# Patient Record
Sex: MALE | Race: WHITE | NOT HISPANIC OR LATINO | Employment: UNEMPLOYED | URBAN - METROPOLITAN AREA
[De-identification: names, ages, dates, MRNs, and addresses within clinical notes are randomized per-mention and may not be internally consistent; named-entity substitution may affect disease eponyms.]

---

## 2017-03-24 ENCOUNTER — HISTORICAL (OUTPATIENT)
Dept: CARDIOLOGY | Facility: CLINIC | Age: 57
End: 2017-03-24

## 2017-06-06 ENCOUNTER — HISTORICAL (OUTPATIENT)
Dept: ADMINISTRATIVE | Facility: HOSPITAL | Age: 57
End: 2017-06-06

## 2017-06-06 LAB
INR PPP: 1.81 (ref 0.9–1.2)
PROTHROMBIN TIME: 20.7 SECOND(S) (ref 11.9–14.4)

## 2017-07-25 ENCOUNTER — HISTORICAL (OUTPATIENT)
Dept: ADMINISTRATIVE | Facility: HOSPITAL | Age: 57
End: 2017-07-25

## 2017-07-25 LAB
INR PPP: 2.42 (ref 0.9–1.2)
PROTHROMBIN TIME: 25.9 SECOND(S) (ref 11.9–14.4)

## 2017-09-05 ENCOUNTER — HISTORICAL (OUTPATIENT)
Dept: INTERNAL MEDICINE | Facility: CLINIC | Age: 57
End: 2017-09-05

## 2017-09-05 LAB
INR PPP: 2.73 (ref 0.9–1.2)
PROTHROMBIN TIME: 28.5 SECOND(S) (ref 11.9–14.4)

## 2017-12-20 ENCOUNTER — HISTORICAL (OUTPATIENT)
Dept: INTERNAL MEDICINE | Facility: CLINIC | Age: 57
End: 2017-12-20

## 2017-12-20 LAB
INR PPP: 2.56 (ref 0.9–1.2)
PROTHROMBIN TIME: 27.1 SECOND(S) (ref 11.9–14.4)

## 2018-03-14 ENCOUNTER — HISTORICAL (OUTPATIENT)
Dept: INTERNAL MEDICINE | Facility: CLINIC | Age: 58
End: 2018-03-14

## 2018-03-14 LAB
INR PPP: 2.56 (ref 0.9–1.2)
PROTHROMBIN TIME: 26.2 SECOND(S) (ref 11.9–14.4)

## 2018-04-25 ENCOUNTER — HISTORICAL (OUTPATIENT)
Dept: INTERNAL MEDICINE | Facility: CLINIC | Age: 58
End: 2018-04-25

## 2018-04-25 LAB
INR PPP: 2.29 (ref 0.9–1.2)
PROTHROMBIN TIME: 24 SECOND(S) (ref 11.9–14.4)

## 2018-06-13 ENCOUNTER — HISTORICAL (OUTPATIENT)
Dept: RADIOLOGY | Facility: HOSPITAL | Age: 58
End: 2018-06-13

## 2018-08-20 ENCOUNTER — HISTORICAL (OUTPATIENT)
Dept: RADIOLOGY | Facility: HOSPITAL | Age: 58
End: 2018-08-20

## 2018-08-21 ENCOUNTER — HISTORICAL (OUTPATIENT)
Dept: ADMINISTRATIVE | Facility: HOSPITAL | Age: 58
End: 2018-08-21

## 2018-08-21 LAB
ABS NEUT (OLG): 5.67 X10(3)/MCL (ref 2.1–9.2)
APTT PPP: 41.8 SECOND(S) (ref 23.3–37)
BASOPHILS # BLD AUTO: 0.07 X10(3)/MCL
BASOPHILS NFR BLD AUTO: 1 %
BUN SERPL-MCNC: 12 MG/DL (ref 7–18)
CALCIUM SERPL-MCNC: 8.3 MG/DL (ref 8.5–10.1)
CHLORIDE SERPL-SCNC: 106 MMOL/L (ref 98–107)
CO2 SERPL-SCNC: 26 MMOL/L (ref 21–32)
CREAT SERPL-MCNC: 0.9 MG/DL (ref 0.6–1.3)
CREAT/UREA NIT SERPL: 13
EOSINOPHIL # BLD AUTO: 0.42 X10(3)/MCL
EOSINOPHIL NFR BLD AUTO: 5 %
ERYTHROCYTE [DISTWIDTH] IN BLOOD BY AUTOMATED COUNT: 12.8 % (ref 11.5–14.5)
GLUCOSE SERPL-MCNC: 118 MG/DL (ref 74–106)
HCT VFR BLD AUTO: 43 % (ref 40–51)
HGB BLD-MCNC: 14.3 GM/DL (ref 13.5–17.5)
IMM GRANULOCYTES # BLD AUTO: 0.03 10*3/UL
IMM GRANULOCYTES NFR BLD AUTO: 0 %
INR PPP: 2.76 (ref 0.9–1.2)
LYMPHOCYTES # BLD AUTO: 2.2 X10(3)/MCL
LYMPHOCYTES NFR BLD AUTO: 24 % (ref 13–40)
MCH RBC QN AUTO: 31.3 PG (ref 26–34)
MCHC RBC AUTO-ENTMCNC: 33.3 GM/DL (ref 31–37)
MCV RBC AUTO: 94.1 FL (ref 80–100)
MONOCYTES # BLD AUTO: 0.77 X10(3)/MCL
MONOCYTES NFR BLD AUTO: 8 % (ref 4–12)
NEUTROPHILS # BLD AUTO: 5.67 X10(3)/MCL
NEUTROPHILS NFR BLD AUTO: 62 X10(3)/MCL
PLATELET # BLD AUTO: 255 X10(3)/MCL (ref 130–400)
PMV BLD AUTO: 10.7 FL (ref 7.4–10.4)
POTASSIUM SERPL-SCNC: 3.7 MMOL/L (ref 3.5–5.1)
PROTHROMBIN TIME: 27.8 SECOND(S) (ref 11.9–14.4)
RBC # BLD AUTO: 4.57 X10(6)/MCL (ref 4.5–5.9)
SODIUM SERPL-SCNC: 141 MMOL/L (ref 136–145)
WBC # SPEC AUTO: 9.2 X10(3)/MCL (ref 4.5–11)

## 2018-08-29 ENCOUNTER — HISTORICAL (OUTPATIENT)
Dept: CARDIOLOGY | Facility: HOSPITAL | Age: 58
End: 2018-08-29

## 2018-08-29 ENCOUNTER — HISTORICAL (OUTPATIENT)
Dept: RADIOLOGY | Facility: HOSPITAL | Age: 58
End: 2018-08-29

## 2018-08-29 LAB
APTT PPP: 29 SECOND(S) (ref 23.3–37)
INR PPP: 1.09 (ref 0.9–1.2)
PROTHROMBIN TIME: 13.4 SECOND(S) (ref 11.9–14.4)

## 2018-09-12 ENCOUNTER — HISTORICAL (OUTPATIENT)
Dept: CARDIOLOGY | Facility: HOSPITAL | Age: 58
End: 2018-09-12

## 2018-09-12 LAB
APTT PPP: 30.7 SECOND(S) (ref 23.3–37)
INR PPP: 1.18 (ref 0.9–1.2)
PROTHROMBIN TIME: 14.2 SECOND(S) (ref 11.9–14.4)

## 2019-01-22 ENCOUNTER — HISTORICAL (OUTPATIENT)
Dept: ADMINISTRATIVE | Facility: HOSPITAL | Age: 59
End: 2019-01-22

## 2019-01-22 LAB
BUN SERPL-MCNC: 10 MG/DL (ref 7–18)
CALCIUM SERPL-MCNC: 8.7 MG/DL (ref 8.5–10.1)
CHLORIDE SERPL-SCNC: 107 MMOL/L (ref 98–107)
CO2 SERPL-SCNC: 29 MMOL/L (ref 21–32)
CREAT SERPL-MCNC: 0.9 MG/DL (ref 0.6–1.3)
CREAT/UREA NIT SERPL: 11
GLUCOSE SERPL-MCNC: 87 MG/DL (ref 74–106)
MAGNESIUM SERPL-MCNC: 2.1 MG/DL (ref 1.8–2.4)
POTASSIUM SERPL-SCNC: 4 MMOL/L (ref 3.5–5.1)
SODIUM SERPL-SCNC: 140 MMOL/L (ref 136–145)

## 2020-09-10 ENCOUNTER — HISTORICAL (OUTPATIENT)
Dept: CARDIOLOGY | Facility: CLINIC | Age: 60
End: 2020-09-10

## 2020-09-10 LAB
ALBUMIN SERPL-MCNC: 3.7 GM/DL (ref 3.4–5)
ALBUMIN/GLOB SERPL: 1.2 RATIO (ref 1.1–2)
ALP SERPL-CCNC: 72 UNIT/L (ref 45–117)
ALT SERPL-CCNC: 20 UNIT/L (ref 12–78)
AST SERPL-CCNC: 19 UNIT/L (ref 15–37)
BILIRUB SERPL-MCNC: 0.5 MG/DL (ref 0.2–1)
BILIRUBIN DIRECT+TOT PNL SERPL-MCNC: 0.1 MG/DL (ref 0–0.2)
BILIRUBIN DIRECT+TOT PNL SERPL-MCNC: 0.4 MG/DL
BUN SERPL-MCNC: 21 MG/DL (ref 7–18)
CALCIUM SERPL-MCNC: 8.5 MG/DL (ref 8.5–10.1)
CHLORIDE SERPL-SCNC: 109 MMOL/L (ref 98–107)
CHOLEST SERPL-MCNC: 152 MG/DL
CHOLEST/HDLC SERPL: 4 {RATIO} (ref 0–5)
CO2 SERPL-SCNC: 26 MMOL/L (ref 21–32)
CREAT SERPL-MCNC: 1.2 MG/DL (ref 0.6–1.3)
GLOBULIN SER-MCNC: 3 GM/ML (ref 2.3–3.5)
GLUCOSE SERPL-MCNC: 97 MG/DL (ref 74–106)
HDLC SERPL-MCNC: 38 MG/DL (ref 40–59)
LDLC SERPL CALC-MCNC: 90 MG/DL
POTASSIUM SERPL-SCNC: 4.4 MMOL/L (ref 3.5–5.1)
PROT SERPL-MCNC: 6.7 GM/DL (ref 6.4–8.2)
SODIUM SERPL-SCNC: 142 MMOL/L (ref 136–145)
T4 FREE SERPL-MCNC: 1.35 NG/DL (ref 0.76–1.46)
TRIGL SERPL-MCNC: 122 MG/DL
TSH SERPL-ACNC: 3.86 MIU/L (ref 0.36–3.74)
VLDLC SERPL CALC-MCNC: 24 MG/DL

## 2021-02-04 ENCOUNTER — HISTORICAL (OUTPATIENT)
Dept: CARDIOLOGY | Facility: HOSPITAL | Age: 61
End: 2021-02-04

## 2021-02-04 LAB
ABS NEUT (OLG): 3.88 X10(3)/MCL (ref 2.1–9.2)
APTT PPP: 34.8 SECOND(S) (ref 23.3–37)
BASOPHILS # BLD AUTO: 0.1 X10(3)/MCL (ref 0–0.2)
BASOPHILS NFR BLD AUTO: 1 %
BUN SERPL-MCNC: 23 MG/DL (ref 8.4–25.7)
CALCIUM SERPL-MCNC: 8.9 MG/DL (ref 8.4–10.2)
CHLORIDE SERPL-SCNC: 106 MMOL/L (ref 98–107)
CO2 SERPL-SCNC: 24 MMOL/L (ref 22–29)
CREAT SERPL-MCNC: 1.42 MG/DL (ref 0.73–1.18)
CREAT/UREA NIT SERPL: 16
EOSINOPHIL # BLD AUTO: 0.3 X10(3)/MCL (ref 0–0.9)
EOSINOPHIL NFR BLD AUTO: 4 %
ERYTHROCYTE [DISTWIDTH] IN BLOOD BY AUTOMATED COUNT: 16.5 % (ref 11.5–14.5)
GLUCOSE SERPL-MCNC: 103 MG/DL (ref 74–100)
HCT VFR BLD AUTO: 37.9 % (ref 40–51)
HGB BLD-MCNC: 11.3 GM/DL (ref 13.5–17.5)
IMM GRANULOCYTES # BLD AUTO: 0.03 10*3/UL
IMM GRANULOCYTES NFR BLD AUTO: 0 %
INR PPP: 1.97 (ref 0.9–1.2)
LYMPHOCYTES # BLD AUTO: 0.9 X10(3)/MCL (ref 0.6–4.6)
LYMPHOCYTES NFR BLD AUTO: 15 %
MCH RBC QN AUTO: 26 PG (ref 26–34)
MCHC RBC AUTO-ENTMCNC: 29.8 GM/DL (ref 31–37)
MCV RBC AUTO: 87.3 FL (ref 80–100)
MONOCYTES # BLD AUTO: 0.8 X10(3)/MCL (ref 0.1–1.3)
MONOCYTES NFR BLD AUTO: 13 %
NEUTROPHILS # BLD AUTO: 3.88 X10(3)/MCL (ref 2.1–9.2)
NEUTROPHILS NFR BLD AUTO: 66 %
PLATELET # BLD AUTO: 276 X10(3)/MCL (ref 130–400)
PMV BLD AUTO: 10.2 FL (ref 7.4–10.4)
POTASSIUM SERPL-SCNC: 4.3 MMOL/L (ref 3.5–5.1)
PROTHROMBIN TIME: 21.9 SECOND(S) (ref 11.9–14.4)
RBC # BLD AUTO: 4.34 X10(6)/MCL (ref 4.5–5.9)
SODIUM SERPL-SCNC: 138 MMOL/L (ref 136–145)
WBC # SPEC AUTO: 5.9 X10(3)/MCL (ref 4.5–11)

## 2021-02-08 ENCOUNTER — HISTORICAL (OUTPATIENT)
Dept: CARDIOLOGY | Facility: HOSPITAL | Age: 61
End: 2021-02-08

## 2021-02-08 LAB
APTT PPP: 31 SECOND(S) (ref 23.3–37)
INR PPP: 1.57 (ref 0.9–1.2)
PROTHROMBIN TIME: 18.3 SECOND(S) (ref 11.9–14.4)

## 2021-04-26 ENCOUNTER — HOSPITAL ENCOUNTER (OUTPATIENT)
Dept: MEDSURG UNIT | Facility: HOSPITAL | Age: 61
End: 2021-04-28
Attending: INTERNAL MEDICINE | Admitting: INTERNAL MEDICINE

## 2021-04-26 LAB
ABS NEUT (OLG): 5.49 X10(3)/MCL (ref 2.1–9.2)
ALBUMIN SERPL-MCNC: 3.8 GM/DL (ref 3.5–5)
ALBUMIN/GLOB SERPL: 1.5 RATIO (ref 1.1–2)
ALP SERPL-CCNC: 110 UNIT/L (ref 40–150)
ALT SERPL-CCNC: 46 UNIT/L (ref 0–55)
APTT PPP: 33.2 SECOND(S) (ref 23.3–37)
AST SERPL-CCNC: 29 UNIT/L (ref 5–34)
BASOPHILS # BLD AUTO: 0.1 X10(3)/MCL (ref 0–0.2)
BASOPHILS NFR BLD AUTO: 1 %
BILIRUB SERPL-MCNC: 1.2 MG/DL
BILIRUBIN DIRECT+TOT PNL SERPL-MCNC: 0.6 MG/DL (ref 0–0.5)
BILIRUBIN DIRECT+TOT PNL SERPL-MCNC: 0.6 MG/DL (ref 0–0.8)
BNP BLD-MCNC: 4914.3 PG/ML
BUN SERPL-MCNC: 28.9 MG/DL (ref 8.4–25.7)
CALCIUM SERPL-MCNC: 9.1 MG/DL (ref 8.4–10.2)
CHLORIDE SERPL-SCNC: 100 MMOL/L (ref 98–107)
CO2 SERPL-SCNC: 26 MMOL/L (ref 22–29)
CREAT SERPL-MCNC: 1.76 MG/DL (ref 0.73–1.18)
EOSINOPHIL # BLD AUTO: 0.2 X10(3)/MCL (ref 0–0.9)
EOSINOPHIL NFR BLD AUTO: 2 %
ERYTHROCYTE [DISTWIDTH] IN BLOOD BY AUTOMATED COUNT: 17.2 % (ref 11.5–14.5)
GLOBULIN SER-MCNC: 2.6 GM/DL (ref 2.4–3.5)
GLUCOSE SERPL-MCNC: 123 MG/DL (ref 74–100)
HCT VFR BLD AUTO: 37.8 % (ref 40–51)
HGB BLD-MCNC: 11.3 GM/DL (ref 13.5–17.5)
IMM GRANULOCYTES # BLD AUTO: 0.02 10*3/UL
IMM GRANULOCYTES NFR BLD AUTO: 0 %
INR PPP: 2.04 (ref 0.9–1.2)
LYMPHOCYTES # BLD AUTO: 0.9 X10(3)/MCL (ref 0.6–4.6)
LYMPHOCYTES NFR BLD AUTO: 12 %
MCH RBC QN AUTO: 25.3 PG (ref 26–34)
MCHC RBC AUTO-ENTMCNC: 29.9 GM/DL (ref 31–37)
MCV RBC AUTO: 84.6 FL (ref 80–100)
MONOCYTES # BLD AUTO: 0.7 X10(3)/MCL (ref 0.1–1.3)
MONOCYTES NFR BLD AUTO: 10 %
NEUTROPHILS # BLD AUTO: 5.49 X10(3)/MCL (ref 2.1–9.2)
NEUTROPHILS NFR BLD AUTO: 75 %
PLATELET # BLD AUTO: 258 X10(3)/MCL (ref 130–400)
PMV BLD AUTO: 10.4 FL (ref 7.4–10.4)
POTASSIUM SERPL-SCNC: 5.3 MMOL/L (ref 3.5–5.1)
PROT SERPL-MCNC: 6.4 GM/DL (ref 6.4–8.3)
PROTHROMBIN TIME: 22.6 SECOND(S) (ref 11.9–14.4)
RBC # BLD AUTO: 4.47 X10(6)/MCL (ref 4.5–5.9)
SARS-COV-2 AG RESP QL IA.RAPID: NEGATIVE
SODIUM SERPL-SCNC: 133 MMOL/L (ref 136–145)
TROPONIN I SERPL-MCNC: 0.01 NG/ML (ref 0–0.04)
WBC # SPEC AUTO: 7.4 X10(3)/MCL (ref 4.5–11)

## 2021-04-27 LAB
ABS NEUT (OLG): 5.7 X10(3)/MCL (ref 2.1–9.2)
ALBUMIN SERPL-MCNC: 3.5 GM/DL (ref 3.5–5)
ALBUMIN/GLOB SERPL: 1.5 RATIO (ref 1.1–2)
ALP SERPL-CCNC: 90 UNIT/L (ref 40–150)
ALT SERPL-CCNC: 60 UNIT/L (ref 0–55)
AST SERPL-CCNC: 45 UNIT/L (ref 5–34)
BASOPHILS # BLD AUTO: 0.1 X10(3)/MCL (ref 0–0.2)
BASOPHILS NFR BLD AUTO: 1 %
BILIRUB SERPL-MCNC: 1.7 MG/DL
BILIRUBIN DIRECT+TOT PNL SERPL-MCNC: 0.8 MG/DL (ref 0–0.5)
BILIRUBIN DIRECT+TOT PNL SERPL-MCNC: 0.9 MG/DL (ref 0–0.8)
BUN SERPL-MCNC: 32.4 MG/DL (ref 8.4–25.7)
CALCIUM SERPL-MCNC: 9 MG/DL (ref 8.4–10.2)
CHLORIDE SERPL-SCNC: 99 MMOL/L (ref 98–107)
CO2 SERPL-SCNC: 25 MMOL/L (ref 22–29)
CREAT SERPL-MCNC: 1.71 MG/DL (ref 0.73–1.18)
EOSINOPHIL # BLD AUTO: 0.1 X10(3)/MCL (ref 0–0.9)
EOSINOPHIL NFR BLD AUTO: 2 %
ERYTHROCYTE [DISTWIDTH] IN BLOOD BY AUTOMATED COUNT: 16.9 % (ref 11.5–14.5)
GLOBULIN SER-MCNC: 2.4 GM/DL (ref 2.4–3.5)
GLUCOSE SERPL-MCNC: 108 MG/DL (ref 74–100)
HCT VFR BLD AUTO: 34 % (ref 40–51)
HGB BLD-MCNC: 10.3 GM/DL (ref 13.5–17.5)
IMM GRANULOCYTES # BLD AUTO: 0.03 10*3/UL
IMM GRANULOCYTES NFR BLD AUTO: 0 %
INR PPP: 1.96 (ref 0.9–1.2)
LYMPHOCYTES # BLD AUTO: 0.8 X10(3)/MCL (ref 0.6–4.6)
LYMPHOCYTES NFR BLD AUTO: 11 %
MAGNESIUM SERPL-MCNC: 2 MG/DL (ref 1.6–2.6)
MCH RBC QN AUTO: 25.1 PG (ref 26–34)
MCHC RBC AUTO-ENTMCNC: 30.3 GM/DL (ref 31–37)
MCV RBC AUTO: 82.9 FL (ref 80–100)
MONOCYTES # BLD AUTO: 0.7 X10(3)/MCL (ref 0.1–1.3)
MONOCYTES NFR BLD AUTO: 10 %
NEUTROPHILS # BLD AUTO: 5.7 X10(3)/MCL (ref 2.1–9.2)
NEUTROPHILS NFR BLD AUTO: 77 %
PHOSPHATE SERPL-MCNC: 4 MG/DL (ref 2.3–4.7)
PLATELET # BLD AUTO: 235 X10(3)/MCL (ref 130–400)
PMV BLD AUTO: 10.9 FL (ref 7.4–10.4)
POTASSIUM SERPL-SCNC: 4.9 MMOL/L (ref 3.5–5.1)
PROT SERPL-MCNC: 5.9 GM/DL (ref 6.4–8.3)
PROTHROMBIN TIME: 21.8 SECOND(S) (ref 11.9–14.4)
RBC # BLD AUTO: 4.1 X10(6)/MCL (ref 4.5–5.9)
SODIUM SERPL-SCNC: 133 MMOL/L (ref 136–145)
WBC # SPEC AUTO: 7.4 X10(3)/MCL (ref 4.5–11)

## 2021-04-28 LAB
ABS NEUT (OLG): 5.68 X10(3)/MCL (ref 2.1–9.2)
ALBUMIN SERPL-MCNC: 3.7 GM/DL (ref 3.5–5)
ALBUMIN/GLOB SERPL: 1.4 RATIO (ref 1.1–2)
ALP SERPL-CCNC: 94 UNIT/L (ref 40–150)
ALT SERPL-CCNC: 91 UNIT/L (ref 0–55)
AST SERPL-CCNC: 72 UNIT/L (ref 5–34)
BASOPHILS # BLD AUTO: 0 X10(3)/MCL (ref 0–0.2)
BASOPHILS NFR BLD AUTO: 0 %
BILIRUB SERPL-MCNC: 1.7 MG/DL
BILIRUBIN DIRECT+TOT PNL SERPL-MCNC: 0.8 MG/DL (ref 0–0.5)
BILIRUBIN DIRECT+TOT PNL SERPL-MCNC: 0.9 MG/DL (ref 0–0.8)
BNP BLD-MCNC: 6473.5 PG/ML
BUN SERPL-MCNC: 38 MG/DL (ref 8.4–25.7)
CALCIUM SERPL-MCNC: 9.1 MG/DL (ref 8.4–10.2)
CHLORIDE SERPL-SCNC: 96 MMOL/L (ref 98–107)
CO2 SERPL-SCNC: 23 MMOL/L (ref 22–29)
CREAT SERPL-MCNC: 1.79 MG/DL (ref 0.73–1.18)
EOSINOPHIL # BLD AUTO: 0.1 X10(3)/MCL (ref 0–0.9)
EOSINOPHIL NFR BLD AUTO: 1 %
ERYTHROCYTE [DISTWIDTH] IN BLOOD BY AUTOMATED COUNT: 16.9 % (ref 11.5–14.5)
GLOBULIN SER-MCNC: 2.6 GM/DL (ref 2.4–3.5)
GLUCOSE SERPL-MCNC: 104 MG/DL (ref 74–100)
HCT VFR BLD AUTO: 35.3 % (ref 40–51)
HGB BLD-MCNC: 10.8 GM/DL (ref 13.5–17.5)
IMM GRANULOCYTES # BLD AUTO: 0.03 10*3/UL
IMM GRANULOCYTES NFR BLD AUTO: 0 %
INR PPP: 2.01 (ref 0.9–1.2)
LYMPHOCYTES # BLD AUTO: 0.7 X10(3)/MCL (ref 0.6–4.6)
LYMPHOCYTES NFR BLD AUTO: 10 %
MAGNESIUM SERPL-MCNC: 2 MG/DL (ref 1.6–2.6)
MCH RBC QN AUTO: 25.2 PG (ref 26–34)
MCHC RBC AUTO-ENTMCNC: 30.6 GM/DL (ref 31–37)
MCV RBC AUTO: 82.3 FL (ref 80–100)
MONOCYTES # BLD AUTO: 0.9 X10(3)/MCL (ref 0.1–1.3)
MONOCYTES NFR BLD AUTO: 12 %
NEUTROPHILS # BLD AUTO: 5.68 X10(3)/MCL (ref 2.1–9.2)
NEUTROPHILS NFR BLD AUTO: 76 %
PHOSPHATE SERPL-MCNC: 4.3 MG/DL (ref 2.3–4.7)
PLATELET # BLD AUTO: 261 X10(3)/MCL (ref 130–400)
PMV BLD AUTO: 10.8 FL (ref 7.4–10.4)
POTASSIUM SERPL-SCNC: 5 MMOL/L (ref 3.5–5.1)
PROT SERPL-MCNC: 6.3 GM/DL (ref 6.4–8.3)
PROTHROMBIN TIME: 22.3 SECOND(S) (ref 11.9–14.4)
RBC # BLD AUTO: 4.29 X10(6)/MCL (ref 4.5–5.9)
SODIUM SERPL-SCNC: 129 MMOL/L (ref 136–145)
WBC # SPEC AUTO: 7.5 X10(3)/MCL (ref 4.5–11)

## 2021-05-17 ENCOUNTER — HISTORICAL (OUTPATIENT)
Dept: ADMINISTRATIVE | Facility: HOSPITAL | Age: 61
End: 2021-05-17

## 2021-05-17 LAB
ABS NEUT (OLG): 5.72 X10(3)/MCL (ref 2.1–9.2)
ALBUMIN SERPL-MCNC: 3.7 GM/DL (ref 3.5–5)
ALBUMIN/GLOB SERPL: 1.1 RATIO (ref 1.1–2)
ALP SERPL-CCNC: 162 UNIT/L (ref 40–150)
ALT SERPL-CCNC: 210 UNIT/L (ref 0–55)
APPEARANCE, UA: CLEAR
AST SERPL-CCNC: 41 UNIT/L (ref 5–34)
BACTERIA #/AREA URNS AUTO: ABNORMAL /HPF
BASOPHILS # BLD AUTO: 0.1 X10(3)/MCL (ref 0–0.2)
BASOPHILS NFR BLD AUTO: 1 %
BILIRUB SERPL-MCNC: 1.9 MG/DL
BILIRUB UR QL STRIP: NEGATIVE
BILIRUBIN DIRECT+TOT PNL SERPL-MCNC: 0.7 MG/DL (ref 0–0.8)
BILIRUBIN DIRECT+TOT PNL SERPL-MCNC: 1.2 MG/DL (ref 0–0.5)
BUN SERPL-MCNC: 14.3 MG/DL (ref 8.4–25.7)
CALCIUM SERPL-MCNC: 9.5 MG/DL (ref 8.4–10.2)
CHLORIDE SERPL-SCNC: 92 MMOL/L (ref 98–107)
CHOLEST SERPL-MCNC: 136 MG/DL
CHOLEST/HDLC SERPL: 3 {RATIO} (ref 0–5)
CO2 SERPL-SCNC: 31 MMOL/L (ref 22–29)
COLOR UR: ABNORMAL
CREAT SERPL-MCNC: 1.24 MG/DL (ref 0.73–1.18)
EOSINOPHIL # BLD AUTO: 0.2 X10(3)/MCL (ref 0–0.9)
EOSINOPHIL NFR BLD AUTO: 2 %
ERYTHROCYTE [DISTWIDTH] IN BLOOD BY AUTOMATED COUNT: 19.9 % (ref 11.5–14.5)
EST. AVERAGE GLUCOSE BLD GHB EST-MCNC: 125.5 MG/DL
GLOBULIN SER-MCNC: 3.4 GM/DL (ref 2.4–3.5)
GLUCOSE (UA): NEGATIVE
GLUCOSE SERPL-MCNC: 93 MG/DL (ref 74–100)
HBA1C MFR BLD: 6 %
HCT VFR BLD AUTO: 41.4 % (ref 40–51)
HDLC SERPL-MCNC: 46 MG/DL (ref 35–60)
HGB BLD-MCNC: 12.3 GM/DL (ref 13.5–17.5)
HGB UR QL STRIP: NEGATIVE
HYALINE CASTS #/AREA URNS LPF: ABNORMAL /LPF
IMM GRANULOCYTES # BLD AUTO: 0.19 10*3/UL
IMM GRANULOCYTES NFR BLD AUTO: 2 %
KETONES UR QL STRIP: NEGATIVE
LDLC SERPL CALC-MCNC: 72 MG/DL (ref 50–140)
LEUKOCYTE ESTERASE UR QL STRIP: NEGATIVE
LYMPHOCYTES # BLD AUTO: 0.6 X10(3)/MCL (ref 0.6–4.6)
LYMPHOCYTES NFR BLD AUTO: 8 %
MCH RBC QN AUTO: 25.1 PG (ref 26–34)
MCHC RBC AUTO-ENTMCNC: 29.7 GM/DL (ref 31–37)
MCV RBC AUTO: 84.3 FL (ref 80–100)
MONOCYTES # BLD AUTO: 1.1 X10(3)/MCL (ref 0.1–1.3)
MONOCYTES NFR BLD AUTO: 14 %
NEUTROPHILS # BLD AUTO: 5.72 X10(3)/MCL (ref 2.1–9.2)
NEUTROPHILS NFR BLD AUTO: 73 %
NITRITE UR QL STRIP: NEGATIVE
PH UR STRIP: 7.5 [PH] (ref 4.5–8)
PLATELET # BLD AUTO: 548 X10(3)/MCL (ref 130–400)
PMV BLD AUTO: 9.6 FL (ref 7.4–10.4)
POTASSIUM SERPL-SCNC: 4.3 MMOL/L (ref 3.5–5.1)
PROT SERPL-MCNC: 7.1 GM/DL (ref 6.4–8.3)
PROT UR QL STRIP: NEGATIVE
PSA SERPL-MCNC: 5.88 NG/ML
RBC # BLD AUTO: 4.91 X10(6)/MCL (ref 4.5–5.9)
RBC #/AREA URNS AUTO: ABNORMAL /HPF
SODIUM SERPL-SCNC: 132 MMOL/L (ref 136–145)
SP GR UR STRIP: 1 (ref 1–1.03)
SQUAMOUS #/AREA URNS LPF: ABNORMAL /LPF
T4 FREE SERPL-MCNC: 1.09 NG/DL (ref 0.7–1.48)
TRIGL SERPL-MCNC: 89 MG/DL (ref 34–140)
TSH SERPL-ACNC: 8.57 UIU/ML (ref 0.35–4.94)
UROBILINOGEN UR STRIP-ACNC: NORMAL
VLDLC SERPL CALC-MCNC: 18 MG/DL
WBC # SPEC AUTO: 7.9 X10(3)/MCL (ref 4.5–11)
WBC #/AREA URNS AUTO: ABNORMAL /HPF

## 2021-11-06 ENCOUNTER — HOSPITAL ENCOUNTER (OUTPATIENT)
Dept: MEDSURG UNIT | Facility: HOSPITAL | Age: 61
End: 2021-11-09
Attending: STUDENT IN AN ORGANIZED HEALTH CARE EDUCATION/TRAINING PROGRAM | Admitting: FAMILY MEDICINE

## 2021-11-06 LAB
ABS NEUT (OLG): 6.52 X10(3)/MCL (ref 2.1–9.2)
ALBUMIN SERPL-MCNC: 3.9 GM/DL (ref 3.4–4.8)
ALBUMIN/GLOB SERPL: 1.1 RATIO (ref 1.1–2)
ALP SERPL-CCNC: 97 UNIT/L (ref 40–150)
ALT SERPL-CCNC: 10 UNIT/L (ref 0–55)
APTT PPP: 43.3 SECOND(S) (ref 23.3–37)
AST SERPL-CCNC: 17 UNIT/L (ref 5–34)
BASOPHILS # BLD AUTO: 0.1 X10(3)/MCL (ref 0–0.2)
BASOPHILS NFR BLD AUTO: 1 %
BILIRUB SERPL-MCNC: 0.6 MG/DL
BILIRUBIN DIRECT+TOT PNL SERPL-MCNC: 0.3 MG/DL (ref 0–0.5)
BILIRUBIN DIRECT+TOT PNL SERPL-MCNC: 0.3 MG/DL (ref 0–0.8)
BUN SERPL-MCNC: 10.9 MG/DL (ref 8.4–25.7)
BUN SERPL-MCNC: 11 MG/DL (ref 8.4–25.7)
CALCIUM SERPL-MCNC: 10 MG/DL (ref 8.7–10.5)
CALCIUM SERPL-MCNC: 9.8 MG/DL (ref 8.7–10.5)
CHLORIDE SERPL-SCNC: 100 MMOL/L (ref 98–107)
CHLORIDE SERPL-SCNC: 100 MMOL/L (ref 98–107)
CO2 SERPL-SCNC: 25 MMOL/L (ref 23–31)
CO2 SERPL-SCNC: 27 MMOL/L (ref 23–31)
CREAT SERPL-MCNC: 0.98 MG/DL (ref 0.73–1.18)
CREAT SERPL-MCNC: 1.03 MG/DL (ref 0.73–1.18)
CREAT/UREA NIT SERPL: 11
EOSINOPHIL # BLD AUTO: 0.2 X10(3)/MCL (ref 0–0.9)
EOSINOPHIL NFR BLD AUTO: 3 %
ERYTHROCYTE [DISTWIDTH] IN BLOOD BY AUTOMATED COUNT: 14.1 % (ref 11.5–14.5)
EST. AVERAGE GLUCOSE BLD GHB EST-MCNC: 105.4 MG/DL
GLOBULIN SER-MCNC: 3.4 GM/DL (ref 2.4–3.5)
GLUCOSE SERPL-MCNC: 107 MG/DL (ref 82–115)
GLUCOSE SERPL-MCNC: 110 MG/DL (ref 82–115)
HBA1C MFR BLD: 5.3 %
HCT VFR BLD AUTO: 43.3 % (ref 40–51)
HGB BLD-MCNC: 14.2 GM/DL (ref 13.5–17.5)
IMM GRANULOCYTES # BLD AUTO: 0.03 10*3/UL
IMM GRANULOCYTES NFR BLD AUTO: 0 %
INR PPP: 2.49 (ref 0.9–1.2)
LYMPHOCYTES # BLD AUTO: 0.8 X10(3)/MCL (ref 0.6–4.6)
LYMPHOCYTES NFR BLD AUTO: 10 %
MAGNESIUM SERPL-MCNC: 1.7 MG/DL (ref 1.6–2.6)
MCH RBC QN AUTO: 28.5 PG (ref 26–34)
MCHC RBC AUTO-ENTMCNC: 32.8 GM/DL (ref 31–37)
MCV RBC AUTO: 86.8 FL (ref 80–100)
MONOCYTES # BLD AUTO: 0.8 X10(3)/MCL (ref 0.1–1.3)
MONOCYTES NFR BLD AUTO: 10 %
NEUTROPHILS # BLD AUTO: 6.52 X10(3)/MCL (ref 2.1–9.2)
NEUTROPHILS NFR BLD AUTO: 76 %
NRBC BLD AUTO-RTO: 0 % (ref 0–0.2)
PHOSPHATE SERPL-MCNC: 2.9 MG/DL (ref 2.3–4.7)
PLATELET # BLD AUTO: 330 X10(3)/MCL (ref 130–400)
PMV BLD AUTO: 9.5 FL (ref 7.4–10.4)
POTASSIUM SERPL-SCNC: 3.6 MMOL/L (ref 3.5–5.1)
POTASSIUM SERPL-SCNC: 3.6 MMOL/L (ref 3.5–5.1)
PROT SERPL-MCNC: 7.3 GM/DL (ref 5.8–7.6)
PROTHROMBIN TIME: 26.7 SECOND(S) (ref 11.9–14.4)
RBC # BLD AUTO: 4.99 X10(6)/MCL (ref 4.5–5.9)
SARS-COV-2 AG RESP QL IA.RAPID: NEGATIVE
SODIUM SERPL-SCNC: 138 MMOL/L (ref 136–145)
SODIUM SERPL-SCNC: 139 MMOL/L (ref 136–145)
T4 FREE SERPL-MCNC: 1.63 NG/DL (ref 0.7–1.48)
TROPONIN I SERPL-MCNC: 0.02 NG/ML (ref 0–0.04)
TSH SERPL-ACNC: <0.0083 UIU/ML (ref 0.35–4.94)
WBC # SPEC AUTO: 8.5 X10(3)/MCL (ref 4.5–11)

## 2021-11-07 LAB
ABS NEUT (OLG): 5.47 X10(3)/MCL (ref 2.1–9.2)
ALBUMIN SERPL-MCNC: 3.4 GM/DL (ref 3.4–4.8)
ALBUMIN/GLOB SERPL: 1.1 RATIO (ref 1.1–2)
ALP SERPL-CCNC: 85 UNIT/L (ref 40–150)
ALT SERPL-CCNC: 10 UNIT/L (ref 0–55)
AST SERPL-CCNC: 15 UNIT/L (ref 5–34)
BASOPHILS # BLD AUTO: 0.1 X10(3)/MCL (ref 0–0.2)
BASOPHILS NFR BLD AUTO: 1 %
BILIRUB SERPL-MCNC: 0.9 MG/DL
BILIRUBIN DIRECT+TOT PNL SERPL-MCNC: 0.3 MG/DL (ref 0–0.5)
BILIRUBIN DIRECT+TOT PNL SERPL-MCNC: 0.6 MG/DL (ref 0–0.8)
BUN SERPL-MCNC: 12.4 MG/DL (ref 8.4–25.7)
BUN SERPL-MCNC: 14.1 MG/DL (ref 8.4–25.7)
CALCIUM SERPL-MCNC: 9.1 MG/DL (ref 8.7–10.5)
CALCIUM SERPL-MCNC: 9.6 MG/DL (ref 8.7–10.5)
CHLORIDE SERPL-SCNC: 102 MMOL/L (ref 98–107)
CHLORIDE SERPL-SCNC: 97 MMOL/L (ref 98–107)
CHOLEST SERPL-MCNC: 154 MG/DL
CHOLEST/HDLC SERPL: 6 {RATIO} (ref 0–5)
CO2 SERPL-SCNC: 23 MMOL/L (ref 23–31)
CO2 SERPL-SCNC: 29 MMOL/L (ref 23–31)
CREAT SERPL-MCNC: 1.03 MG/DL (ref 0.73–1.18)
CREAT SERPL-MCNC: 1.05 MG/DL (ref 0.73–1.18)
CREAT/UREA NIT SERPL: 14
EOSINOPHIL # BLD AUTO: 0.3 X10(3)/MCL (ref 0–0.9)
EOSINOPHIL NFR BLD AUTO: 3 %
ERYTHROCYTE [DISTWIDTH] IN BLOOD BY AUTOMATED COUNT: 13.9 % (ref 11.5–14.5)
GLOBULIN SER-MCNC: 3 GM/DL (ref 2.4–3.5)
GLUCOSE SERPL-MCNC: 131 MG/DL (ref 82–115)
GLUCOSE SERPL-MCNC: 92 MG/DL (ref 82–115)
HCT VFR BLD AUTO: 41.7 % (ref 40–51)
HDLC SERPL-MCNC: 28 MG/DL (ref 35–60)
HGB BLD-MCNC: 13.3 GM/DL (ref 13.5–17.5)
IMM GRANULOCYTES # BLD AUTO: 0.03 10*3/UL
IMM GRANULOCYTES NFR BLD AUTO: 0 %
LDLC SERPL CALC-MCNC: 94 MG/DL (ref 50–140)
LYMPHOCYTES # BLD AUTO: 1.5 X10(3)/MCL (ref 0.6–4.6)
LYMPHOCYTES NFR BLD AUTO: 18 %
MAGNESIUM SERPL-MCNC: 2.3 MG/DL (ref 1.6–2.6)
MCH RBC QN AUTO: 28.1 PG (ref 26–34)
MCHC RBC AUTO-ENTMCNC: 31.9 GM/DL (ref 31–37)
MCV RBC AUTO: 88 FL (ref 80–100)
MONOCYTES # BLD AUTO: 0.9 X10(3)/MCL (ref 0.1–1.3)
MONOCYTES NFR BLD AUTO: 11 %
NEUTROPHILS # BLD AUTO: 5.47 X10(3)/MCL (ref 2.1–9.2)
NEUTROPHILS NFR BLD AUTO: 67 %
NRBC BLD AUTO-RTO: 0 % (ref 0–0.2)
PHOSPHATE SERPL-MCNC: 2.8 MG/DL (ref 2.3–4.7)
PLATELET # BLD AUTO: 298 X10(3)/MCL (ref 130–400)
PMV BLD AUTO: 9.8 FL (ref 7.4–10.4)
POTASSIUM SERPL-SCNC: 3 MMOL/L (ref 3.5–5.1)
POTASSIUM SERPL-SCNC: 4 MMOL/L (ref 3.5–5.1)
PROT SERPL-MCNC: 6.4 GM/DL (ref 5.8–7.6)
RBC # BLD AUTO: 4.74 X10(6)/MCL (ref 4.5–5.9)
SODIUM SERPL-SCNC: 133 MMOL/L (ref 136–145)
SODIUM SERPL-SCNC: 136 MMOL/L (ref 136–145)
TRIGL SERPL-MCNC: 161 MG/DL (ref 34–140)
TROPONIN I SERPL-MCNC: 0.01 NG/ML (ref 0–0.04)
TROPONIN I SERPL-MCNC: 0.03 NG/ML (ref 0–0.04)
VLDLC SERPL CALC-MCNC: 32 MG/DL
WBC # SPEC AUTO: 8.2 X10(3)/MCL (ref 4.5–11)

## 2021-11-08 LAB
ABS NEUT (OLG): 5.04 X10(3)/MCL (ref 2.1–9.2)
ALBUMIN SERPL-MCNC: 3.1 GM/DL (ref 3.4–4.8)
ALBUMIN/GLOB SERPL: 1.1 RATIO (ref 1.1–2)
ALP SERPL-CCNC: 72 UNIT/L (ref 40–150)
ALT SERPL-CCNC: 7 UNIT/L (ref 0–55)
AST SERPL-CCNC: 13 UNIT/L (ref 5–34)
BASOPHILS # BLD AUTO: 0.1 X10(3)/MCL (ref 0–0.2)
BASOPHILS NFR BLD AUTO: 1 %
BILIRUB SERPL-MCNC: 0.5 MG/DL
BILIRUBIN DIRECT+TOT PNL SERPL-MCNC: 0.2 MG/DL (ref 0–0.5)
BILIRUBIN DIRECT+TOT PNL SERPL-MCNC: 0.3 MG/DL (ref 0–0.8)
BUN SERPL-MCNC: 14.9 MG/DL (ref 8.4–25.7)
CALCIUM SERPL-MCNC: 9 MG/DL (ref 8.7–10.5)
CHLORIDE SERPL-SCNC: 102 MMOL/L (ref 98–107)
CO2 SERPL-SCNC: 28 MMOL/L (ref 23–31)
CREAT SERPL-MCNC: 0.86 MG/DL (ref 0.73–1.18)
EOSINOPHIL # BLD AUTO: 0.3 X10(3)/MCL (ref 0–0.9)
EOSINOPHIL NFR BLD AUTO: 4 %
ERYTHROCYTE [DISTWIDTH] IN BLOOD BY AUTOMATED COUNT: 14 % (ref 11.5–14.5)
GLOBULIN SER-MCNC: 2.7 GM/DL (ref 2.4–3.5)
GLUCOSE SERPL-MCNC: 100 MG/DL (ref 82–115)
HCT VFR BLD AUTO: 37.9 % (ref 40–51)
HGB BLD-MCNC: 12.1 GM/DL (ref 13.5–17.5)
IMM GRANULOCYTES # BLD AUTO: 0.02 10*3/UL
IMM GRANULOCYTES NFR BLD AUTO: 0 %
LYMPHOCYTES # BLD AUTO: 1.4 X10(3)/MCL (ref 0.6–4.6)
LYMPHOCYTES NFR BLD AUTO: 18 %
MAGNESIUM SERPL-MCNC: 2.4 MG/DL (ref 1.6–2.6)
MCH RBC QN AUTO: 28.5 PG (ref 26–34)
MCHC RBC AUTO-ENTMCNC: 31.9 GM/DL (ref 31–37)
MCV RBC AUTO: 89.2 FL (ref 80–100)
MONOCYTES # BLD AUTO: 0.8 X10(3)/MCL (ref 0.1–1.3)
MONOCYTES NFR BLD AUTO: 10 %
NEUTROPHILS # BLD AUTO: 5.04 X10(3)/MCL (ref 2.1–9.2)
NEUTROPHILS NFR BLD AUTO: 67 %
NRBC BLD AUTO-RTO: 0 % (ref 0–0.2)
PHOSPHATE SERPL-MCNC: 3.2 MG/DL (ref 2.3–4.7)
PLATELET # BLD AUTO: 254 X10(3)/MCL (ref 130–400)
PMV BLD AUTO: 9.7 FL (ref 7.4–10.4)
POTASSIUM SERPL-SCNC: 3.5 MMOL/L (ref 3.5–5.1)
PROT SERPL-MCNC: 5.8 GM/DL (ref 5.8–7.6)
RBC # BLD AUTO: 4.25 X10(6)/MCL (ref 4.5–5.9)
SODIUM SERPL-SCNC: 136 MMOL/L (ref 136–145)
WBC # SPEC AUTO: 7.6 X10(3)/MCL (ref 4.5–11)

## 2021-11-09 LAB
ABS NEUT (OLG): 5.28 X10(3)/MCL (ref 2.1–9.2)
ALBUMIN SERPL-MCNC: 3.5 GM/DL (ref 3.4–4.8)
ALBUMIN/GLOB SERPL: 1.1 RATIO (ref 1.1–2)
ALP SERPL-CCNC: 81 UNIT/L (ref 40–150)
ALT SERPL-CCNC: 10 UNIT/L (ref 0–55)
AST SERPL-CCNC: 14 UNIT/L (ref 5–34)
BASOPHILS # BLD AUTO: 0.1 X10(3)/MCL (ref 0–0.2)
BASOPHILS NFR BLD AUTO: 1 %
BILIRUB SERPL-MCNC: 0.5 MG/DL
BILIRUBIN DIRECT+TOT PNL SERPL-MCNC: 0.2 MG/DL (ref 0–0.5)
BILIRUBIN DIRECT+TOT PNL SERPL-MCNC: 0.3 MG/DL (ref 0–0.8)
BUN SERPL-MCNC: 12.6 MG/DL (ref 8.4–25.7)
CALCIUM SERPL-MCNC: 9.6 MG/DL (ref 8.7–10.5)
CHLORIDE SERPL-SCNC: 99 MMOL/L (ref 98–107)
CO2 SERPL-SCNC: 28 MMOL/L (ref 23–31)
CREAT SERPL-MCNC: 1.2 MG/DL (ref 0.73–1.18)
EOSINOPHIL # BLD AUTO: 0.3 X10(3)/MCL (ref 0–0.9)
EOSINOPHIL NFR BLD AUTO: 4 %
ERYTHROCYTE [DISTWIDTH] IN BLOOD BY AUTOMATED COUNT: 13.8 % (ref 11.5–14.5)
GLOBULIN SER-MCNC: 3.1 GM/DL (ref 2.4–3.5)
GLUCOSE SERPL-MCNC: 98 MG/DL (ref 82–115)
HCT VFR BLD AUTO: 40.3 % (ref 40–51)
HGB BLD-MCNC: 13 GM/DL (ref 13.5–17.5)
IMM GRANULOCYTES # BLD AUTO: 0.02 10*3/UL
IMM GRANULOCYTES NFR BLD AUTO: 0 %
LYMPHOCYTES # BLD AUTO: 1.4 X10(3)/MCL (ref 0.6–4.6)
LYMPHOCYTES NFR BLD AUTO: 18 %
MAGNESIUM SERPL-MCNC: 2.1 MG/DL (ref 1.6–2.6)
MCH RBC QN AUTO: 28.5 PG (ref 26–34)
MCHC RBC AUTO-ENTMCNC: 32.3 GM/DL (ref 31–37)
MCV RBC AUTO: 88.4 FL (ref 80–100)
MONOCYTES # BLD AUTO: 0.8 X10(3)/MCL (ref 0.1–1.3)
MONOCYTES NFR BLD AUTO: 10 %
NEUTROPHILS # BLD AUTO: 5.28 X10(3)/MCL (ref 2.1–9.2)
NEUTROPHILS NFR BLD AUTO: 67 %
NRBC BLD AUTO-RTO: 0 % (ref 0–0.2)
PHOSPHATE SERPL-MCNC: 3.4 MG/DL (ref 2.3–4.7)
PLATELET # BLD AUTO: 273 X10(3)/MCL (ref 130–400)
PMV BLD AUTO: 9.7 FL (ref 7.4–10.4)
POTASSIUM SERPL-SCNC: 3.7 MMOL/L (ref 3.5–5.1)
PROT SERPL-MCNC: 6.6 GM/DL (ref 5.8–7.6)
RBC # BLD AUTO: 4.56 X10(6)/MCL (ref 4.5–5.9)
SODIUM SERPL-SCNC: 134 MMOL/L (ref 136–145)
TROPONIN I SERPL-MCNC: 0.02 NG/ML (ref 0–0.04)
WBC # SPEC AUTO: 7.9 X10(3)/MCL (ref 4.5–11)

## 2021-11-23 ENCOUNTER — HISTORICAL (OUTPATIENT)
Dept: ADMINISTRATIVE | Facility: HOSPITAL | Age: 61
End: 2021-11-23

## 2021-11-23 LAB
BUN SERPL-MCNC: 11.9 MG/DL (ref 8.4–25.7)
CALCIUM SERPL-MCNC: 9.8 MG/DL (ref 8.7–10.5)
CHLORIDE SERPL-SCNC: 101 MMOL/L (ref 98–107)
CO2 SERPL-SCNC: 27 MMOL/L (ref 23–31)
CREAT SERPL-MCNC: 1.05 MG/DL (ref 0.73–1.18)
CREAT/UREA NIT SERPL: 11
GLUCOSE SERPL-MCNC: 83 MG/DL (ref 82–115)
MAGNESIUM SERPL-MCNC: 1.8 MG/DL (ref 1.6–2.6)
POTASSIUM SERPL-SCNC: 3.8 MMOL/L (ref 3.5–5.1)
SODIUM SERPL-SCNC: 137 MMOL/L (ref 136–145)

## 2021-12-16 ENCOUNTER — HOSPITAL ENCOUNTER (OUTPATIENT)
Dept: INTENSIVE CARE | Facility: HOSPITAL | Age: 61
End: 2021-12-17
Attending: INTERNAL MEDICINE | Admitting: INTERNAL MEDICINE

## 2021-12-16 LAB
ABS NEUT (OLG): 5.77 X10(3)/MCL (ref 2.1–9.2)
ALBUMIN SERPL-MCNC: 3.5 GM/DL (ref 3.4–4.8)
ALBUMIN/GLOB SERPL: 1.1 RATIO (ref 1.1–2)
ALP SERPL-CCNC: 95 UNIT/L (ref 40–150)
ALT SERPL-CCNC: 11 UNIT/L (ref 0–55)
AMPHET UR QL SCN: NEGATIVE
AST SERPL-CCNC: 16 UNIT/L (ref 5–34)
BARBITURATE SCN PRESENT UR: NEGATIVE
BASOPHILS # BLD AUTO: 0 X10(3)/MCL (ref 0–0.2)
BASOPHILS NFR BLD AUTO: 1 %
BENZODIAZ UR QL SCN: NEGATIVE
BILIRUB SERPL-MCNC: 0.5 MG/DL
BILIRUBIN DIRECT+TOT PNL SERPL-MCNC: 0.2 MG/DL (ref 0–0.5)
BILIRUBIN DIRECT+TOT PNL SERPL-MCNC: 0.3 MG/DL (ref 0–0.8)
BNP BLD-MCNC: 1057.6 PG/ML
BUN SERPL-MCNC: 8.1 MG/DL (ref 8.4–25.7)
CALCIUM SERPL-MCNC: 9.5 MG/DL (ref 8.7–10.5)
CANNABINOIDS UR QL SCN: NEGATIVE
CHLORIDE SERPL-SCNC: 100 MMOL/L (ref 98–107)
CK MB SERPL-MCNC: 1.2 NG/ML
CK SERPL-CCNC: 68 U/L (ref 30–200)
CO2 SERPL-SCNC: 25 MMOL/L (ref 23–31)
COCAINE UR QL SCN: NEGATIVE
CREAT SERPL-MCNC: 0.91 MG/DL (ref 0.73–1.18)
EOSINOPHIL # BLD AUTO: 0.2 X10(3)/MCL (ref 0–0.9)
EOSINOPHIL NFR BLD AUTO: 2 %
ERYTHROCYTE [DISTWIDTH] IN BLOOD BY AUTOMATED COUNT: 13.2 % (ref 11.5–14.5)
ETHANOL SERPL-MCNC: 43 MG/DL
FENTANYL UR QL SCN: NEGATIVE
GLOBULIN SER-MCNC: 3.2 GM/DL (ref 2.4–3.5)
GLUCOSE SERPL-MCNC: 101 MG/DL (ref 82–115)
HCT VFR BLD AUTO: 40.9 % (ref 40–51)
HGB BLD-MCNC: 13.4 GM/DL (ref 13.5–17.5)
IMM GRANULOCYTES # BLD AUTO: 0.01 10*3/UL
IMM GRANULOCYTES NFR BLD AUTO: 0 %
LYMPHOCYTES # BLD AUTO: 0.9 X10(3)/MCL (ref 0.6–4.6)
LYMPHOCYTES NFR BLD AUTO: 11 %
MCH RBC QN AUTO: 27.7 PG (ref 26–34)
MCHC RBC AUTO-ENTMCNC: 32.8 GM/DL (ref 31–37)
MCV RBC AUTO: 84.7 FL (ref 80–100)
MDMA UR QL SCN: NEGATIVE
MONOCYTES # BLD AUTO: 0.8 X10(3)/MCL (ref 0.1–1.3)
MONOCYTES NFR BLD AUTO: 10 %
NEUTROPHILS # BLD AUTO: 5.77 X10(3)/MCL (ref 2.1–9.2)
NEUTROPHILS NFR BLD AUTO: 75 %
NRBC BLD AUTO-RTO: 0 % (ref 0–0.2)
OPIATES UR QL SCN: NEGATIVE
PCP UR QL: NEGATIVE
PH UR STRIP.AUTO: 5.5 [PH] (ref 3–11)
PLATELET # BLD AUTO: 342 X10(3)/MCL (ref 130–400)
PMV BLD AUTO: 8.7 FL (ref 7.4–10.4)
POTASSIUM SERPL-SCNC: 3.5 MMOL/L (ref 3.5–5.1)
PROT SERPL-MCNC: 6.7 GM/DL (ref 5.8–7.6)
RBC # BLD AUTO: 4.83 X10(6)/MCL (ref 4.5–5.9)
SODIUM SERPL-SCNC: 136 MMOL/L (ref 136–145)
T4 FREE SERPL-MCNC: 1.95 NG/DL (ref 0.7–1.48)
TROPONIN I SERPL-MCNC: 0.02 NG/ML (ref 0–0.04)
TSH SERPL-ACNC: <0.0083 UIU/ML (ref 0.35–4.94)
WBC # SPEC AUTO: 7.7 X10(3)/MCL (ref 4.5–11)

## 2021-12-17 LAB
ABS NEUT (OLG): 4.27 X10(3)/MCL (ref 2.1–9.2)
BASOPHILS # BLD AUTO: 0 X10(3)/MCL (ref 0–0.2)
BASOPHILS NFR BLD AUTO: 1 %
BUN SERPL-MCNC: 7.8 MG/DL (ref 8.4–25.7)
CALCIUM SERPL-MCNC: 9.3 MG/DL (ref 8.7–10.5)
CHLORIDE SERPL-SCNC: 101 MMOL/L (ref 98–107)
CO2 SERPL-SCNC: 27 MMOL/L (ref 23–31)
CREAT SERPL-MCNC: 0.81 MG/DL (ref 0.73–1.18)
CREAT/UREA NIT SERPL: 10
EOSINOPHIL # BLD AUTO: 0.2 X10(3)/MCL (ref 0–0.9)
EOSINOPHIL NFR BLD AUTO: 3 %
ERYTHROCYTE [DISTWIDTH] IN BLOOD BY AUTOMATED COUNT: 13.3 % (ref 11.5–14.5)
GLUCOSE SERPL-MCNC: 93 MG/DL (ref 82–115)
HCT VFR BLD AUTO: 40.1 % (ref 40–51)
HGB BLD-MCNC: 13.1 GM/DL (ref 13.5–17.5)
IMM GRANULOCYTES # BLD AUTO: 0.03 10*3/UL
IMM GRANULOCYTES NFR BLD AUTO: 0 %
LYMPHOCYTES # BLD AUTO: 1.2 X10(3)/MCL (ref 0.6–4.6)
LYMPHOCYTES NFR BLD AUTO: 18 %
MAGNESIUM SERPL-MCNC: 1.6 MG/DL (ref 1.6–2.6)
MCH RBC QN AUTO: 28.1 PG (ref 26–34)
MCHC RBC AUTO-ENTMCNC: 32.7 GM/DL (ref 31–37)
MCV RBC AUTO: 85.9 FL (ref 80–100)
MONOCYTES # BLD AUTO: 0.7 X10(3)/MCL (ref 0.1–1.3)
MONOCYTES NFR BLD AUTO: 11 %
NEUTROPHILS # BLD AUTO: 4.27 X10(3)/MCL (ref 2.1–9.2)
NEUTROPHILS NFR BLD AUTO: 66 %
NRBC BLD AUTO-RTO: 0 % (ref 0–0.2)
PHOSPHATE SERPL-MCNC: 3.3 MG/DL (ref 2.3–4.7)
PLATELET # BLD AUTO: 282 X10(3)/MCL (ref 130–400)
PMV BLD AUTO: 9 FL (ref 7.4–10.4)
POTASSIUM SERPL-SCNC: 3.5 MMOL/L (ref 3.5–5.1)
RBC # BLD AUTO: 4.67 X10(6)/MCL (ref 4.5–5.9)
SARS-COV-2 AG RESP QL IA.RAPID: NEGATIVE
SODIUM SERPL-SCNC: 136 MMOL/L (ref 136–145)
TROPONIN I SERPL-MCNC: 0.02 NG/ML (ref 0–0.04)
WBC # SPEC AUTO: 6.4 X10(3)/MCL (ref 4.5–11)

## 2022-02-15 ENCOUNTER — HISTORICAL (OUTPATIENT)
Dept: INTERNAL MEDICINE | Facility: CLINIC | Age: 62
End: 2022-02-15

## 2022-02-15 LAB
ABS NEUT (OLG): 7.43 (ref 2.1–9.2)
ALBUMIN SERPL-MCNC: 3.9 G/DL (ref 3.4–4.8)
ALBUMIN/GLOB SERPL: 1.1 {RATIO} (ref 1.1–2)
ALP SERPL-CCNC: 111 U/L (ref 40–150)
ALT SERPL-CCNC: 11 U/L (ref 0–55)
AST SERPL-CCNC: 16 U/L (ref 5–34)
BASOPHILS # BLD AUTO: 0.1 10*3/UL (ref 0–0.2)
BASOPHILS NFR BLD AUTO: 1 %
BILIRUB SERPL-MCNC: 0.6 MG/DL
BILIRUBIN DIRECT+TOT PNL SERPL-MCNC: 0.2 (ref 0–0.5)
BILIRUBIN DIRECT+TOT PNL SERPL-MCNC: 0.4 (ref 0–0.8)
BUN SERPL-MCNC: 15.2 MG/DL (ref 8.4–25.7)
CALCIUM SERPL-MCNC: 9.9 MG/DL (ref 8.7–10.5)
CHLORIDE SERPL-SCNC: 95 MMOL/L (ref 98–107)
CHOLEST SERPL-MCNC: 223 MG/DL
CHOLEST/HDLC SERPL: 6 {RATIO} (ref 0–5)
CO2 SERPL-SCNC: 30 MMOL/L (ref 23–31)
CREAT SERPL-MCNC: 1.51 MG/DL (ref 0.73–1.18)
EOSINOPHIL # BLD AUTO: 0.6 10*3/UL (ref 0–0.9)
EOSINOPHIL NFR BLD AUTO: 5 %
ERYTHROCYTE [DISTWIDTH] IN BLOOD BY AUTOMATED COUNT: 14.4 % (ref 11.5–14.5)
EST. AVERAGE GLUCOSE BLD GHB EST-MCNC: 119.8 MG/DL
FLAG2 (OHS): 60
FLAG3 (OHS): 80
FLAGS (OHS): 80
GLOBULIN SER-MCNC: 3.4 G/DL (ref 2.4–3.5)
GLUCOSE SERPL-MCNC: 102 MG/DL (ref 82–115)
HBA1C MFR BLD: 5.8 %
HCT VFR BLD AUTO: 49.1 % (ref 40–51)
HDLC SERPL-MCNC: 39 MG/DL (ref 35–60)
HEMOLYSIS INTERF INDEX SERPL-ACNC: 6
HGB BLD-MCNC: 15.5 G/DL (ref 13.5–17.5)
ICTERIC INTERF INDEX SERPL-ACNC: 1
IMM GRANULOCYTES # BLD AUTO: 0.07 10*3/UL
IMM GRANULOCYTES NFR BLD AUTO: 1 %
IMM. NE 1 SUSPECT FLAG (OHS): 10
LDLC SERPL CALC-MCNC: 144 MG/DL (ref 50–140)
LIPEMIC INTERF INDEX SERPL-ACNC: 9
LOW EVENT # SUSPECT FLAG (OHS): 80
LYMPHOCYTES # BLD AUTO: 2.2 10*3/UL (ref 0.6–4.6)
LYMPHOCYTES NFR BLD AUTO: 20 %
MANUAL DIFF? (OHS): NO
MCH RBC QN AUTO: 27.2 PG (ref 26–34)
MCHC RBC AUTO-ENTMCNC: 31.6 G/DL (ref 31–37)
MCV RBC AUTO: 86.1 FL (ref 80–100)
MO BLASTS SUSPECT FLAG (OHS): 30
MONOCYTES # BLD AUTO: 0.9 10*3/UL (ref 0.1–1.3)
MONOCYTES NFR BLD AUTO: 8 %
NEUTROPHILS # BLD AUTO: 7.43 10*3/UL (ref 2.1–9.2)
NEUTROPHILS NFR BLD AUTO: 65 %
NRBC BLD AUTO-RTO: 0 % (ref 0–0.2)
PLATELET # BLD AUTO: 386 10*3/UL (ref 130–400)
PLATELET CLUMPS SUSPECT FLAG (OHS): 10
PMV BLD AUTO: 9.6 FL (ref 7.4–10.4)
POTASSIUM SERPL-SCNC: 4.3 MMOL/L (ref 3.5–5.1)
PROT SERPL-MCNC: 7.3 G/DL (ref 5.8–7.6)
RBC # BLD AUTO: 5.7 10*6/UL (ref 4.5–5.9)
SODIUM SERPL-SCNC: 132 MMOL/L (ref 136–145)
T4 SERPL-MCNC: 6.85 UG/DL (ref 4.87–11.72)
TRIGL SERPL-MCNC: 198 MG/DL (ref 34–140)
TSH SERPL-ACNC: 0.05 M[IU]/L (ref 0.35–4.94)
VLDLC SERPL CALC-MCNC: 40 MG/DL
WBC # SPEC AUTO: 11.4 10*3/UL (ref 4.5–11)

## 2022-03-16 ENCOUNTER — HISTORICAL (OUTPATIENT)
Dept: ADMINISTRATIVE | Facility: HOSPITAL | Age: 62
End: 2022-03-16

## 2022-03-16 ENCOUNTER — HISTORICAL (OUTPATIENT)
Dept: CARDIOLOGY | Facility: HOSPITAL | Age: 62
End: 2022-03-16

## 2022-03-16 LAB
ALBUMIN SERPL-MCNC: 4.4 G/DL (ref 3.4–4.8)
ALBUMIN/GLOB SERPL: 1.4 {RATIO} (ref 1.1–2)
ALP SERPL-CCNC: 194 U/L (ref 40–150)
ALT SERPL-CCNC: 16 U/L (ref 0–55)
AST SERPL-CCNC: 19 U/L (ref 5–34)
BILIRUB SERPL-MCNC: 0.8 MG/DL
BILIRUBIN DIRECT+TOT PNL SERPL-MCNC: 0.3 (ref 0–0.5)
BILIRUBIN DIRECT+TOT PNL SERPL-MCNC: 0.5 (ref 0–0.8)
BUN SERPL-MCNC: 15.3 MG/DL (ref 8.4–25.7)
CALCIUM SERPL-MCNC: 9.9 MG/DL (ref 8.7–10.5)
CHLORIDE SERPL-SCNC: 95 MMOL/L (ref 98–107)
CO2 SERPL-SCNC: 29 MMOL/L (ref 23–31)
CREAT SERPL-MCNC: 1.56 MG/DL (ref 0.73–1.18)
GLOBULIN SER-MCNC: 3.1 G/DL (ref 2.4–3.5)
GLUCOSE SERPL-MCNC: 96 MG/DL (ref 82–115)
HEMOLYSIS INTERF INDEX SERPL-ACNC: 4
ICTERIC INTERF INDEX SERPL-ACNC: 1
LIPEMIC INTERF INDEX SERPL-ACNC: <0
POTASSIUM SERPL-SCNC: 4 MMOL/L (ref 3.5–5.1)
PROT SERPL-MCNC: 7.5 G/DL (ref 5.8–7.6)
SODIUM SERPL-SCNC: 134 MMOL/L (ref 136–145)
T4 FREE SERPL-MCNC: 1 NG/DL (ref 0.7–1.48)
TSH SERPL-ACNC: 9.08 M[IU]/L (ref 0.35–4.94)

## 2022-03-23 ENCOUNTER — HISTORICAL (OUTPATIENT)
Dept: CARDIOLOGY | Facility: HOSPITAL | Age: 62
End: 2022-03-23

## 2022-03-23 LAB
BUN SERPL-MCNC: 17.9 MG/DL (ref 8.4–25.7)
CALCIUM SERPL-MCNC: 9.9 MG/DL (ref 8.7–10.5)
CHLORIDE SERPL-SCNC: 98 MMOL/L (ref 98–107)
CO2 SERPL-SCNC: 30 MMOL/L (ref 23–31)
CREAT SERPL-MCNC: 1.41 MG/DL (ref 0.73–1.18)
CREAT/UREA NIT SERPL: 13
GLUCOSE SERPL-MCNC: 106 MG/DL (ref 82–115)
HEMOLYSIS INTERF INDEX SERPL-ACNC: 7
HEMOLYSIS INTERF INDEX SERPL-ACNC: 7
ICTERIC INTERF INDEX SERPL-ACNC: 0
LIPEMIC INTERF INDEX SERPL-ACNC: 6
MAGNESIUM SERPL-MCNC: 2 MG/DL (ref 1.6–2.6)
POTASSIUM SERPL-SCNC: 3.9 MMOL/L (ref 3.5–5.1)
SODIUM SERPL-SCNC: 135 MMOL/L (ref 136–145)

## 2022-04-10 ENCOUNTER — HISTORICAL (OUTPATIENT)
Dept: ADMINISTRATIVE | Facility: HOSPITAL | Age: 62
End: 2022-04-10
Payer: MEDICAID

## 2022-04-19 ENCOUNTER — HISTORICAL (OUTPATIENT)
Dept: ENDOSCOPY | Facility: HOSPITAL | Age: 62
End: 2022-04-19
Payer: MEDICAID

## 2022-04-19 ENCOUNTER — HISTORICAL (OUTPATIENT)
Dept: ADMINISTRATIVE | Facility: HOSPITAL | Age: 62
End: 2022-04-19
Payer: MEDICAID

## 2022-04-28 VITALS
OXYGEN SATURATION: 100 % | BODY MASS INDEX: 27.03 KG/M2 | SYSTOLIC BLOOD PRESSURE: 97 MMHG | DIASTOLIC BLOOD PRESSURE: 41 MMHG | HEIGHT: 65 IN | WEIGHT: 162.25 LBS

## 2022-04-30 NOTE — ED PROVIDER NOTES
"   Patient:   Nicole Garcia             MRN: [id number]            FIN: [id number]               Age:   60 years     Sex:  Male     :  1960   Associated Diagnoses:   Defibrillator discharge; Cardiac dysrhythmia   Author:   Simon Gonzalez MD      Basic Information   Time seen: Date & time 2021 14:28:00.   History source: Patient.   Arrival mode: Private vehicle.   History limitation: None.   Additional information: Chief Complaint from Nursing Triage Note : Chief Complaint   2021 14:30 CDT      Chief Complaint           Pt AAOX3. States he was lyiing down when his defibrillator went off. Denies chest pain as well as SOB at this time.  .   Provider/Visit info:   Time Seen:  Simon Gonzalez MD / 2021 14:28  .   History of Present Illness   The patient presents with "my device shocked me this morning" .  The course/duration of symptoms is resolved: lasted 3 seconds.  The character of symptoms is no pain.  The degree at present is none.  Risk factors consist of CHF with ischemic cardiomyopathy s/p AICD implant ( ECHO 10-15%) .  Therapy today: see nurses notes.  Associated symptoms: denies chest pain, denies abdominal pain, denies nausea, denies vomiting, denies shortness of breath, denies fever, denies chills, denies headache, denies dizziness and denies back pain.        Review of Systems   Constitutional symptoms:  No fever,    Skin symptoms:  No rash,    Eye symptoms:  Vision unchanged.   ENMT symptoms:  Negative except as documented in HPI.   Respiratory symptoms:  No shortness of breath,    Cardiovascular symptoms:  No chest pain,    Gastrointestinal symptoms:  No abdominal pain, no nausea, no vomiting.    Neurologic symptoms:  No headache, no dizziness.              Additional review of systems information: All other systems reviewed and otherwise negative.      Health Status   Allergies:    Allergic Reactions (Selected)  Severity Not Documented  Egg- No reactions were documented.  Influenza " virus vaccine, inactivated- Allergy to eggs. and allergy to eggs..,    Allergies (2) Active Reaction  Egg None Documented  influenza virus vaccine, Allergy to eggs.  inactivated  .   Medications: Per nurse's notes.      Past Medical/ Family/ Social History   Medical history:    Resolved  Defibrillator, device ([id number]): Onset on 6/15/2011 at 50 years.  Resolved.  Defibrillator, device ([id number]): Onset on 6/15/2011 at 50 years.  Resolved., Reviewed as documented in chart.   Surgical history:    Incision & Drainage (Left, Neck) on 3/18/2016 at 55 Years.  Comments:  3/18/2016 11:34 CDT - Antoni CHRISTENSEN, Suha TEJEDA  auto-populated from documented surgical case  Defibrillator, device ([id number]) on 6/15/2011 at 50 Years.  ICD - Internal cardiac defibrillator procedure ([id number])., Reviewed as documented in chart.   Family history:    Heart disease  Father (Brock Garcia, )  Mother (Bella Garcia, )  Brother  Cardiovascular disease  Brother (Reji Garcia, )  Primary malignant neoplasm of brain  Brother (Prieto Garcia, )  Acute myocardial infarction.  Brother (Tk Garcia, )  Cardiac arrest.  Brother (Tk Garcia, )  , Reviewed as documented in chart.   Social history:    Social & Psychosocial Habits    Alcohol  2015 Risk Assessment: Denies Alcohol Use    2021  Use: Past    Employment/School  2015  Status: Employed    Activity level: Moderate physical work    Highest education: High school    Hazardous equipment operation: No    Work hazards: Hazardous materials, Heavy lifting/twisting, Loud noises, Repetitive motion    Exercise  2015  Duration (average number of minutes): 48    Times per week: Daily    Exercise type: Walking    Comment: Does lots of walking at work. - 2015 10:43 - Luann Higgins RN    2015  Self assessment: Good condition    Home/Environment  2015  Lives with: Alone, Children, Significant other    Living  situation: Home/Independent    Home equipment: CPAP/BiPAP    Alcohol abuse in household: No    Substance abuse in household: No    Smoker in household: Yes    Injuries/Abuse/Neglect in household: No    Feels unsafe at home: No    Safe place to go: Yes    Family/Friends available to help: Yes    Concern for family members at home: No    Major illness in household: No    Financial concerns: Yes    Concerns over TV/Computer/Game use: No    Nutrition/Health  03/11/2015  Type of diet: Coumadin.    Caffeine intake amount: 2-3 cokes 12 ounce daily.    Wants to lose weight: No    Sleeping concerns: No    Feels highly stressed: No    Sexual    Comment: Personal. - 03/11/2015 10:44 - Luann Higgins RN    01/22/2019  What is your current gender identity? (Check all that apply) Identifies as male    Substance Use  03/11/2015 Risk Assessment: Denies Substance Abuse    04/30/2018  Use: Past    Tobacco  05/17/2021  Use: 4 or less cigarettes(less    Patient Wants Consult For Cessation Counseling No    07/22/2021  Use: Former smoker, quit more    Type: Cigarettes    Patient Wants Consult For Cessation Counseling N/A    11/06/2021  Use: Former smoker, quit more    Patient Wants Consult For Cessation Counseling No    Abuse/Neglect  05/17/2021  SHX Any signs of abuse or neglect No    Feels unsafe at home: No    Safe place to go: Yes    11/06/2021  SHX Any signs of abuse or neglect No    Spiritual/Cultural  07/22/2021  Latter day Preference Latter-day  , Reviewed as documented in chart.   Problem list:    Active Problems (10)  CAD S/P percutaneous coronary angioplasty   Gas exchange impairment   HLD - Hyperlipidemia   HTN (hypertension)   Impaired mobility   Obesity   SVT - Supraventricular tachycardia   Systolic heart failure   Tobacco user   VT (ventricular tachycardia)   .      Physical Examination               Vital Signs   Vital Signs   11/6/2021 15:22 CDT      Peripheral Pulse Rate     91 bpm                             Respiratory  Rate          16 br/min                             SpO2                      99 %                             Oxygen Therapy            Room air                             Systolic Blood Pressure   113 mmHg                             Diastolic Blood Pressure  104 mmHg  HI                             Mean Arterial Pressure, Cuff              107 mmHg    11/6/2021 14:30 CDT      Temperature Oral          37.3 DegC                             Temperature Oral (calculated)             99.14 DegF                             Peripheral Pulse Rate     110 bpm  HI                             Respiratory Rate          16 br/min                             SpO2                      98 %                             Oxygen Therapy            Room air                             Systolic Blood Pressure   127 mmHg                             Diastolic Blood Pressure  59 mmHg  LOW  .      Vital Signs (last 24 hrs)_____  Last Charted___________  Temp Oral     37.3 DegC  (NOV 06 14:30)  Heart Rate Peripheral   91 bpm  (NOV 06 15:22)  Resp Rate         16 br/min  (NOV 06 15:22)  SBP      113 mmHg  (NOV 06 15:22)  DBP      H 104mmHg  (NOV 06 15:22)  SpO2      99 %  (NOV 06 15:22)  Weight      74 kg  (NOV 06 14:30)  Height      165 cm  (NOV 06 14:30)  BMI      27.18  (NOV 06 14:30)  .   General:  Alert, no acute distress.    Skin:  Warm, intact.    Head:  Normocephalic, atraumatic.    Neck:  Trachea midline.   Eye:  Normal conjunctiva.   Ears, nose, mouth and throat:  Oral mucosa moist.   Cardiovascular:  Regular rate and rhythm, Normal peripheral perfusion, No edema.    Respiratory:  Lungs are clear to auscultation, respirations are non-labored, breath sounds are equal.    Chest wall:  No tenderness.   Gastrointestinal:  Soft, Nontender, Non distended, Normal bowel sounds.    Neurological:  Alert and oriented to person, place, time, and situation, No focal neurological deficit observed, Gait: Normal.    Psychiatric:  Cooperative.       Medical Decision Making   Documents reviewed:  Emergency department nurses' notes, emergency department records.    Electrocardiogram:  Time 11/6/2021 14:39:00, rate 105, No ST-T changes, no ectopy, normal NM & QRS intervals, EP Interp.    Results review:  Lab results : Lab View   11/6/2021 15:52 CDT      Est Creat Clearance Ser   67.07 mL/min    11/6/2021 15:16 CDT      Sodium Lvl                139 mmol/L                             Potassium Lvl             3.6 mmol/L                             Chloride                  100 mmol/L                             CO2                       27 mmol/L                             Calcium Lvl               9.8 mg/dL                             Glucose Lvl               110 mg/dL                             BUN                       11.0 mg/dL                             Creatinine                1.03 mg/dL                             BUN/Creat Ratio           11  NA                             eGFR-AA                   95                             eGFR-NGOC                  79 mL/min/1.73 m2  LOW                             Troponin-I                0.021 ng/mL                             WBC                       8.5 x10(3)/mcL                             RBC                       4.99 x10(6)/mcL                             Hgb                       14.2 gm/dL                             Hct                       43.3 %                             Platelet                  330 x10(3)/mcL                             MCV                       86.8 fL                             MCH                       28.5 pg                             MCHC                      32.8 gm/dL                             RDW                       14.1 %                             MPV                       9.5 fL                             Abs Neut                  6.52 x10(3)/mcL                             Neutro Auto               76 %  NA                             Lymph Auto                 10 %  NA                             Mono Auto                 10 %  NA                             Eos Auto                  3 %  NA                             Abs Eos                   0.2 x10(3)/mcL                             Basophil Auto             1 %  NA                             Abs Neutro                6.52 x10(3)/mcL                             Abs Lymph                 0.8 x10(3)/mcL                             Abs Mono                  0.8 x10(3)/mcL                             Abs Baso                  0.1 x10(3)/mcL                             NRBC%                     0.0 %                             IG%                       0 %  NA                             IG#                       0.030  NA  , Interpretation Normal results.    Radiology results:  Reviewed radiologist's report, Rad Results (ST)  < 12 hrs   Accession: [id number]  Order: XR Chest 1 View  Report Dt/Tm: 11/06/2021 15:42  Report:   CHEST 1 VIEW (PORTABLE):     HISTORY: Other (please specify)           FINDINGS: Correlation 5/7/2021  Lungs are well aerated. No shift of the mediastinum. Dual-lead  pacemaker present. No pleural effusion or pneumothorax.     IMPRESSION:  No active pulmonary finding         .    Notes:  Reviewed interrogation report- pt with multiple episodes of VT and SVT.  Discussed admission with patient who agrees.  .      Impression and Plan   Diagnosis   Defibrillator discharge ([id number] Z45.02)   Cardiac dysrhythmia ([id number] I49.9)      Calls-Consults   -  11/6/2021 16:13:00 , Internal Medicine, recommends admission.    Plan   Condition: Stable.    Disposition: Admit.    Counseled: Patient, Regarding diagnosis, Regarding diagnostic results, Regarding treatment plan, Patient indicated understanding of instructions.

## 2022-04-30 NOTE — ED PROVIDER NOTES
Patient:   Nicole Garcia             MRN: [id number]            FIN: [id number]               Age:   60 years     Sex:  Male     :  1960   Associated Diagnoses:   Acute exacerbation of CHF (congestive heart failure)   Author:   Asuncion Mcconnell MD      Basic Information   Additional information: Chief Complaint from Nursing Triage Note : Chief Complaint   2021 8:14 CDT       Chief Complaint           Pt reports SOB and a cough for 1 week. Bilateral wheezing noted and in no obvious respiratory distress.  .      History of Present Illness   The patient presents with difficulty breathing and Mr Garcia is a  60-year-old gentleman with a history of nonischemic cardiomyopathy HFrEF EF 10% with AICD placed in , history of V. tach, SVT, A. fib on Coumadin at this time, complaining of a one-week history of shortness of breath and productive cough getting progressively worse.  He endorses orthopnea, cannot lie flat at all due to shortness of breath.  He complains of mild lower extremity swelling.  He complains of back pain which is chronic.  He denies COVID-19 exposure stating that he never leaves his house..  The onset was 1  weeks ago.  The course/duration of symptoms is worsening.  Degree at onset moderate.  Degree at present severe.  There are Exacerbating factorss including exertion and lying flat.  The Relieving factors is sitting upright.  Risk factors consist of congestive heart failure and chronic obstructive pulmonary disease.  Prior episodes: occasional.  Associated symptoms: Abdominal bloating,  back pain which is chronic, mild lower extremity swelling and denies chest pain.        Review of Systems   Respiratory symptoms:  Shortness of breath.   Gastrointestinal symptoms:  Abdominal bloating.   Musculoskeletal symptoms:  Back pain.             Additional review of systems information: All other systems reviewed and otherwise negative.      Health Status   Allergies:    Allergic  Reactions (Selected)  Severity Not Documented  Egg- No reactions were documented.  Influenza virus vaccine, inactivated- Allergy to eggs. and allergy to eggs...   Medications:  (Selected)   Inpatient Medications  Ordered  DuoNeb 0.5 mg-2.5 mg/3 mL inhalation solution: 3 mL, form: Soln, NEB, Now, first dose 04/26/21 8:41:00 CDT, stop date 04/26/21 8:41:00 CDT, STAT  aspirin 325 mg oral tablet: 325 mg, form: Tab, Oral, Once-NOW, first dose 04/26/21 8:36:00 CDT, stop date 04/26/21 8:36:00 CDT, STAT  Prescriptions  Prescribed  Albuterol (Eqv-ProAir HFA) 90 mcg/inh inhalation aerosol: See Instructions, USE 2 PUFFS EVERY 4 HOURS AS NEEDED FOR WHEEZING, # 8.5 gm, 0 Refill(s), Pharmacy: [facility], 165, cm, Height/Length Dosing, 03/04/21 9:48:00 CST, 75.7, kg, Weight Dosing, 03/04/21 9:48:00 CST  Entresto 24 mg-26 mg oral tablet: 1 tab(s), Oral, BID, # 60 tab(s), 11 Refill(s), Pharmacy: [facility], 162, cm, Height/Length Dosing, 01/27/21 11:15:00 CST, 72.2, kg, Weight Dosing, 01/27/21 11:15:00 CST  Lasix 20 mg oral tablet: 20 mg = 1 tab(s), Oral, Daily, # 90 tab(s), 2 Refill(s), Pharmacy: [facility], 162, cm, Height/Length Dosing, 01/27/21 11:15:00 CST, 72.2, kg, Weight Dosing, 01/27/21 11:15:00 CST  amiodarone 200 mg oral Tab: See Instructions, TAKE ONE TABLET BY MOUTH DAILY, # 90 tab(s), 3 Refill(s), Pharmacy: [facility], 162, cm, Height/Length Dosing, 09/03/19 9:49:00 CDT, 74.7, kg, Weight Dosing, 09/03/19 9:49:00 CDT  aspirin 81 mg oral tablet: 81 mg = 1 tab(s), Oral, Daily, 1 tab(s) ( 81 mg ), PO, Daily, # 30 tab(s), 0 Refill(s), Type: Maintenance, # 90 tab(s), 3 Refill(s), [facility]  atorvastatin 80 mg oral tablet: 80 mg = 1 tab(s), Oral, Daily, # 90 tab(s), 3 Refill(s)  carvedilol 25 mg oral tablet: See Instructions, TAKE 2 TAB(S) BY MOUTH TWO TIMES A DAY, # 360 tab(s), 3 Refill(s), Pharmacy: [facility], 162, cm,  Height/Length Dosing, 09/03/19 9:49:00 CDT, 74.7, kg, Weight Dosing, 09/03/19 9:49:00 CDT  potassium chloride 10 mEq oral tablet, extended release: 10 mEq = 1 tab(s), Oral, BID, # 180 tab(s), 2 Refill(s), Pharmacy: [facility], 162, cm, Height/Length Dosing, 01/27/21 11:15:00 CST, 72.2, kg, Weight Dosing, 01/27/21 11:15:00 CST  spironolactone 25 mg oral tablet: 25 mg = 1 tab(s), Oral, Daily, with meals, # 90 tab(s), 2 Refill(s), Pharmacy: [facility], 162, cm, Height/Length Dosing, 01/27/21 11:15:00 CST, 72.2, kg, Weight Dosing, 01/27/21 11:15:00 CST  warfarin 2 mg oral tablet: 2 mg = 1 tab(s), Oral, At Bedtime, # 30 tab(s), 1 Refill(s), Pharmacy: [facility]  warfarin 2 mg oral tablet: 2 mg = 1 tab(s), Oral, At Bedtime, # 30 tab(s), 1 Refill(s), Pharmacy: [facility], 165, cm, Height/Length Dosing, 03/04/21 9:48:00 CST, 75.7, kg, Weight Dosing, 03/04/21 9:48:00 CST  warfarin 2 mg oral tablet: 2 mg = 1 tab(s), Oral, At Bedtime, # 30 tab(s), 2 Refill(s), Pharmacy: [facility], 168, cm, Height/Length Dosing, 12/10/20 8:15:00 CST, 70, kg, Weight Dosing, 12/10/20 8:15:00 CST  Documented Medications  Documented  Vitamin D: 1 tab, Oral, Daily, 0 Refill(s)  warfarin 1 mg oral tablet: 1 mg = 1 tab(s), Oral, At Bedtime, # 30 tab(s), 0 Refill(s).      Past Medical/ Family/ Social History   Medical history:    Resolved  Defibrillator, device ([id number]): Onset on 6/15/2011 at 50 years.  Resolved.  Defibrillator, device ([id number]): Onset on 6/15/2011 at 50 years.  Resolved., Reviewed as documented in chart.   Surgical history:    Incision & Drainage (Left, Neck) on 3/18/2016 at 55 Years.  Comments:  3/18/2016 11:34 CDT - Antoni CHRISTENSEN, Suha TEJEDA  auto-populated from documented surgical case  Defibrillator, device ([id number]) on 6/15/2011 at 50 Years.  ICD - Internal cardiac defibrillator procedure ([id number])., Reviewed as documented in chart.   Family  history:    Heart disease  Father (Brock Garcia, )  Mother (Bella Garcia, )  Brother  Cardiovascular disease  Brother (Reji Garcia, )  Primary malignant neoplasm of brain  Brother (Prieto Garcia, )  Acute myocardial infarction.  Brother (Tk Garcia, )  Cardiac arrest.  Brother (Tk Garcia, )  , Reviewed as documented in chart.   Social history:    Social & Psychosocial Habits    Alcohol  2015 Risk Assessment: Denies Alcohol Use    2018  Use: Past    Employment/School  2015  Status: Employed    Activity level: Moderate physical work    Highest education: High school    Hazardous equipment operation: No    Work hazards: Hazardous materials, Heavy lifting/twisting, Loud noises, Repetitive motion    Exercise  2015  Duration (average number of minutes): 48    Times per week: Daily    Exercise type: Walking    Comment: Does lots of walking at work. - 2015 10:43 - Luann Higgins RN    2015  Self assessment: Good condition    Home/Environment  2015  Lives with: Alone, Children, Significant other    Living situation: Home/Independent    Home equipment: CPAP/BiPAP    Alcohol abuse in household: No    Substance abuse in household: No    Smoker in household: Yes    Injuries/Abuse/Neglect in household: No    Feels unsafe at home: No    Safe place to go: Yes    Family/Friends available to help: Yes    Concern for family members at home: No    Major illness in household: No    Financial concerns: Yes    Concerns over TV/Computer/Game use: No    Nutrition/Health  2015  Type of diet: Coumadin.    Caffeine intake amount: 2-3 cokes 12 ounce daily.    Wants to lose weight: No    Sleeping concerns: No    Feels highly stressed: No    Sexual    Comment: Personal. - 2015 10:44 Luann Chahal RN    2019  What is your current gender identity? (Check all that apply) Identifies as male    Substance Use  2015 Risk  Assessment: Denies Substance Abuse    04/30/2018  Use: Past    Tobacco  02/08/2021  Use: 10 or more cigarettes (1/    Patient Wants Consult For Cessation Counseling No    02/24/2021  Use: Cigars or pipes daily wit    Patient Wants Consult For Cessation Counseling No    04/20/2021  Use: 4 or less cigarettes(less    Patient Wants Consult For Cessation Counseling Yes    04/26/2021  Use: 10 or more cigarettes (1/    Patient Wants Consult For Cessation Counseling No    Abuse/Neglect  02/08/2021  SHX Any signs of abuse or neglect No    Feels unsafe at home: No    Safe place to go: Yes    02/24/2021  SHX Any signs of abuse or neglect No    04/20/2021  SHX Any signs of abuse or neglect No    04/26/2021  SHX Any signs of abuse or neglect No    Feels unsafe at home: No    Safe place to go: Yes  , Tobacco use: Regularly.   Problem list:    Active Problems (8)  Gas exchange impairment   HLD - Hyperlipidemia   HTN (hypertension)   Obesity   SVT - Supraventricular tachycardia   Systolic heart failure   Tobacco user   VT (ventricular tachycardia)   , per nurse's notes.      Physical Examination               Vital Signs   Vital Signs   4/26/2021 8:14 CDT       Temperature Oral          37 DegC                             Temperature Oral (calculated)             98.60 DegF                             Peripheral Pulse Rate     73 bpm                             Respiratory Rate          16 br/min                             SpO2                      99 %                             Oxygen Therapy            Room air                             Systolic Blood Pressure   156 mmHg  HI                             Diastolic Blood Pressure  90 mmHg  .      Vital Signs (last 24 hrs)_____  Last Charted___________  Temp Oral     37 DegC  (APR 26 08:14)  Heart Rate Peripheral   80 bpm  (APR 26 10:27)  Resp Rate         18 br/min  (APR 26 10:27)  SBP      122 mmHg  (APR 26 10:27)  DBP      76 mmHg  (APR 26 10:27)  SpO2      100 %  (APR 26  10:27)  .   Basic Oxygen Information   4/26/2021 8:14 CDT       SpO2                      99 %                             Oxygen Therapy            Room air  .   General:  Alert, no acute distress.    Skin:  Warm, dry, pink, intact.    Eye:  Pupils are equal, round and reactive to light.   Neck:  Supple, JVD to the mandible.    Cardiovascular:  Regular rate and rhythm.   Respiratory:  Lungs are clear to auscultation.   Gastrointestinal:  Soft, Nontender.    Musculoskeletal:  Normal ROM, normal strength, no tenderness, no swelling, no deformity, No sign of DVT.    Neurological:  Alert and oriented to person, place, time, and situation.   Psychiatric:  Cooperative.      Medical Decision Making   Differential Diagnosis:  Congestive heart failure, chronic obstructive pulmonary disease.    Documents reviewed:  Emergency department nurses' notes.   Electrocardiogram:  Time 4/26/2021 08:16:00, rate 78, AV dual paced rhythm.    Results review:  Lab results : Lab View   4/26/2021 9:28 CDT       Est Creat Clearance Ser   42.15 mL/min    4/26/2021 8:55 CDT       COVID-19 Rapid            NEGATIVE    4/26/2021 8:44 CDT       Sodium Lvl                133 mmol/L  LOW                             Potassium Lvl             5.3 mmol/L  HI                             Chloride                  100 mmol/L                             CO2                       26 mmol/L                             Calcium Lvl               9.1 mg/dL                             Glucose Lvl               123 mg/dL  HI                             BUN                       28.9 mg/dL  HI                             Creatinine                1.76 mg/dL  HI                             eGFR-AA                   51  LOW                             eGFR-NGOC                  42 mL/min/1.73 m2  LOW                             Bili Total                1.2 mg/dL                             Bili Direct               0.6 mg/dL  HI                             Bili  Indirect             0.60 mg/dL                             AST                       29 unit/L                             ALT                       46 unit/L                             Alk Phos                  110 unit/L                             Total Protein             6.4 gm/dL                             Albumin Lvl               3.8 gm/dL                             Globulin                  2.6 gm/dL                             A/G Ratio                 1.5 ratio                             BNP                       4,914.3 pg/mL  HI                             Troponin-I                0.015 ng/mL                             PT                        22.6 second(s)  HI                             INR                       2.04  HI                             PTT                       33.2 second(s)                             WBC                       7.4 x10(3)/mcL                             RBC                       4.47 x10(6)/mcL  LOW                             Hgb                       11.3 gm/dL  LOW                             Hct                       37.8 %  LOW                             Platelet                  258 x10(3)/mcL                             MCV                       84.6 fL                             MCH                       25.3 pg  LOW                             MCHC                      29.9 gm/dL  LOW                             RDW                       17.2 %  HI                             MPV                       10.4 fL                             Abs Neut                  5.49 x10(3)/mcL                             Neutro Auto               75 %  NA                             Lymph Auto                12 %  NA                             Mono Auto                 10 %  NA                             Eos Auto                  2 %  NA                             Abs Eos                   0.2 x10(3)/mcL                             Basophil Auto             1 %   NA                             Abs Neutro                5.49 x10(3)/mcL                             Abs Lymph                 0.9 x10(3)/mcL                             Abs Mono                  0.7 x10(3)/mcL                             Abs Baso                  0.1 x10(3)/mcL                             IG%                       0 %  NA                             IG#                       0.020  NA  .   Radiology results:  Rad Results (ST)  < 12 hrs   Accession: [id number]  Order: XR Chest 1 View  Report Dt/Tm: 04/26/2021 09:34  Report:      CLINICAL:  Chest pain.     COMPARISON: December 9, 2020.     FINDINGS:  Cardiopericardial silhouette enlargement is similar. Left  chest implanted cardiac device electrodes terminate within the right  atrium and the right ventricle.  Lungs are without dense focal or  segmental consolidation, congestion, pleural effusion or pneumothorax.  Old deformity of the right clavicle.     IMPRESSION:     No acute cardiopulmonary process identified.         .      Reexamination/ Reevaluation   Vital signs   Basic Oxygen Information   4/26/2021 8:14 CDT       SpO2                      99 %                             Oxygen Therapy            Room air        Impression and Plan   Diagnosis   Acute exacerbation of CHF (congestive heart failure) ([id number] I50.9)      Calls-Consults   -  4/26/2021 11:09:00 , Urmila SOTELO, [facility], Case discussed, medicine team to evaluate.    Plan   Disposition: Admit time  4/26/2021 11:08:00, Place in Observation Telemetry Unit.

## 2022-04-30 NOTE — ED PROVIDER NOTES
Patient:   Nicole Garcia             MRN: [id number]            FIN: [id number]               Age:   61 years     Sex:  Male     :  1960   Associated Diagnoses:   Atrial fibrillation with rapid ventricular response; Implantable cardioverter-defibrillator (ICD) discharge; CHF (congestive heart failure)   Author:   Dash Bonilla MD      Basic Information   Time seen: Date & time 2021 21:10:00.   History source: Patient.   Arrival mode: Private vehicle.   History limitation: None.   Additional information: Chief Complaint from Nursing Triage Note : Chief Complaint   2021 21:07 CST     Chief Complaint           Reports defib fired while shooting pool 30m PTA. Denies pain/SOB  .      History of Present Illness   The patient presents with He is here for an apparent defibrillator discharge.  He has had a defibrillator in place for about 11 years, followed in the clinic here believes it has discharged once previously about 4 months ago.  At that time he was hospitalized overnight without reported complication.  He has been doing fine, taking his usual medications no presenting symptoms previously.  History otherwise includes coronary disease with angioplasty, hyperlipidemia, hypertension, SVT, congestive heart failure, tobacco use.  He drinks occasionally and has had by report a small amount of alcohol tonight but denies heavy drinking.  Denies drugs.  He was playing pool shortly before arrival when he had an episode of apparent defibrillator discharge, he describes a sudden heavy pressure across his entire abdomen and chest.  No loss of consciousness, palpitations nausea, vomiting, syncope or other complaints.  No residual pain after the episode.  He presented straight to the emergency room afterward.  No other complaints..        Review of Systems   Constitutional symptoms:  Negative except as documented in HPI, no fever, no chills, no weakness.    Skin symptoms:  Negative except as  documented in HPI, no rash, no breakdown.    Eye symptoms:  Negative except as documented in HPI, no recent vision problems, no pain.    ENMT symptoms:  Negative except as documented in HPI, no ear pain, no sore throat.    Respiratory symptoms:  Negative except as documented in HPI, no shortness of breath, no cough, no wheezing.    Cardiovascular symptoms:  Chest pain, See HPI, no palpitations, no syncope.    Gastrointestinal symptoms:  Negative except as documented in HPI, no abdominal pain, no nausea, no vomiting, no diarrhea.    Genitourinary symptoms:  Negative except as documented in HPI, no dysuria, no hematuria.    Musculoskeletal symptoms:  Negative except as documented in HPI, no Muscle pain, no Joint pain.    Neurologic symptoms:  Negative except as documented in HPI, no altered level of consciousness, no weakness.    Psychiatric symptoms:  Negative except as documented in HPI, no anxiety, no depression.    Endocrine symptoms:  Negative except as documented in HPI, no polyuria, no polydipsia.    Hematologic/Lymphatic symptoms:  Negative except as documented in HPI, bleeding tendency negative, bruising tendency negative.    Allergy/immunologic symptoms:  Negative except as documented in HPI, no recurrent infections, no impaired immunity.              Additional review of systems information: All other systems reviewed and otherwise negative, All systems reviewed as documented in chart.      Health Status   Allergies:    Allergic Reactions (Selected)  Severity Not Documented  Egg- No reactions were documented.  Influenza virus vaccine, inactivated- Allergy to eggs. and allergy to eggs..,    Allergies (2) Active Reaction  Egg None Documented  influenza virus vaccine, Allergy to eggs.  inactivated  .      Past Medical/ Family/ Social History   Medical history:    Resolved  Defibrillator, device ([id number]): Onset on 6/15/2011 at 50 years.  Resolved.  Defibrillator, device ([id number]): Onset on 6/15/2011 at  50 years.  Resolved..   Surgical history:    Incision & Drainage (Left, Neck) on 3/18/2016 at 55 Years.  Comments:  3/18/2016 11:34 ANAT - Antoni CHRISTENSEN, Suha TEJEDA  auto-populated from documented surgical case  Defibrillator, device ([id number]) on 6/15/2011 at 50 Years.  ICD - Internal cardiac defibrillator procedure ([id number])..   Family history:    Heart disease  Father (Brock Garcia, )  Mother (Bella Garcia, )  Brother  Cardiovascular disease  Brother (Reji Garcia, )  Primary malignant neoplasm of brain  Brother (Prieto Garcia, )  Acute myocardial infarction.  Brother (Tk Garcia, )  Cardiac arrest.  Brother (Tk Garcia, )  .   Social history:    Social & Psychosocial Habits    Alcohol  2015 Risk Assessment: Denies Alcohol Use    2021  Use: Past    Employment/School  2015  Status: Employed    Activity level: Moderate physical work    Highest education: High school    Hazardous equipment operation: No    Work hazards: Hazardous materials, Heavy lifting/twisting, Loud noises, Repetitive motion    Exercise  2015  Duration (average number of minutes): 48    Times per week: Daily    Exercise type: Walking    Comment: Does lots of walking at work. - 2015 10:43 - Luann Higgins RN    Home/Environment  2015  Lives with: Alone, Children, Significant other    Living situation: Home/Independent    Home equipment: CPAP/BiPAP    Alcohol abuse in household: No    Substance abuse in household: No    Smoker in household: Yes    Injuries/Abuse/Neglect in household: No    Feels unsafe at home: No    Safe place to go: Yes    Family/Friends available to help: Yes    Concern for family members at home: No    Major illness in household: No    Financial concerns: Yes    Concerns over TV/Computer/Game use: No    Nutrition/Health  2015  Type of diet: Coumadin.    Caffeine intake amount: 2-3 cokes 12 ounce daily.    Wants to lose weight: No     Sleeping concerns: No    Feels highly stressed: No    Sexual    Comment: Personal. - 03/11/2015 10:44 - Ronaldo CHRISTENSEN, Luann WOODSON    01/22/2019  What is your current gender identity? (Check all that apply) Identifies as male    Substance Use  03/11/2015 Risk Assessment: Denies Substance Abuse    04/30/2018  Use: Past    Tobacco  11/23/2021  Use: Former smoker, quit more    Patient Wants Consult For Cessation Counseling No    Abuse/Neglect  11/06/2021  SHX Any signs of abuse or neglect No    11/23/2021  SHX Any signs of abuse or neglect No    Feels unsafe at home: No    Safe place to go: Yes    Spiritual/Cultural  07/22/2021  Temple Preference Orthodox  .   Problem list:    Active Problems (9)  CAD S/P percutaneous coronary angioplasty   Gas exchange impairment   HLD - Hyperlipidemia   HTN (hypertension)   Obesity   SVT - Supraventricular tachycardia   Systolic heart failure   Tobacco user   VT (ventricular tachycardia)   .      Physical Examination               Vital Signs   Vital Signs   12/16/2021 21:07 CST     Temperature Oral          37.1 DegC                             Temperature Oral (calculated)             98.78 DegF                             Peripheral Pulse Rate     114 bpm  HI                             Respiratory Rate          20 br/min                             SpO2                      99 %                             Oxygen Therapy            Room air                             Systolic Blood Pressure   127 mmHg                             Diastolic Blood Pressure  64 mmHg  .      Vital Signs (last 24 hrs)_____  Last Charted___________  Temp Oral     37.1 DegC  (DEC 16 21:07)  Heart Rate Peripheral   H 114bpm  (DEC 16 21:07)  Resp Rate         20 br/min  (DEC 16 21:07)  SBP      127 mmHg  (DEC 16 21:07)  DBP      64 mmHg  (DEC 16 21:07)  SpO2      99 %  (DEC 16 21:07)  .   Measurements   12/16/2021 21:07 CST     Weight Dosing             75 kg                             Weight Measured and  Calculated in Lbs     165.34 lb                             Weight Estimated          75 kg  .   Basic Oxygen Information   12/16/2021 21:07 CST     SpO2                      99 %                             Oxygen Therapy            Room air  .   General:  Alert, no acute distress.    Skin:  Warm, dry, normal for ethnicity.    Head:  Normocephalic, atraumatic.    Neck:  Supple, trachea midline, no tenderness.    Eye:  Pupils are equal, round and reactive to light, extraocular movements are intact, normal conjunctiva.    Ears, nose, mouth and throat:  Oral mucosa moist, no pharyngeal erythema or exudate.    Cardiovascular:  Regular rate and rhythm, No murmur, Normal peripheral perfusion, No edema, He appears to be predominantly in sinus rhythm with relatively frequent ectopy versus possible pacer leads.  Wide-complex signal artifact make rhythm interpretation uncertain..    Respiratory:  Lungs are clear to auscultation, respirations are non-labored, breath sounds are equal, Symmetrical chest wall expansion.    Chest wall:  No tenderness, No deformity.    Back:  Nontender, Normal range of motion, Normal alignment.    Musculoskeletal:  Normal ROM, normal strength, no tenderness, no swelling, no deformity.    Gastrointestinal:  Soft, Nontender, Non distended, Normal bowel sounds.    Neurological:  Alert and oriented to person, place, time, and situation, No focal neurological deficit observed, CN II-XII intact, normal motor observed, normal speech observed.    Lymphatics:  No lymphadenopathy.   Psychiatric:  Cooperative, appropriate mood & affect, normal judgment.       Medical Decision Making   Documents reviewed:  Emergency department nurses' notes, emergency department records, prior records.    Orders  Launch Orders   Laboratory:  Troponin-I (Order): Stat collect, 12/16/2021 21:34 CST, Blood, Lab Collect, 12/16/2021 21:34 CST  CPK (Order): Stat collect, 12/16/2021 21:33 CST, Blood, Lab Collect, 12/16/2021 21:33  CST  CMP (Order): Stat collect, 12/16/2021 21:33 CST, Blood, Lab Collect, 12/16/2021 21:33 CST  CK MB (Order): Stat collect, 12/16/2021 21:33 CST, Blood, Lab Collect, 12/16/2021 21:33 CST  CBC w/ Auto Diff (Order): Stat collect, 12/16/2021 21:33 CST, Blood, Once, Stop date 12/16/2021 21:33 CST, Lab Collect, 12/16/2021 21:33 CST  BNP (Order): Stat collect, 12/16/2021 21:33 CST, Blood, Lab Collect, 12/16/2021 21:33 CST  Patient Care:  Interrogate defib - [facility] - felt discharge earlier tonight (Order): 12/16/2021 21:34 CST, Interrogate defib - [facility] - felt discharge earlier tonight  Saline Lock Insert (Order): 12/16/2021 21:34 CST  Pulse Oximetry (Order): 12/16/2021 21:34 CST  Cardiac Monitoring (Order): 12/16/2021 21:33 CST, Constant Order  Pharmacy:  aspirin 325 mg oral tablet (Order): 325 mg, form: Tab, Oral, Once, first dose 12/16/2021 21:33 CST, stop date 12/16/2021 21:33 CST, STAT, 4 chew tab = 5 grains  Radiology:  CXR 1 View (Order): Stat, 12/16/2021 21:34 CST, Dyspnea, None, Patient Bed, Patient Has IV?, Rad Type, Not Scheduled  Cardiology:  EKG (Order): 12/16/2021 21:34 CST, Stat, Patient Bed, Patient Has IV, Standard Precautions, NOW, -1, -1, 12/16/2021 21:34 CST, Launch Orders   Laboratory:  TSH (Order): Stat collect, 12/16/2021 22:11 CST, Blood, Lab Collect, 12/16/2021 22:11 CST, Launch Orders   Laboratory:  Urine Drug Screen [facility] (Order): Stat collect, Urine, 12/16/2021 22:12 CST, Nurse collect, 12/16/2021 22:12 CST  EtOH Level (Order): Stat collect, 12/16/2021 22:12 CST, Blood, Lab Collect, 12/16/2021 22:12 CST, Launch Orders   Consults:  [facility] ED Consult Internal Medicine (Order): 12/16/2021 22:46 CST, A. fib/ RVR/ ICD discharge.    Cardiac monitor:  Time 12/16/2021 22:06:00, Rate 105, Sinus Tachycardia, Premature Ventricular Contraction (PVC), See discussion above.    Electrocardiogram:  Time 12/16/2021 21:47:00, rate 104, EP Interp, Sinus tachycardia at 104 bpm, frequent PVCs old anteroseptal  infarct, slightly noisy baseline..    Results review:  Lab results : Lab View   12/16/2021 22:12 CST     Ethanol Lvl               43.0 mg/dL  NA    12/16/2021 21:46 CST     Sodium Lvl                136 mmol/L                             Potassium Lvl             3.5 mmol/L                             Chloride                  100 mmol/L                             CO2                       25 mmol/L                             Calcium Lvl               9.5 mg/dL                             Glucose Lvl               101 mg/dL                             BUN                       8.1 mg/dL  LOW                             Creatinine                0.91 mg/dL                             eGFR-AA                   >105                             eGFR-NGOC                  90 mL/min/1.73 m2                             Bili Total                0.5 mg/dL                             Bili Direct               0.2 mg/dL                             Bili Indirect             0.30 mg/dL                             AST                       16 unit/L                             ALT                       11 unit/L                             Alk Phos                  95 unit/L                             Total Protein             6.7 gm/dL                             Albumin Lvl               3.5 gm/dL                             Globulin                  3.2 gm/dL                             A/G Ratio                 1.1 ratio                             BNP                       1,057.6 pg/mL  HI                             Total CK                  68 U/L                             CK MB                     1.2 ng/mL                             Troponin-I                0.020 ng/mL                             WBC                       7.7 x10(3)/mcL                             RBC                       4.83 x10(6)/mcL                             Hgb                       13.4 gm/dL  LOW                             Hct                        40.9 %                             Platelet                  342 x10(3)/mcL                             MCV                       84.7 fL                             MCH                       27.7 pg                             MCHC                      32.8 gm/dL                             RDW                       13.2 %                             MPV                       8.7 fL                             Abs Neut                  5.77 x10(3)/mcL                             Neutro Auto               75 %  NA                             Lymph Auto                11 %  NA                             Mono Auto                 10 %  NA                             Eos Auto                  2 %  NA                             Abs Eos                   0.2 x10(3)/mcL                             Basophil Auto             1 %  NA                             Abs Neutro                5.77 x10(3)/mcL                             Abs Lymph                 0.9 x10(3)/mcL                             Abs Mono                  0.8 x10(3)/mcL                             Abs Baso                  0.0 x10(3)/mcL                             NRBC%                     0.0 %                             IG%                       0 %  NA                             IG#                       0.010  NA  .   Radiology results:  Rad Results (ST)  < 12 hrs   Accession: [id number]  Order: XR Chest 1 View  Report Dt/Tm: 12/16/2021 22:10  Report:   Chest one view     Clinical history is dyspnea     This is compared to a previous study from 11/6/2021     Lungs are clear. Pacing device is in place. Heart borderline in size.  Costophrenic angles are clear.     IMPRESSION: No acute disease is seen    .      Reexamination/ Reevaluation   Time: 12/16/2021 22:09:00 .   Notes: Device interrogated, discussed with the interpreting nurse.  He had significant runs of atrial fibrillation with rapid ventricular response, rates in the 170s  to above 200, ultimately was shocked at about 7:30 this evening for persistent A. fib with RVR, successful conversion to sinus rhythm.  No other findings. Printed report reviewed..   Time: 12/16/2021 22:45:00 .   Vital signs   results included from flowsheet : Vital Signs   12/16/2021 22:29 CST     Peripheral Pulse Rate     101 bpm  HI                             Respiratory Rate          17 br/min                             SpO2                      95 %                             Oxygen Therapy            Room air                             Systolic Blood Pressure   108 mmHg                             Diastolic Blood Pressure  68 mmHg                             Mean Arterial Pressure, Cuff              81 mmHg    12/16/2021 22:08 CST     Peripheral Pulse Rate     99 bpm                             Respiratory Rate          21 br/min                             SpO2                      97 %                             Oxygen Therapy            Room air     Notes: Stable; discussed with Int. Med. - to see in ER.      Impression and Plan   Diagnosis   Atrial fibrillation with rapid ventricular response (KKB78-EB I48.91)   Implantable cardioverter-defibrillator (ICD) discharge (UGN31-XM Z45.02)   CHF (congestive heart failure) (LWB20-AB I50.9)      Calls-Consults   -  12/16/2021 23:00:00 , Int. Med. - to see and admit.    Plan   Disposition: Admit time  12/16/2021 23:14:00, Place in Observation Telemetry Unit, Int. Med. .

## 2022-05-03 NOTE — HISTORICAL [facility]ER
This is a historical note converted from [employer]. Formatting and pictures may have been removed.  Please reference [place] for original formatting and attached multimedia. Chief Complaint  4 month follow up. Patient was hospitalized for malfunctioning deflibrillator  History of Present Illness  This is a 61 year old male with a past medical history of coronary artery disease s/p high risk PCI?in May 2021,?hyperlipidemia, hypertension,?HFrEF (EF 10%) who presents to Cardiology clinic today for follow up visit. Patient was recently admitted on 11/6 after ICD shock. At this time, his metoprolol succinate 25 daily was increased to 50 mg in the AM and 25 mg at night. He is also on Entresto 24-26, Bumex 2 mg BID, Spirnoloactone 12.5 mg BID, plavix and xarelto. He reports good compliance with medications. He states that he has been feeling well since his last visit. Denies palpitations, orthopnea, PND, peripheral edema, syncope, dizziness, lightheadedness.?  Review of Systems  10 point review of systems unremarkable except as listed above  Physical Exam  Vitals & Measurements  T:?37.1? ?C (Oral)? HR:?103(Peripheral)? BP:?97/41? SpO2:?100%?  HT:?165.10?cm? WT:?73.600?kg? BMI:?27?  General: Patient resting comfortably in bed, in no acute distress?  Eye: PERRLA, EOMI, clear conjunctiva, eyelids normal  HENT: Head-normocephalic and atraumatic, dental prosthetics?in place  Respiratory: clear to auscultation bilaterally without wheezes, rales, rhonchi  Cardiovascular: regular rate and rhythm without murmurs. ?No gallops or rubs no JVD. ?Capillary refill within normal limits.? No edema,?pulses 2+ in all 4 extremities  Gastrointestinal: soft, non-tender, non-distended with normal bowel sounds, without masses to palpation  Integumentary: no rashes or skin lesions present, scar over top?left superior anterior thorax  Neurologic: no signs of peripheral neurological deficit, motor/sensory function intact  Psychiatric: ?alert and  oriented, cognitive function intact, cooperative with exam, good eye contact, judgement and insight intact, mood and affect full range  Assessment/Plan  CHF/Ischemic cardiomyopathy - NYHA class II-III (EF?10%?as of?12/2020)  s/p AICD implant  -Underwent?high risk PCI on 5/12/2021 at [facility] in [place]  -Denies shortness of breath at presents  -Appears euvolemic on exam today  -Continue GDMT with Entresto?24-26 mg twice daily,Bumex?2 mg twice daily, and Spironolactone 12.5 mg?daily  -increasing dose of Metoprolol to 50 mg BID  -Counseled on low salt diet - max 2g daily  -Continue routine ICD interrogations  -Ordered repeat echocardiogram for?September 2021  ?   CAD  -S/p?coronary stent placement?at [facility] in [facility] on 5.7.21  -Continue Plavix, Entresto, and Metoprolol Succinate  -Instructed patient to stop?aspirin at this time?and continue with?Plavix and Xarelto?as has been over a month since his high risk PCI.  -Counseled on heart healthy diet  ?   Atrial fibrillation  -Denies palpitations  -Continue Xarelto  -increase metoprolol to 50 mg BID  ?   Hypertension  -BP?110/76 today  -Continue current medications  ?   Hyperlipidemia  -Patient discontinued on atorvastatin at last visit due to elevated LFTs  ?   Tobacco Use  -Counseled on continued smoking cessation - states he has not smoked since he is high risk PCI  ?   -EKG today showing multiple PVCs  -increasing metoprolol to 50 mg BID  -BMP and magnesium level ordered for today  -Follow up in Cardiology clinic in 3 months  ?  ?  Allergy attending addendum  Patient?seen and examined with Dr. Zainab Hutchison. ?Agree with note as outlined above.? Patient was recently admitted to the hospital after 2 ICD shocks.? During the admission metoprolol was increased to 50?mg in the morning and 25 mg in the evening. ?Todays EKG continues to show?high PVC burden.? Will increase metoprolol to 50 mg twice daily. ?We will also repeat?electrolytes?today.   Problem List/Past Medical  History  Ongoing  CAD S/P percutaneous coronary angioplasty  HLD - Hyperlipidemia  HTN (hypertension)  Obesity  SVT - Supraventricular tachycardia  Systolic heart failure  VT (ventricular tachycardia)  Historical  Defibrillator, device  Defibrillator, device  Procedure/Surgical History  Catheter placement in coronary artery(s) for coronary angiography, including intraprocedural injection(s) for coronary angiography, imaging supervision and interpretation; with left heart catheterization including intraprocedural injection(s) for left fernando (02/08/2021)  Fluoroscopy of Left Heart using Low Osmolar Contrast (02/08/2021)  Fluoroscopy of Multiple Coronary Arteries using Low Osmolar Contrast (02/08/2021)  Intravascular Doppler velocity and/or pressure derived coronary flow reserve measurement (coronary vessel or graft) during coronary angiography including pharmacologically induced stress; initial vessel (List separately in addition to code for primary pro (02/08/2021)  Measurement of Arterial Pressure, Coronary, Percutaneous Approach (02/08/2021)  Measurement of Arterial Pressure, Coronary, Percutaneous Approach (02/08/2021)  Measurement of Cardiac Sampling and Pressure, Left Heart, Percutaneous Approach (02/08/2021)  Insertion of Defibrillator Generator into Chest Subcutaneous Tissue and Fascia, Open Approach (09/12/2018)  Removal of Cardiac Rhythm Related Device from Trunk Subcutaneous Tissue and Fascia, Open Approach (09/12/2018)  Removal of implantable defibrillator pulse generator with replacement of implantable defibrillator pulse generator; dual lead system (09/12/2018)  Catheter placement in coronary artery(s) for coronary angiography, including intraprocedural injection(s) for coronary angiography, imaging supervision and interpretation; with left heart catheterization including intraprocedural injection(s) for left fernando (08/29/2018)  Fluoroscopy of Left Heart using Low Osmolar Contrast (08/29/2018)  Fluoroscopy  of Multiple Coronary Arteries using Low Osmolar Contrast (08/29/2018)  Measurement of Cardiac Sampling and Pressure, Left Heart, Percutaneous Approach (08/29/2018)  Drainage of Tonsils with Drainage Device, Open Approach (03/18/2016)  Incision & Drainage (Left, Neck) (03/18/2016)  Inspection of Neck, Open Approach (03/18/2016)  Defibrillator, device (06/15/2011)  ICD - Internal cardiac defibrillator procedure   Medications  Albuterol (Eqv-ProAir HFA) 90 mcg/inh inhalation aerosol, See Instructions  bumetanide 2 mg oral tablet, 2 mg= 1 tab(s), Oral, BID, 2 refills  clopidogrel 75 mg oral tablet, 75 mg= 1 tab(s), Oral, Daily, 11 refills  Entresto 24 mg-26 mg oral tablet, See Instructions  levothyroxine 75 mcg (0.075 mg) oral tablet, 0.038 mg= 0.5 tab(s), Oral, Daily  metoprolol succinate 25 mg oral tablet, extended release, See Instructions, 2 refills  potassium chloride 10 mEq oral tablet, extended release, 20 mEq= 2 tab(s), Oral, BID, 3 refills  spironolactone 25 mg oral tablet, See Instructions, 11 refills  Vitamin D, 1 tab, Oral, Daily  Xarelto 20mg Tablet, 20 mg= 1 tab(s), Oral, Daily, 11 refills  Allergies  Egg  influenza virus vaccine, inactivated?(Allergy to eggs., Allergy to eggs.)  Social History  Abuse/Neglect  No, No, Yes, 11/23/2021  No, 11/06/2021  Alcohol - Denies Alcohol Use, 03/11/2015  Past, 05/17/2021  Employment/School  Employed, Activity level: Moderate physical work. Highest education level: High school. Operates hazardous equipment: No. Workplace hazards: Hazardous materials, Heavy lifting/twisting, Loud noises, Repetitive motion., 03/11/2015  Exercise  Exercise duration: 48. Exercise frequency: Daily. Exercise type: Walking., 03/11/2015  Home/Environment  Lives with Alone, Children, Significant other. Living situation: Home/Independent. Home equipment: CPAP/BiPAP. Alcohol abuse in household: No. Substance abuse in household: No. Smoker in household: Yes. Injuries/Abuse/Neglect in household: No.  Feels unsafe at home: No. Safe place to go: Yes. Family/Friends available for support: Yes. Concern for family members at home: No. Major illness in household: No. Financial concerns: Yes. TV/Computer concerns: No., 03/11/2015  Nutrition/Health  Coumadin., Caffeine intake amount: 2-3 cokes 12 ounce daily.. Wants to lose weight: No. Sleeping concerns: No. Feels highly stressed: No., 03/11/2015  Sexual  Gender Identity Identifies as male., 01/22/2019  Spiritual/Cultural  Zoroastrian, 07/22/2021  Substance Use - Denies Substance Abuse, 03/11/2015  Past, 04/30/2018  Tobacco  Former smoker, quit more than 30 days ago, No, 11/23/2021  Family History  Acute myocardial infarction.: Brother.  Cardiac arrest.: Brother.  Cardiovascular disease: Brother.  Heart disease: Mother, Father and Brother.  Primary malignant neoplasm of brain: Brother.  Immunizations  Vaccine Date Status   pneumococcal vacc 10/29/2010 Recorded   Health Maintenance  Health Maintenance  ???Pending?(in the next year)  ??? ??OverDue  ??? ? ? ?HF-ACEI/ARB Prescribed if Clinically Indicated due??09/16/20??and every 1??year(s)  ??? ??Due?  ??? ? ? ?COPD Maintenance-Spirometry due??11/23/21??Unknown Frequency  ??? ? ? ?HF-Ejection Fraction Past 13 Months if Hospitalized due??11/23/21??and every 1??year(s)  ??? ? ? ?HF-Fluid Restriction/Low Sodium Diet Education due??11/23/21??and every 1??year(s)  ??? ? ? ?Lung Cancer Screening due??11/23/21??and every 1??year(s)  ??? ? ? ?Tetanus Vaccine due??11/23/21??and every 10??year(s)  ??? ? ? ?Zoster Vaccine due??11/23/21??Unknown Frequency  ??? ??Due In Future?  ??? ? ? ?Obesity Screening not due until??01/01/22??and every 1??year(s)  ??? ? ? ?Smoking Cessation not due until??01/01/22??and every 1??year(s)  ??? ? ? ?Alcohol Misuse Screening not due until??01/02/22??and every 1??year(s)  ??? ? ? ?Functional Assessment not due until??01/02/22??and every 1??year(s)  ??? ? ? ?HF-LVEF not due until??05/04/22??and every  1??year(s)  ??? ? ? ?Aspirin Therapy for CVD Prevention not due until??05/07/22??and every 1??year(s)  ??? ? ? ?ADL Screening not due until??05/17/22??and every 1??year(s)  ??? ? ? ?Colorectal Screening not due until??05/18/22??and every 1??year(s)  ??? ? ? ?Depression Screening not due until??07/22/22??and every 1??year(s)  ??? ? ? ?Diabetes Screening not due until??11/09/22??and every 1??year(s)  ??? ? ? ?HF-Heart Failure Education not due until??11/09/22??and every 1??year(s)  ??? ? ? ?Hypertension Management-BMP not due until??11/09/22??and every 1??year(s)  ??? ? ? ?Coronary Artery Disease Maintenance-BMP not due until??11/09/22??and every 1??year(s)  ??? ? ? ?Coronary Artery Disease Maintenance-Electrocardiogram not due until??11/09/22??and every 1??year(s)  ???Satisfied?(in the past 1 year)  ??? ??Satisfied?  ??? ? ? ?ADL Screening on??05/17/21.??Satisfied by Gosia Parker LPN  ??? ? ? ?Alcohol Misuse Screening on??05/17/21.??Satisfied by Bhumika Orozco  ??? ? ? ?Aspirin Therapy for CVD Prevention on??05/07/21.??Satisfied by Shalini Rivera RN  ??? ? ? ?Blood Pressure Screening on??11/23/21.??Satisfied by Gila Cooley  ??? ? ? ?Body Mass Index Check on??11/23/21.??Satisfied by Gila Cooley  ??? ? ? ?COPD Maintenance-Spirometry on??11/07/21.??Satisfied by Berna Echols LPN  ??? ? ? ?Colorectal Screening on??05/18/21.??Satisfied by Carolina Calvillo  ??? ? ? ?Coronary Artery Disease Maintenance-Electrocardiogram on??11/09/21.  ??? ? ? ?Depression Screening on??07/22/21.??Satisfied by Anne Landa LPN  ??? ? ? ?Diabetes Screening on??11/09/21.??Satisfied by Erika Montero  ??? ? ? ?Functional Assessment on??11/07/21.??Satisfied by Emil PT, [place]  ??? ? ? ?HF-Beta Blocker Prescribed if Clinically Indicated on??11/09/21.??Satisfied by Sam Rosa DO  ??? ? ? ?Hypertension Management-Blood Pressure on??11/23/21.??Satisfied by Gila Cooley  ??? ? ? ?Influenza  Vaccine on??11/23/21.??Satisfied by Gila Cooley  ??? ? ? ?Lipid Screening on??11/07/21.??Satisfied by Mikala Vasquez  ??? ? ? ?Obesity Screening on??11/23/21.??Satisfied by Gila Cooley  ??? ? ? ?Smoking Cessation on??05/17/21.??Satisfied by Bhumika Orozco  ?

## 2022-05-03 NOTE — HISTORICAL [facility]ER
This is a historical note converted from Marcin. Formatting and pictures may have been removed.  Please reference Marcin for original formatting and attached multimedia. Chief Complaint  Pt AAOX3. States he was lyiing down when his defibrillator went off. Denies chest pain as well as SOB at this time.  History of Present Illness  This is a?60-year-old  male with a past medical history of?coronary artery disease s/p high risk PCI?in May 2021,?hyperlipidemia, hypertension,?HFrEF?who presents?because?his cardiac defibrillator?shocked him. ?Patient was at home eating and watching TV, he denies any extraneous activities, and he denies any symptoms during this time (such as chest pain)?when his?defibrillator shocked him. He immediately jumped up from sitting down?and then went right back to sitting down. ?He denied any loss of consciousness, denied hitting his head,?and?denies incontinence.  On interrogation of his device patient had a 1 minute and 6-second run of sustained ventricle tachycardia?with?heart rate of 190 bpm,?electrical shock was then administered?and HR returned to?baseline.? On?review of his records patient has been having?6 to 9 seconds?runs of unsustained?ventricular tachycardia without?shocks for the past 2 months.  ?  Of note, patient was admitted at [facility] in May 2021, for his acute CHF exacerbation.? Patient required?ICU level care?and was eventually?transferred to?[place] for a?high risk PCI.? At [facility] in [place] patient received a stent. ?Patient has a history of supraventricular tachycardia, atrial fibrillation, which he received a?defibrillator s/p cardioversion?in 2010.? Patient had a generator change September 2018.? Patient has a history of heart failure with reduced ejection fraction which has?been steadily declining since 2018 with a 30 to 35%?ejection fraction in 2018 to 10% ejection fraction?in May 2021. He reports an extensive family history of cardiac disease.  ?  Past  medical history:?  CAD s/p high-risk PCI - 2021  HFrEF (LVEF 10%) s/p ICD implant in  w/ gen change in   SVT  AFib  ?  Past surgical history:  High risk PCI?May 2021  ICD implantation in ,?s/p generator change 2018.  ?  Family medical history:?  Father (Brock,?): Heart disease  Mother (Bella,?): Heart disease  Brother: Heart disease  Brother (Reji,?): Cardiovascular disease, HFrEF s/p transplant  Brother (Tk Garcia,?): Acute myocardial infarction.; Cardiac arrest.  Brother (Prieto,?): Primary malignant neoplasm of brain  ?   Social history:?  Previously worked [employer] in [place], currently his not working  Lives at home with?fiance and daughter  Tobacco:??44 pack year history of smoking - quit smoking after PCI in May  ETOH: 20-year history daily drink; no longer drinks  ?   EKG?shows?PVCs, T wave versions in V4 to V6. ?Poor R wave progression, inverted P waves (cannot rule out left atrial enlargement) no ST elevations or depressions noted.  Patient is being admitted for observation.  Review of Systems  Constitutional:?No nausea, vomiting, headache and fever  Eye:?No vision loss, blurred vision, or changes in vision  Respiratory:???No cough, shortness of breath,?  Cardiovascular:?No chest pain, palpitations,?  Gastrointestinal:?No changes in bowel habits; no constipation, and diarrhea, no bloody or black tarry stools  Genitourinary:?No pain on urination, increased urgency, blood in urine  Musculoskeletal:?No muscle or joint pain, no joint swelling  Integumentary:?No skin rash or abnormal lesion  Physical Exam  Vitals & Measurements  T:?37.3? ?C (Oral)? HR:?91(Peripheral)? RR:?16? BP:?113/104? SpO2:?99%? WT:?74?kg? BMI:?27.18?  General: Patient is not in any respiratory distress  Eye:?Extraocular movements are intact, Normal conjunctiva, Pupils equal and reactive to light  HENT: Normal hearing, Oral mucosa is moist,  Neck: Supple,  Non-tender, No jugular venous distention  Respiratory:?Respirations are non-labored, Lungs are clear to auscultation bilaterally, Breath sounds are equal with no crackles, and +wheezes bilaterally  Cardiovascular:?Regular rate and rhythm, S1, S2, No murmur, No added heart sounds, Normal peripheral perfusion, no pedal edema  Gastrointestinal:?Soft, Non-tender,?  Musculoskeletal:?No tenderness, no swelling?  Neurologic:Alert, Oriented, No gross focal deficits, Cranial Nerves II-XII are grossly intact.  Psychiatric:?Cooperative, Appropriate mood & affect  Integumentary:??No lesions noted on inspection  Assessment/Plan  Ventricular Tachycardia s/p ICD?Shock on 11/6/2021  ?? -?reported on admission  ?? -?etiology possibly multifactorial including inappropriate levothyroxine dose and electrolytes not at goal  ?? -?will obtain formal device interrogation by?[facility] to further evaluate  ?? - will need further evaluation of thyroid disease and levothyroxine dose  ?? - continue close monitoring on telemetry  ?   HFrEF?(10%?May 2021) s/p ICD Shock on 11/6/2021  ???-?HFrEF due to ischemic cardiomyopathy  ???- HF Status: NYHA Class III, ACC/AHA Stage?C  ???- LVEF: 10% (TTE on 5/3/2021)  ? ?- GDMT:?Entresto 25-26 BID, Aldactone 25mg donis, metoprolol succinate 25mg daily  ???- Diuresis:?Bumex 2mg BID PO (home dose)?  ???- Cardiac Device(s):?Dual Chamber ICD - [facility] (Implanted 9/6/2018)  ???- Maintain K 4, Phos 3, Mg 2  ???- Strict Is/Os and daily weights  ?   CAD s/p PCI in May 2021  ?? -?High-intensity statin discontinued previously?2/2 elevated liver enzymes - will?reevaluate?need  ?? -?continue?Plavix  ?   History of Atrial Fibrillation  ?? -?currently in sinus rhythm?  ?? - [id number] Score:?3  ?? - HAS-BLED Score:?2  ?? - Rate/Rhythm Control:?Metoprolol 25mg daily ?  ???- Anticoagulation:?Xarelto  ?? - Followed by?[facility]?- Last seen 7/20/2021  ?   HTN  ?? -?/60 on admission?  ?? -  continue?Entresto, Metoprolol, and?Aldactone as outlined above?  ?   History of Tobacco Use with Physical Exam Findings Concerning for COPD  ?? - 44 pk-yr history  ?? - noted to have inspiratory wheezes on exam?  ?? - will initiate DuoNebs while inpatient  ?? - will need formal PFTs outpatient?  ?   HLD  ?? - 10-year ASCVD Risk?6%  ?? - LDL 94 (11/6/2021  ?? - statin previously discontinued due to elevated liver enzymes  ?  ?  ?  DVT PPx: Xarelto 20 mg  GI PPx: Protonix  IVF: None  Diet: Cardiac  Antibiotics: None  Disp: This is a 60-year-old  male who is being admitted s/p?ICD shock?for a 1 minute and 6-second run?of sustained ventricular tachycardia?earlier today. ?Patient is being admitted for observation in telemetry. ?Will escalate care if patient requires.  ?  ?   Problem List/Past Medical History  Ongoing  CAD S/P percutaneous coronary angioplasty  HLD - Hyperlipidemia  HTN (hypertension)  Obesity  SVT - Supraventricular tachycardia  Systolic heart failure  VT (ventricular tachycardia)  Historical  Defibrillator, device  Defibrillator, device  Procedure/Surgical History  Catheter placement in coronary artery(s) for coronary angiography, including intraprocedural injection(s) for coronary angiography, imaging supervision and interpretation; with left heart catheterization including intraprocedural injection(s) for left fernando (02/08/2021)  Fluoroscopy of Left Heart using Low Osmolar Contrast (02/08/2021)  Fluoroscopy of Multiple Coronary Arteries using Low Osmolar Contrast (02/08/2021)  Intravascular Doppler velocity and/or pressure derived coronary flow reserve measurement (coronary vessel or graft) during coronary angiography including pharmacologically induced stress; initial vessel (List separately in addition to code for primary pro (02/08/2021)  Measurement of Arterial Pressure, Coronary, Percutaneous Approach (02/08/2021)  Measurement of Arterial Pressure, Coronary, Percutaneous Approach  (02/08/2021)  Measurement of Cardiac Sampling and Pressure, Left Heart, Percutaneous Approach (02/08/2021)  Insertion of Defibrillator Generator into Chest Subcutaneous Tissue and Fascia, Open Approach (09/12/2018)  Removal of Cardiac Rhythm Related Device from Trunk Subcutaneous Tissue and Fascia, Open Approach (09/12/2018)  Removal of implantable defibrillator pulse generator with replacement of implantable defibrillator pulse generator; dual lead system (09/12/2018)  Catheter placement in coronary artery(s) for coronary angiography, including intraprocedural injection(s) for coronary angiography, imaging supervision and interpretation; with left heart catheterization including intraprocedural injection(s) for left fernando (08/29/2018)  Fluoroscopy of Left Heart using Low Osmolar Contrast (08/29/2018)  Fluoroscopy of Multiple Coronary Arteries using Low Osmolar Contrast (08/29/2018)  Measurement of Cardiac Sampling and Pressure, Left Heart, Percutaneous Approach (08/29/2018)  Drainage of Tonsils with Drainage Device, Open Approach (03/18/2016)  Incision & Drainage (Left, Neck) (03/18/2016)  Inspection of Neck, Open Approach (03/18/2016)  Defibrillator, device (06/15/2011)  ICD - Internal cardiac defibrillator procedure   Medications  Inpatient  No active inpatient medications  Home  Albuterol (Eqv-ProAir HFA) 90 mcg/inh inhalation aerosol, See Instructions  bumetanide 2 mg oral tablet, 2 mg= 1 tab(s), Oral, BID  clopidogrel 75 mg oral tablet, 75 mg= 1 tab(s), Oral, Daily, 11 refills  Entresto 24 mg-26 mg oral tablet, See Instructions  levothyroxine 50 mcg (0.05 mg) oral tablet, 50 mcg= 1 tab(s), Oral, Daily, 1 refills  metoprolol succinate 25 mg oral tablet, extended release, 25 mg= 1 tab(s), Oral, Daily, 11 refills  potassium chloride 10 mEq oral tablet, extended release, 10 mEq= 1 tab(s), Oral, BID, 3 refills  spironolactone 25 mg oral tablet, See Instructions, 11 refills  Vitamin D, 1 tab, Oral, Daily  Xarelto 20mg  Tablet, 20 mg= 1 tab(s), Oral, Daily, 11 refills  Allergies  Egg  influenza virus vaccine, inactivated?(Allergy to eggs., Allergy to eggs.)  Social History  Abuse/Neglect  No, 11/06/2021  No, No, Yes, 05/17/2021  Alcohol - Denies Alcohol Use, 03/11/2015  Past, 05/17/2021  Employment/School  Employed, Activity level: Moderate physical work. Highest education level: High school. Operates hazardous equipment: No. Workplace hazards: Hazardous materials, Heavy lifting/twisting, Loud noises, Repetitive motion., 03/11/2015  Exercise  Self assessment: Good condition., 03/24/2015  Exercise duration: 48. Exercise frequency: Daily. Exercise type: Walking., 03/11/2015  Home/Environment  Lives with Alone, Children, Significant other. Living situation: Home/Independent. Home equipment: CPAP/BiPAP. Alcohol abuse in household: No. Substance abuse in household: No. Smoker in household: Yes. Injuries/Abuse/Neglect in household: No. Feels unsafe at home: No. Safe place to go: Yes. Family/Friends available for support: Yes. Concern for family members at home: No. Major illness in household: No. Financial concerns: Yes. TV/Computer concerns: No., 03/11/2015  Nutrition/Health  Coumadin., Caffeine intake amount: 2-3 cokes 12 ounce daily.. Wants to lose weight: No. Sleeping concerns: No. Feels highly stressed: No., 03/11/2015  Sexual  Gender Identity Identifies as male., 01/22/2019  Spiritual/Cultural  Congregational, 07/22/2021  Substance Use - Denies Substance Abuse, 03/11/2015  Past, 04/30/2018  Tobacco  Former smoker, quit more than 30 days ago, No, 11/06/2021  Former smoker, quit more than 30 days ago, Cigarettes, N/A, 07/22/2021  4 or less cigarettes(less than 1/4 pack)/day in last 30 days, No, 05/17/2021  Family History  Acute myocardial infarction.: Brother.  Cardiac arrest.: Brother.  Cardiovascular disease: Brother.  Heart disease: Mother, Father and Brother.  Primary malignant neoplasm of brain: Brother.  Immunizations  Vaccine Date  Status   pneumococcal vacc 10/29/2010 Recorded       Patient was seen and examined with the primary team this morning.  I have made adjustments to the above note to reflect my changes.  ?  There is no need for a statin in this patient as there is no proven benefit of statin therapy and HFrEF.  We will reduce his levothyroxine dose?and he will likely need?reevaluation of appropriateness of therapy in 4 to 6 weeks.  Continue all GDMT.? He will need to have a?complete?device interrogation?prior to discharge.? He will likely benefit from a cardiology?consult tomorrow morning.  Continue close monitoring and care.

## 2022-05-03 NOTE — HISTORICAL [facility] CERNER
This is a historical note converted from Marcin. Formatting and pictures may have been removed.  Please reference [place] for original formatting and attached multimedia. Admit and Discharge Dates  Admit Date: 04/26/2021  Discharge Date: 04/28/2021  Physicians  Attending Physician - Theo SOTELO, Travis BARTLETT  Admitting Physician - Theo SOTELO, Travis BARTLETT  Primary Care Physician - No PCP, No  Discharge Diagnosis  Acute exacerbation of CHF (congestive heart failure)?I50.9  Shortness of breath?[id number]  Surgical Procedures  No procedures recorded for this visit.  Immunizations  No immunizations recorded for this visit.  Admission Information  60-year-old??male with a?PMH?of nonischemic cardiomyopathy, heart failure reduced ejection fraction of 10% with AICD placed, history of V. tach, SVT, A. fib on Coumadin and Amiodarone. ?He complained of 1 week?hx?of shortness of breath and non-productive cough that has gotten progressively worse. ?He endorses orthopnea, cannot lay flat due to shortness of breath.?He?endorses that?sitting up relieves his symptoms. ?He also complains of lower leg extremity that has occurred since last Tuesday. ?He complains of back pain which is chronic.??Patient?denies COVID-19 exposure stating that he never leaves his house. ?Of note,??he was referred to [place] for CABG evaluation. ?Given his low ejection fraction which is believed to be disproportionate to coronary artery disease, the patient is not considered a surgical candidate at this time. ?The decision was made to send to [place] for protected PCI with [facility] support page. ?Patient stated that he ended up not doing PCI procedure because he worried?about not surviving?the PCI procedure.??He is currently on maximum tolerated medical therapy with Coreg 25 mg, Entresto 24/26 mg, Aldactone 25 mg, Lasix 20 mg and aspirin 81 mg daily. ?He is also on Coumadin for his A. fib. ?His last interrogation of AICD on  2/21 was normal with no recent events. He continues to smoke half pack of cigarettes per day and has not decided yet to quit. ?He denies drinking alcohol recently stated that he quit 10 years ago. ?He denies illicit substance use.  Hospital Course  He was managed with Lasix, Aldactone, Entresto, Coreg, Amiodarone, and Warfarin. Fluid repleted for SANJAY. He was able to tolerate diet and?ambulate without oxygen requirement. Clinically, he did not seem to be?volume overloaded since day 2 of hospitalization. It seems more like he was volume depleted hence fluids were given. He has a dry cough that has been persistent. Otherwise, he was able to tolerate his home medications. He was having net negative?195 cc balance in the past 24 hours. He lost about 3 kg of weight during his hospitalization based on results review.  Significant Findings  CT abdomen 12/9/2020: Nonspecific trace ascites along the right paracolic gutter. Nonspecific 5 mm solid left lower lobe nodule. If the patient is at increased risk for lung malignancy, and no remote CT scans of the chest or abdomen are available to document stability, a one year followup chest CT can be considered for surveillance purposes. If there is no increased risk for lung malignancy, no further followup is necessary for this nodule.  X-ray chest 4/26/21: No acute cardiopulmonary process identified  EKG 4/26/21: AV dual place rhythm. ?Prolonged?QT elongation ?ms  Abdominal US 4/28/21: 1. Normal size and parenchymal echotexture of the liver. No suspicious hepatic mass lesions or intrahepatic/extrahepatic biliary ductal  dilatation. Patent main portal vein. No sonographic evidence of biliary sludge or cholelithiasis. No sonographic findings to suggest acute cholecystitis. Unremarkable sonographic evaluation of the bilateral kidneys. No suspicious renal mass lesions, nephrolithiasis, or hydronephrosis. Unremarkable sonographic evaluation of the spleen and visualized pancreas.  Small volume perihepatic and perisplenic ascites.  Time Spent on discharge  > 1 hour  Objective  Vitals & Measurements  T:?36.7? ?C (Oral)? TMIN:?36.4? ?C (Oral)? TMAX:?36.8? ?C (Oral)? HR:?81(Peripheral)? RR:?18? BP:?104/77? SpO2:?97%?  Physical Exam  General: Well-appearing, not in acute distress, + dry cough  HEENT: EOMI, dry oral mucosa  Neck: no masses, no thyroid size/masses  Heart: RRR, no murmurs, no rubs, no gallops  Lungs: regular chest symmetry with respirations, no?wheezes, no?crackles  Abdomen: regular shape, no scars, normal bowel sounds, soft, no tenderness, no masses, no cva tenderness  MSK: no muscle atrophy, no?weakness, normal joint range of motion, no swelling  Neurologic: No focal neurological deficits  Extremities: No edema  Patient Discharge Condition  Clinically stable  Discharge Disposition  CHF:?Patient will follow-up with his cardiologist in 2 weeks  Medical Management:?Patient will follow-up in [facility]?on?5/5/2021?at?0100  Patient will be referred to [facility] as a new patient  Nonspecific 5 mm solid left lower lobe nodule noted on CT abdomen on 12/9/2020. If the patient is at increased risk for lung malignancy, and no remote CT scans of the  chest or abdomen are available to document stability, a one year followup chest CT can be considered for surveillance purposes.?   Discharge Medication Reconciliation  Continue  albuterol (Albuterol (Eqv-ProAir HFA) 90 mcg/inh inhalation aerosol)?See Instructions  amiodarone (amiodarone 200 mg oral Tab)?See Instructions  aspirin (aspirin 81 mg oral tablet)?81 mg, Oral, Daily  atorvastatin (atorvastatin 80 mg oral tablet)?80 mg, Oral, Daily  carvedilol (carvedilol 25 mg oral tablet)?See Instructions  ergocalciferol (Vitamin D)?1 tab, Oral, Daily  furosemide (Lasix 20 mg oral tablet)?20 mg, Oral, Daily  potassium chloride (potassium chloride 10 mEq oral tablet, extended release)?10 mEq, Oral, BID  sacubitril-valsartan (Entresto 24 mg-26 mg  oral tablet)?1 tab(s), Oral, BID  spironolactone (spironolactone 25 mg oral tablet)?25 mg, Oral, Daily  warfarin (warfarin 1 mg oral tablet)?1 mg, Oral, At Bedtime  Discontinue  warfarin (warfarin 2 mg oral tablet)?2 mg, Oral, At Bedtime  warfarin (warfarin 2 mg oral tablet)?2 mg, Oral, At Bedtime  warfarin (warfarin 2 mg oral tablet)?2 mg, Oral, At Bedtime  Education and Orders Provided  Heart Failure  Discharge - 04/28/21 6:47:00 CDT, Home?  Follow up  Salty Crespo on 05/05/2021  Report to Emergency Department if symptoms return or worsen

## 2022-05-03 NOTE — HISTORICAL [facility]ER
This is a historical note converted from [place]. Formatting and pictures may have been removed.  Please reference [place] for original formatting and attached multimedia. Chief Complaint  establish care  History of Present Illness  59 yo male here today to establish care. PMH HTN,HLD Chronic HF (EF 10%), 2 vessel CAD, hx VT, AFib & tobacco use (1 ppd).??Followed by [facility] (LOV 05/07/21, NOV 05/21/21).  Recent BLU inpatient status for PCI with stent.  ?  Prostate Cancer Screening?-?05/17/21 PSA Ordered  Cancer Screening -? 05/17/21 FIT Ordered  Osteoporosis Screening?-?05/02/21 Vitamin D level 22.9  ?  Todays Visit:  Pt reports today with his fiance present, is using a wheelchair today because he still feels a little unsteady following his surgery, is aware of his f/u with Cards clinic on Thursday, reports site to groin is healing well, still unable to shower, has some tenderness to the area. Reports c/o urinary buring for last few days, U/A ordered today to evaluate. Pt has no questions regarding medications of follow up.  Denies chest pain, shortness of breath,?cough, fever, headache,?dizziness, weakness, abdominal pain, nausea,?vomiting, diarrhea, constipation, black/bloody stools, unplanned weight loss, night sweats, changes in urinary patterns, burning/odor with urination, depression, anxiety, and SI/HI.?  Review of Systems  Constitutional:?no fever, fatigue, weakness  Eye:?no vision loss, eye redness, drainage, or pain  ENMT:?no sore throat, ear pain, sinus pain/congestion, nasal congestion/drainage  Respiratory:?no cough, no wheezing, no shortness of breath  Cardiovascular:?no chest pain, no palpitations, no edema  Gastrointestinal:?no nausea, vomiting, or diarrhea. No abdominal pain  Genitourinary:?no dysuria, no urinary frequency or urgency, no hematuria  Hema/Lymph:?no abnormal bruising or bleeding  Endocrine:?no heat or cold intolerance, no excessive thirst or excessive  urination  Musculoskeletal:?no muscle or joint pain, no joint swelling  Integumentary:?no skin rash or abnormal lesion  Neurologic: no headache, no dizziness, no weakness or numbness  Physical Exam  Vitals & Measurements  T:?37? ?C (Oral)? HR:?75(Peripheral)? BP:?96/59?  HT:?164.00?cm? WT:?67.200?kg? BMI:?24.99?  General:?well-developed well-nourished in no acute distress  Eye: PERRLA, EOMI, clear conjunctiva, eyelids normal  HENT:?TMs/ear canals clear, oropharynx without erythema/exudate, oropharynx and nasal mucosal surfaces moist, no maxillary/frontal sinus tenderness to palpation  Neck: full range of motion, no thyromegaly or lymphadenopathy  Respiratory:?clear to auscultation bilaterally  Cardiovascular:?regular rate and rhythm without murmurs, gallops or rubs  Gastrointestinal:?soft, non-tender, non-distended with normal bowel sounds, without masses to palpation  Genitourinary: no CVA tenderness to palpation  Musculoskeletal:?full range of motion of all extremities/spine without limitation or discomfort  Integumentary: no rashes or skin lesions present  Neurologic: cranial nerves intact, no signs of peripheral neurological deficit, motor/sensory function intact  Assessment/Plan  1.?Wellness examination?Z00.00  Wellness Exam + labs today  Ordered:  1160F- Medication reconciliation completed during visit, Wellness examination  Dysuria  HTN (hypertension)  HLD - Hyperlipidemia  Systolic heart failure  Tobacco user  Colon cancer screening  Prostate cancer screening  Screening for STD (sexually transmitted disease)  Screening for HIV (human immun...  CBC w/ Auto Diff, Routine collect, *Est. 05/17/21 3:00:00 CDT, Blood, Order for future visit, *Est. Stop date 05/17/21 3:00:00 CDT, Lab Collect, Wellness examination  HTN (hypertension)  HLD - Hyperlipidemia, 05/17/21 12:33:00 CDT  Clinic Follow up, *Est. 11/17/21 3:00:00 CST, Order for future visit, Wellness examination  HLD - Hyperlipidemia  Colon cancer  screening  Prostate cancer screening, [facility]  Comprehensive Metabolic Panel, Routine collect, *Est. 05/17/21 3:00:00 CDT, Blood, Order for future visit, *Est. Stop date 05/17/21 3:00:00 CDT, Lab Collect, Wellness examination  HTN (hypertension)  HLD - Hyperlipidemia, 05/17/21 12:33:00 CDT  Free T4, Routine collect, *Est. 05/17/21 3:00:00 CDT, Blood, Order for future visit, *Est. Stop date 05/17/21 3:00:00 CDT, Lab Collect, Wellness examination  HTN (hypertension)  HLD - Hyperlipidemia, 05/17/21 12:32:00 CDT  Hemoglobin A1C [facility], Routine collect, *Est. 05/17/21 3:00:00 CDT, Blood, Order for future visit, *Est. Stop date 05/17/21 3:00:00 CDT, Lab Collect, Wellness examination  HTN (hypertension)  HLD - Hyperlipidemia, 05/17/21 12:32:00 CDT  Lipid Panel, Routine collect, *Est. 05/17/21 3:00:00 CDT, Blood, Order for future visit, *Est. Stop date 05/17/21 3:00:00 CDT, Lab Collect, Wellness examination  HTN (hypertension)  HLD - Hyperlipidemia, 05/17/21 12:33:00 CDT  Office/Outpatient Visit Level 4 Established 33895 PC, Wellness examination  Dysuria  HTN (hypertension)  HLD - Hyperlipidemia  Systolic heart failure  Tobacco user  Colon cancer screening  Prostate cancer screening  Screening for STD (sexually transmitted disease)  Screening for HIV (human immun...  Thyroid Stimulating Hormone, Routine collect, *Est. 05/17/21 3:00:00 CDT, Blood, Order for future visit, *Est. Stop date 05/17/21 3:00:00 CDT, Lab Collect, Wellness examination  HTN (hypertension)  HLD - Hyperlipidemia, 05/17/21 12:33:00 CDT  Urinalysis with Microscopic if Indicated, Routine collect, Urine, Order for future visit, *Est. 05/17/21 3:00:00 CDT, *Est. Stop date 05/17/21 3:00:00 CDT, Nurse collect, Wellness examination  HTN (hypertension)  HLD - Hyperlipidemia  ?  2.?Dysuria?R30.0  U/A Today for evaluation  Ordered:  1160F- Medication reconciliation completed during visit, Wellness examination  Dysuria  HTN (hypertension)   HLD - Hyperlipidemia  Systolic heart failure  Tobacco user  Colon cancer screening  Prostate cancer screening  Screening for STD (sexually transmitted disease)  Screening for HIV (human immun...  Office/Outpatient Visit Level 4 Established 08857 PC, Wellness examination  Dysuria  HTN (hypertension)  HLD - Hyperlipidemia  Systolic heart failure  Tobacco user  Colon cancer screening  Prostate cancer screening  Screening for STD (sexually transmitted disease)  Screening for HIV (human immun...  ?  3.?HTN (hypertension)?I10  BP Today??Not at goal  Continue meds as prescribed  Continued f/u with Cardiology clinic  Low Sodium Diet (Dash Diet - less than 2 grams of sodium per day).  Monitor Blood Pressure daily and log. Report any consistent numbers greater than 140/90..  Maintain healthy weight with goal BMI <30. Exercise 30 minutes per day 5 days per week.  Ordered:  1160F- Medication reconciliation completed during visit, Wellness examination  Dysuria  HTN (hypertension)  HLD - Hyperlipidemia  Systolic heart failure  Tobacco user  Colon cancer screening  Prostate cancer screening  Screening for STD (sexually transmitted disease)  Screening for HIV (human immun...  CBC w/ Auto Diff, Routine collect, *Est. 05/17/21 3:00:00 CDT, Blood, Order for future visit, *Est. Stop date 05/17/21 3:00:00 CDT, Lab Collect, Wellness examination  HTN (hypertension)  HLD - Hyperlipidemia, 05/17/21 12:33:00 CDT  Clinic Follow up, *Est. 11/17/21 3:00:00 CST, Order for future visit, Wellness examination  HLD - Hyperlipidemia  Colon cancer screening  Prostate cancer screening, [facility]  Comprehensive Metabolic Panel, Routine collect, *Est. 05/17/21 3:00:00 CDT, Blood, Order for future visit, *Est. Stop date 05/17/21 3:00:00 CDT, Lab Collect, Wellness examination  HTN (hypertension)  HLD - Hyperlipidemia, 05/17/21 12:33:00 CDT  Free T4, Routine collect, *Est. 05/17/21 3:00:00 CDT, Blood, Order for future  visit, *Est. Stop date 05/17/21 3:00:00 CDT, Lab Collect, Wellness examination  HTN (hypertension)  HLD - Hyperlipidemia, 05/17/21 12:32:00 CDT  Hemoglobin A1C [facility], Routine collect, *Est. 05/17/21 3:00:00 CDT, Blood, Order for future visit, *Est. Stop date 05/17/21 3:00:00 CDT, Lab Collect, Wellness examination  HTN (hypertension)  HLD - Hyperlipidemia, 05/17/21 12:32:00 CDT  Lipid Panel, Routine collect, *Est. 05/17/21 3:00:00 CDT, Blood, Order for future visit, *Est. Stop date 05/17/21 3:00:00 CDT, Lab Collect, Wellness examination  HTN (hypertension)  HLD - Hyperlipidemia, 05/17/21 12:33:00 CDT  Office/Outpatient Visit Level 4 Established 86826 PC, Wellness examination  Dysuria  HTN (hypertension)  HLD - Hyperlipidemia  Systolic heart failure  Tobacco user  Colon cancer screening  Prostate cancer screening  Screening for STD (sexually transmitted disease)  Screening for HIV (human immun...  Thyroid Stimulating Hormone, Routine collect, *Est. 05/17/21 3:00:00 CDT, Blood, Order for future visit, *Est. Stop date 05/17/21 3:00:00 CDT, Lab Collect, Wellness examination  HTN (hypertension)  HLD - Hyperlipidemia, 05/17/21 12:33:00 CDT  Urinalysis with Microscopic if Indicated, Routine collect, Urine, Order for future visit, *Est. 05/17/21 3:00:00 CDT, *Est. Stop date 05/17/21 3:00:00 CDT, Nurse collect, Wellness examination  HTN (hypertension)  HLD - Hyperlipidemia  ?  4.?HLD - Hyperlipidemia?E78.5  Continue statin as prescribed.?  Follow a low cholesterol, low saturated fat diet with less than 200 mg of cholesterol a day.?  Avoid fried foods and high saturated fats (jovel, sausage, cookies, cakes, chips, cheese, whole milk, butter, mayonnaise, creamy dressings, gravy, stew, gumbo, boudin, cracklins?and cream sauces).  Add flax seed or fish oil supplements to diet.?  Increase dietary fiber.?  Regular exercise improves cholesterol levels.  Physical activity 5 times a week for 30 minutes per day (or 150  minutes per week).?  Stressed importance of dietary modifications.  Ordered:  1160F- Medication reconciliation completed during visit, Wellness examination  Dysuria  HTN (hypertension)  HLD - Hyperlipidemia  Systolic heart failure  Tobacco user  Colon cancer screening  Prostate cancer screening  Screening for STD (sexually transmitted disease)  Screening for HIV (human immun...  CBC w/ Auto Diff, Routine collect, *Est. 05/17/21 3:00:00 CDT, Blood, Order for future visit, *Est. Stop date 05/17/21 3:00:00 CDT, Lab Collect, Wellness examination  HTN (hypertension)  HLD - Hyperlipidemia, 05/17/21 12:33:00 CDT  Clinic Follow up, *Est. 11/17/21 3:00:00 CST, Order for future visit, Wellness examination  HLD - Hyperlipidemia  Colon cancer screening  Prostate cancer screening, [facility]  Comprehensive Metabolic Panel, Routine collect, *Est. 05/17/21 3:00:00 CDT, Blood, Order for future visit, *Est. Stop date 05/17/21 3:00:00 CDT, Lab Collect, Wellness examination  HTN (hypertension)  HLD - Hyperlipidemia, 05/17/21 12:33:00 CDT  Free T4, Routine collect, *Est. 05/17/21 3:00:00 CDT, Blood, Order for future visit, *Est. Stop date 05/17/21 3:00:00 CDT, Lab Collect, Wellness examination  HTN (hypertension)  HLD - Hyperlipidemia, 05/17/21 12:32:00 CDT  Hemoglobin A1C [facility], Routine collect, *Est. 05/17/21 3:00:00 CDT, Blood, Order for future visit, *Est. Stop date 05/17/21 3:00:00 CDT, Lab Collect, Wellness examination  HTN (hypertension)  HLD - Hyperlipidemia, 05/17/21 12:32:00 CDT  Lipid Panel, Routine collect, *Est. 05/17/21 3:00:00 CDT, Blood, Order for future visit, *Est. Stop date 05/17/21 3:00:00 CDT, Lab Collect, Wellness examination  HTN (hypertension)  HLD - Hyperlipidemia, 05/17/21 12:33:00 CDT  Office/Outpatient Visit Level 4 Established 60915 PC, Wellness examination  Dysuria  HTN (hypertension)  HLD - Hyperlipidemia  Systolic heart failure  Tobacco user  Colon cancer screening  Prostate  cancer screening  Screening for STD (sexually transmitted disease)  Screening for HIV (human immun...  Thyroid Stimulating Hormone, Routine collect, *Est. 05/17/21 3:00:00 CDT, Blood, Order for future visit, *Est. Stop date 05/17/21 3:00:00 CDT, Lab Collect, Wellness examination  HTN (hypertension)  HLD - Hyperlipidemia, 05/17/21 12:33:00 CDT  Urinalysis with Microscopic if Indicated, Routine collect, Urine, Order for future visit, *Est. 05/17/21 3:00:00 CDT, *Est. Stop date 05/17/21 3:00:00 CDT, Nurse collect, Wellness examination  HTN (hypertension)  HLD - Hyperlipidemia  ?  5.?Systolic heart failure?I50.22  Follow up with Cardiology Clinic as scheduled on 05/21/21.  Last ECHO showed EF of [10]%.  Notify the clinic if you gain 3 or more pounds in one day or 5 or more pounds in one week.  Stressed importance of daily morning weights after urination but prior to breakfast on the same scale.  Low Sodium Diet (Dash Diet - less than 2 grams of sodium per day).  Follow a low cholesterol, low saturated fat diet with less that 200mg of cholesterol a day.  Maintain healthy weight with goal BMI <30. Perform 30 minutes of daily physical activity 5 days per week.  Report to ER for chest pain, SOB, difficulty breathing, abdominal distention or significant edema to lower extremities.  Ordered:  1160F- Medication reconciliation completed during visit, Wellness examination  Dysuria  HTN (hypertension)  HLD - Hyperlipidemia  Systolic heart failure  Tobacco user  Colon cancer screening  Prostate cancer screening  Screening for STD (sexually transmitted disease)  Screening for HIV (human immun...  Clinic Follow up, *Est. 11/17/21 3:00:00 CST, Order for future visit, Wellness examination  HLD - Hyperlipidemia  Colon cancer screening  Prostate cancer screening, [facility]  Office/Outpatient Visit Level 4 Established 06713 PC, Wellness examination  Dysuria  HTN (hypertension)  HLD - Hyperlipidemia  Systolic  heart failure  Tobacco user  Colon cancer screening  Prostate cancer screening  Screening for STD (sexually transmitted disease)  Screening for HIV (human immun...  ?  6.?Tobacco user?Z72.0  ?Smoking cessation discussed.?  Pt not ready to quit.  Discussed benefits of quitting including improved health, decreased cardiac/vascular/pulmonary/stroke risks as well as saving money.  Ordered:  1160F- Medication reconciliation completed during visit, Wellness examination  Dysuria  HTN (hypertension)  HLD - Hyperlipidemia  Systolic heart failure  Tobacco user  Colon cancer screening  Prostate cancer screening  Screening for STD (sexually transmitted disease)  Screening for HIV (human immun...  Clinic Follow up, *Est. 11/17/21 3:00:00 CST, Order for future visit, Wellness examination  HLD - Hyperlipidemia  Colon cancer screening  Prostate cancer screening, [facility]  Office/Outpatient Visit Level 4 Established 88333 PC, Wellness examination  Dysuria  HTN (hypertension)  HLD - Hyperlipidemia  Systolic heart failure  Tobacco user  Colon cancer screening  Prostate cancer screening  Screening for STD (sexually transmitted disease)  Screening for HIV (human immun...  ?  7.?Colon cancer screening?Z12.11  FIT Test ordered  Ordered:  1160F- Medication reconciliation completed during visit, Wellness examination  Dysuria  HTN (hypertension)  HLD - Hyperlipidemia  Systolic heart failure  Tobacco user  Colon cancer screening  Prostate cancer screening  Screening for STD (sexually transmitted disease)  Screening for HIV (human immun...  Clinic Follow up, *Est. 11/17/21 3:00:00 CST, Order for future visit, Wellness examination  HLD - Hyperlipidemia  Colon cancer screening  Prostate cancer screening, [facility]  Occult Blood Fecal Immunoassay, Routine collect, Stool, Order for future visit, *Est. 06/17/21 3:00:00 CDT, *Est. Stop date 06/17/21 3:00:00 CDT, Nurse collect, Colon cancer  screening  Office/Outpatient Visit Level 4 Established 00972 PC, Wellness examination  Dysuria  HTN (hypertension)  HLD - Hyperlipidemia  Systolic heart failure  Tobacco user  Colon cancer screening  Prostate cancer screening  Screening for STD (sexually transmitted disease)  Screening for HIV (human immun...  ?  8.?Prostate cancer screening?Z12.5  PSA Screening Ordered  Ordered:  1160F- Medication reconciliation completed during visit, Wellness examination  Dysuria  HTN (hypertension)  HLD - Hyperlipidemia  Systolic heart failure  Tobacco user  Colon cancer screening  Prostate cancer screening  Screening for STD (sexually transmitted disease)  Screening for HIV (human immun...  Clinic Follow up, *Est. 11/17/21 3:00:00 CST, Order for future visit, Wellness examination  HLD - Hyperlipidemia  Colon cancer screening  Prostate cancer screening, [facility]  Office/Outpatient Visit Level 4 Established 78377 PC, Wellness examination  Dysuria  HTN (hypertension)  HLD - Hyperlipidemia  Systolic heart failure  Tobacco user  Colon cancer screening  Prostate cancer screening  Screening for STD (sexually transmitted disease)  Screening for HIV (human immun...  Prostate Specific Antigen, Routine collect, *Est. 05/17/21 3:00:00 CDT, Blood, Order for future visit, *Est. Stop date 05/17/21 3:00:00 CDT, Lab Collect, Prostate cancer screening, 05/17/21 12:33:00 CDT  ?  9.?Screening for STD (sexually transmitted disease)?Z11.3  STD Screening ordered  Ordered:  1160F- Medication reconciliation completed during visit, Wellness examination  Dysuria  HTN (hypertension)  HLD - Hyperlipidemia  Systolic heart failure  Tobacco user  Colon cancer screening  Prostate cancer screening  Screening for STD (sexually transmitted disease)  Screening for HIV (human immun...  Office/Outpatient Visit Level 4 Established 84330 PC, Wellness examination  Dysuria  HTN (hypertension)  HLD - Hyperlipidemia   Systolic heart failure  Tobacco user  Colon cancer screening  Prostate cancer screening  Screening for STD (sexually transmitted disease)  Screening for HIV (human immun...  ?  10.?Screening for HIV (human immunodeficiency virus)?Z11.4  HIV screening ordered  Ordered:  1160F- Medication reconciliation completed during visit, Wellness examination  Dysuria  HTN (hypertension)  HLD - Hyperlipidemia  Systolic heart failure  Tobacco user  Colon cancer screening  Prostate cancer screening  Screening for STD (sexually transmitted disease)  Screening for HIV (human immun...  Office/Outpatient Visit Level 4 Established 80075 PC, Wellness examination  Dysuria  HTN (hypertension)  HLD - Hyperlipidemia  Systolic heart failure  Tobacco user  Colon cancer screening  Prostate cancer screening  Screening for STD (sexually transmitted disease)  Screening for HIV (human immun...  ?  Referrals  Clinic Follow up, *Est. 11/17/21 3:00:00 CST, Order for future visit, Wellness examination  HLD - Hyperlipidemia  Colon cancer screening  Prostate cancer screening, [facility]  RTC?prn and 6?months  Medications reviewed & reconciled during visit  Labs reviewed, verbalized understanding  Labs 1 week prior to next appointment  COVID 19 Precautions discussed  ?   Discussed with patient health goals related to [facility]?- Restore, Maintain,?&?Improve Health  Patient Goal this visit:?Improve  ?   Pt has 1 or more chronic illnesses with exacerbation, progression, or side effects of treatment / 2 or more stable chronic illnesses  I have reviewed prior external notes / reviewed results of each unique test /ordered a unique test /  This assessment required an independent historian.  Management of this patient was discussed with an external physician / other qhp / or appropriate source that was not separately reported.?There is moderate risk of morbidity from additional diagnostic testing or treatment including  prescription drug managing.  Diagnosis & treatment are significantly limited by social determinants of health.?  I spent a total of?35 ?minutes reviewing the patients chart prior to our face to face time, seeing the patient, speaking with nurse, and documenting in the record  ?   Problem List/Past Medical History  Ongoing  HLD - Hyperlipidemia  HTN (hypertension)  Obesity  SVT - Supraventricular tachycardia  Systolic heart failure  VT (ventricular tachycardia)  Historical  Defibrillator, device  Defibrillator, device  Procedure/Surgical History  Catheter placement in coronary artery(s) for coronary angiography, including intraprocedural injection(s) for coronary angiography, imaging supervision and interpretation; with left heart catheterization including intraprocedural injection(s) for left fernando (02/08/2021)  Fluoroscopy of Left Heart using Low Osmolar Contrast (02/08/2021)  Fluoroscopy of Multiple Coronary Arteries using Low Osmolar Contrast (02/08/2021)  Intravascular Doppler velocity and/or pressure derived coronary flow reserve measurement (coronary vessel or graft) during coronary angiography including pharmacologically induced stress; initial vessel (List separately in addition to code for primary pro (02/08/2021)  Measurement of Arterial Pressure, Coronary, Percutaneous Approach (02/08/2021)  Measurement of Arterial Pressure, Coronary, Percutaneous Approach (02/08/2021)  Measurement of Cardiac Sampling and Pressure, Left Heart, Percutaneous Approach (02/08/2021)  Insertion of Defibrillator Generator into Chest Subcutaneous Tissue and Fascia, Open Approach (09/12/2018)  Removal of Cardiac Rhythm Related Device from Trunk Subcutaneous Tissue and Fascia, Open Approach (09/12/2018)  Removal of implantable defibrillator pulse generator with replacement of implantable defibrillator pulse generator; dual lead system (09/12/2018)  Catheter placement in coronary artery(s) for coronary angiography, including  intraprocedural injection(s) for coronary angiography, imaging supervision and interpretation; with left heart catheterization including intraprocedural injection(s) for left fernando (08/29/2018)  Fluoroscopy of Left Heart using Low Osmolar Contrast (08/29/2018)  Fluoroscopy of Multiple Coronary Arteries using Low Osmolar Contrast (08/29/2018)  Measurement of Cardiac Sampling and Pressure, Left Heart, Percutaneous Approach (08/29/2018)  Drainage of Tonsils with Drainage Device, Open Approach (03/18/2016)  Incision & Drainage (Left, Neck) (03/18/2016)  Inspection of Neck, Open Approach (03/18/2016)  Defibrillator, device (06/15/2011)  ICD - Internal cardiac defibrillator procedure   Medications  Albuterol (Eqv-ProAir HFA) 90 mcg/inh inhalation aerosol, See Instructions  aspirin 81 mg oral tablet, 81 mg= 1 tab(s), Oral, Daily, 3 refills  atorvastatin 80 mg oral tablet, 80 mg= 1 tab(s), Oral, Daily, 3 refills  bumetanide 2 mg oral tablet, 2 mg= 1 tab(s), Oral, BID  clopidogrel 75 mg oral tablet, 75 mg= 1 tab(s), Oral, Daily  Entresto 24 mg-26 mg oral tablet, 1 tab(s), Oral, BID, 11 refills  metoprolol succinate 25 mg oral tablet, extended release, 25 mg= 1 tab(s), Oral, Daily  potassium chloride 10 mEq oral tablet, extended release, 10 mEq= 1 tab(s), Oral, BID, 2 refills  spironolactone 25 mg oral tablet, 25 mg= 1 tab(s), Oral, Daily, 2 refills  Vitamin D, 1 tab, Oral, Daily  Xarelto 20mg Tablet, 20 mg= 1 tab(s), Oral, Daily  Allergies  Egg  influenza virus vaccine, inactivated?(Allergy to eggs., Allergy to eggs.)  Social History  Abuse/Neglect  No, No, Yes, 05/17/2021  Alcohol - Denies Alcohol Use, 03/11/2015  Past, 05/17/2021  Employment/School  Employed, Activity level: Moderate physical work. Highest education level: High school. Operates hazardous equipment: No. Workplace hazards: Hazardous materials, Heavy lifting/twisting, Loud noises, Repetitive motion., 03/11/2015  Exercise  Self assessment: Good condition.,  03/24/2015  Exercise duration: 48. Exercise frequency: Daily. Exercise type: Walking., 03/11/2015  Home/Environment  Lives with Alone, Children, Significant other. Living situation: Home/Independent. Home equipment: CPAP/BiPAP. Alcohol abuse in household: No. Substance abuse in household: No. Smoker in household: Yes. Injuries/Abuse/Neglect in household: No. Feels unsafe at home: No. Safe place to go: Yes. Family/Friends available for support: Yes. Concern for family members at home: No. Major illness in household: No. Financial concerns: Yes. TV/Computer concerns: No., 03/11/2015  Nutrition/Health  Coumadin., Caffeine intake amount: 2-3 cokes 12 ounce daily.. Wants to lose weight: No. Sleeping concerns: No. Feels highly stressed: No., 03/11/2015  Sexual  Gender Identity Identifies as male., 01/22/2019  Substance Use - Denies Substance Abuse, 03/11/2015  Past, 04/30/2018  Tobacco  4 or less cigarettes(less than 1/4 pack)/day in last 30 days, No, 05/17/2021  Family History  Acute myocardial infarction.: Brother.  Cardiac arrest.: Brother.  Cardiovascular disease: Brother.  Heart disease: Mother, Father and Brother.  Primary malignant neoplasm of brain: Brother.  Immunizations  Vaccine Date Status   pneumococcal vacc 10/29/2010 Recorded   Health Maintenance  Health Maintenance  ???Pending?(in the next year)  ??? ??OverDue  ??? ? ? ?Colorectal Screening due??06/26/16??and every 1??year(s)  ??? ? ? ?HF-ACEI/ARB Prescribed if Clinically Indicated due??09/16/20??and every 1??year(s)  ??? ??Due?  ??? ? ? ?COPD Maintenance-Spirometry due??05/17/21??Unknown Frequency  ??? ? ? ?HF-Ejection Fraction Past 13 Months if Hospitalized due??05/17/21??and every 1??year(s)  ??? ? ? ?HF-Fluid Restriction/Low Sodium Diet Education due??05/17/21??and every 1??year(s)  ??? ? ? ?Lung Cancer Screening due??05/17/21??and every 1??year(s)  ??? ? ? ?Tetanus Vaccine due??05/17/21??and every 10??year(s)  ??? ? ? ?Zoster Vaccine  due??05/17/21??Unknown Frequency  ??? ??Due In Future?  ??? ? ? ?Obesity Screening not due until??01/01/22??and every 1??year(s)  ??? ? ? ?Smoking Cessation not due until??01/01/22??and every 1??year(s)  ??? ? ? ?Alcohol Misuse Screening not due until??01/02/22??and every 1??year(s)  ??? ? ? ?Functional Assessment not due until??01/02/22??and every 1??year(s)  ??? ? ? ?HF-LVEF not due until??05/04/22??and every 1??year(s)  ??? ? ? ?Diabetes Screening not due until??05/07/22??and every 1??year(s)  ??? ? ? ?Hypertension Management-BMP not due until??05/07/22??and every 1??year(s)  ??? ? ? ?Aspirin Therapy for CVD Prevention not due until??05/07/22??and every 1??year(s)  ???Satisfied?(in the past 1 year)  ??? ??Satisfied?  ??? ? ? ?ADL Screening on??05/17/21.??Satisfied by Gosia Parker LPN  ??? ? ? ?Alcohol Misuse Screening on??05/17/21.??Satisfied by Bhumika Orozco  ??? ? ? ?Aspirin Therapy for CVD Prevention on??05/07/21.??Satisfied by Shalini Rivera RN  ??? ? ? ?Blood Pressure Screening on??05/17/21.??Satisfied by Gosia Parker LPN  ??? ? ? ?Body Mass Index Check on??05/17/21.??Satisfied by Gosia Parker LPN  ??? ? ? ?COPD Maintenance-Spirometry on??04/26/21.??Satisfied by Trinh Copeland  ??? ? ? ?Coronary Artery Disease Maintenance-Antiplatelet Agent Prescribed on??05/17/21.  ??? ? ? ?Depression Screening on??05/17/21.??Satisfied by Gosia Parker LPN  ??? ? ? ?Diabetes Screening on??05/07/21.??Satisfied by Naga Barlow Jr.  ??? ? ? ?Functional Assessment on??05/06/21.??Satisfied by Suma Mendosa  ??? ? ? ?HF-Beta Blocker Prescribed if Clinically Indicated on??05/17/21.  ??? ? ? ?Hypertension Management-Blood Pressure on??05/17/21.??Satisfied by Gosia Parker LPN  ??? ? ? ?Influenza Vaccine on??03/04/21.??Satisfied by Yaa Blair RN  ??? ? ? ?Lipid Screening on??05/02/21.??Satisfied by Vineet Townsend  ??? ? ? ?Obesity Screening on??05/17/21.??Satisfied by  Elena DILL, Gosia  ??? ? ? ?Smoking Cessation on??05/17/21.??Satisfied by Luis Enrique PITT, Bhumika DIAS  ?

## 2022-05-03 NOTE — HISTORICAL [facility] CERNER
This is a historical note converted from [employer]. Formatting and pictures may have been removed.  Please reference [place] for original formatting and attached multimedia. Chief Complaint  Reports defib fired while shooting pool 30m PTA. Denies pain/SOB  History of Present Illness  61-year-old? male with?significant history of?CAD?status -post percutaneous coronary angioplasty, CHF?w/reduced?ejection?status-post?defibrillator?placement, history of subclinical hypothyroidism, presented to ED after his defibrillator fired around 9 pm. ?He said he was playing pool with group of friends when the defibrillator fired. Denies having chest pain at the time. Stated feeling fuzzy in his chest like someone pulled him forward. Denies feeling dizzy or confused. He ingested one bottle of beer, denies substance use. Denies being sick ,no fevers ,chills, URI sx, diarrhea, and or?abdominal pain. He has stopped smoking about months ago. He was previously admitted in November 2021 for the same complaint. Followed up with Cardiology , Dr. Gomez recently and had increased his Metoprolol dosage. His defibrillator is interrogated every couple of?months. While being in the ED, his defibrillator was interrogated and showed A fib with RVR HR 170s?and shocked once.  ?  In the ED, his vital signs were stable, cardiac monitor showed normal sinus rhythm with couple runs of PVCs. Troponin negative, elevated BNP, chest x-ray showed lungs are clear, pacing device in place. ?Heart borderline in size. ?Costophrenic angles are clear. ?EKG shows sinus tachycardia with frequent PVCs.??Aspirin was given.  Review of Systems  General: No fatigue, no weakness, no weight changes, no fever  HEENT:No head trauma, no vision changes, no hearing loss, no ear pain, no rhinorrhea, no sore throat  Cardiac: No SANFORD, no palpitations, no edema  Respiratory: No SOB, no cough, no sputum  GI: No n/v, no diarrhea, no constipation, no abdominal pain  MSK: No muscle  weakness, no muscle pain, no swelling  Neurologic: No trembling, no loss of sensation, no numbness, no faints or blackouts, no seizures  Psychiatric:?No mood changes, no depression, no memory changes  Physical Exam  Vitals & Measurements  T:?37.1? ?C (Oral)? HR:?101(Peripheral)? RR:?17? BP:?108/68? SpO2:?95%? WT:?75?kg?  General: Well-appearing, not in acute distress  HEENT: EOMI, MMM  Neck: no masses  Heart: RRR, no murmurs, no rubs, no gallops  Lungs: regular chest symmetry with respirations, no?wheezes, no?crackles  Abdomen: normal bowel sounds, soft, no tenderness  MSK: no muscle atrophy, no?weakness  Neurologic: Cranial nerves II-XII intact  Extremities: No edema  Assessment/Plan  A-fib with RVR s/p Defibrillator shock  patient is currently back into normal sinus rhythm with PVCs  he takes Xarelto 20 mg tablet daily. continue Xarelto and Plavix.  will consult cardiology in the AM for further recommendations  Troponin negative, pending echocardiogram  repeated TSH and fT4 levels  ?  History of?CHF w/ reduced ejection fraction  previous echocardiogram in May 2021 showed EF of 10 %  patient appears euvolemic during this visit  Repeating echocardiogram complete to re-evaluate cardiac function  continue cardiac meds , including entresto, metoprolol, and bumex  Resume cardiac diet, daily weights, I and O  ?  Hx of Subclinical Hypothyroidism  recent labs shows Hyperthyroidism on 11/06/21, may be the contributing factor to recent A-fib event  Patient continued to take Levothyroxine 50 mcg at home  hold levothyroxine  repeat TSH and fT4 today  ?  Dispo: Admit to team 4 for card consult and evaluate cardiac function  ?  Salty Crespo MD  [facility]  House Officer 2  ?  ?  ----------------------------------------------------------------  ?  Transfer of care to primary team addendum:  ?   Patient seen and examined this morning?after care was transferred from?the admitting overnight team.  ?    Subjective:  ?   61-year-old male with?PMH significant for?CAD s/p PCI to LAD 5/7/21, CHF?s/p AICD placement?9/2018 (EF of 10%?on TTE 5/3/2021),?paroxysmal A. fib,?hyperlipidemia, subclinical hypothyroidism,?who presents?to the ED?after he was shocked by his defibrillator. ?Patient states he was playing pool and his defibrillator shocked him. ?He did not notice any symptoms prior to the?shock. ?He denies having any chest pain at that time,?palpitations,?tach, diaphoresis, nausea, vomiting.??He reports?that he was hospitalized?for an AICD shock?1 month ago,?and reports that he was also asymptomatic then.? Patient reports taking all his medications as prescribed, not missing any dosages, including all his GDMT?and levothyroxine.? He denies any recent weight changes, cold or heat intolerance, myalgias,?diarrhea, or constipation.?? He has quit smoking a few months ago after his?stent placement, though has a 44-pack-year smoking history.? He only drinks alcohol occasionally, denies any illicit drug use. His ICD was interrogated?in the ED, which revealed that the patient was?in A. fib with RVR?at the time of his shock at a rate in the 170s.??Patient was loaded with aspirin 325 mg.? Troponins?trended x2 which were negative. ?EKG?upon admission revealed normal sinus rhythm?with age-indeterminate inferior and septal infarct. ?He was admitted to?telemetry?for observation.  ?  Objective:  ?   Vital Signs (last 24 hrs)_____??????????Last Charted___________  Temp Oral??????????????????????37.1 DegC ?(DEC 17 07:15)  Heart Rate Peripheral?????????????????81 bpm ?(DEC 17 08:00)  Resp Rate???????????????????????14 br/min ?(DEC 17 08:00)  SBP??????????????????????98 mmHg ?(DEC 17 08:00)  DBP??????????????????????61 mmHg ?(DEC 17 08:00)  SpO2??????????????????????98 % ?(DEC 17 08:00)  Weight??????????????????????75 kg ?(DEC 17 01:21)  Height??????????????????????168 cm ?(DEC 17 01:21)  BMI??????????????????????26.57 ?(DEC 17  01:21)  ?   General: Well-appearing, well-nourished? man lying in bed in no acute distress  Eye: PERRL, EOMI  HENT: Normocephalic, atraumatic, moist mucous membranes  Neck: Supple, nontender, no JVD  Respiratory: Scattered expiratory wheezing bilaterally. ?Normal work of breathing on room air  Cardiovascular: Regular rate and rhythm, no murmurs, rubs, or gallops. ?2+ radial pulses.? No peripheral edema  Gastrointestinal:?Soft, nontender, nondistended  Musculoskeletal:?Moves all extremities with full range of motion.? No deformities  Integumentary:?Warm, dry, intact. No rashes  Neurologic:?Alert and oriented x3. Normal speech. No gross deficits  Psychiatric:?Appropriate mood and affect  ?   Assessment/Plan  ?  A-fib with RVR s/p Defibrillator shock  -Patient is currently back into normal sinus rhythm with PVCs, T wave inversions in the inferior leads,?V3-V6 that are not new?when compared to prior EKGs from 1 month ago  -Continue home Xarelto.? [id number]?score of 3  -Troponin negative?x2  -Continue home metoprolol succinate 50 mg twice daily  -Free T4?1.95, TSH suppressed at <0.0083,?hypothyroidism may be?contributing to patients?dysrhythmias  ?   CHF w/ reduced ejection fraction  -Previous echocardiogram in May 2021 showed EF of 10 %  -BNP is?1057 on admission,?though patient appears euvolemic?on examination  -Continue home GDMT,?including?Entresto,?Bumex, metoprolol succinate  -Repeat ROBBY this morning  -Daily weights,?strict I&Os  ?   CAD s/p PCI to LAD in 5/2021  -Troponins?negative x2. ?EKG?on admission revealing normal sinus rhythm PVCs,?T wave inversions in the inferior leads,?V3-V6 that are not new?when compared to prior EKGs from 1 month ago  -Continue?home Xarelto, Plavix, metoprolol?succinate  -Cardiac diet  ?   Hx of Subclinical Hypothyroidism  -Recent labs shows Hyperthyroidism on 11/06/21, may be the contributing factor to recent A-fib event. ?He was instructed to?reduce levothyroxine dose to  37 mcg daily  -Repeat TSH?<0.083, free T4 1.95  -Holding home levothyroxine. ?Patient?should discontinue levothyroxine upon discharge as hypothyroidism may be?causing?the patients dysrhythmias  ?  ?  ?  Dash Enamorado MD  Internal Medicine, PGY1  I have reviewed the patients history, residents? findings on physical examination, diagnosis and treatment plan. Care provided was reasonable and necessary.Hx of multivessel CAD.S/p LAD stent and ICD for ischemaiccardiomyopathy. admitted post shock. enzymes neg EKG no acute chnages.ECho done this am.onmaximal medical therapy.   Problem List/Past Medical History  Ongoing  CAD S/P percutaneous coronary angioplasty  HLD - Hyperlipidemia  HTN (hypertension)  Obesity  SVT - Supraventricular tachycardia  Systolic heart failure  VT (ventricular tachycardia)  Historical  Defibrillator, device  Defibrillator, device  Procedure/Surgical History  Catheter placement in coronary artery(s) for coronary angiography, including intraprocedural injection(s) for coronary angiography, imaging supervision and interpretation; with left heart catheterization including intraprocedural injection(s) for left fernando (02/08/2021)  Fluoroscopy of Left Heart using Low Osmolar Contrast (02/08/2021)  Fluoroscopy of Multiple Coronary Arteries using Low Osmolar Contrast (02/08/2021)  Intravascular Doppler velocity and/or pressure derived coronary flow reserve measurement (coronary vessel or graft) during coronary angiography including pharmacologically induced stress; initial vessel (List separately in addition to code for primary pro (02/08/2021)  Measurement of Arterial Pressure, Coronary, Percutaneous Approach (02/08/2021)  Measurement of Arterial Pressure, Coronary, Percutaneous Approach (02/08/2021)  Measurement of Cardiac Sampling and Pressure, Left Heart, Percutaneous Approach (02/08/2021)  Insertion of Defibrillator Generator into Chest Subcutaneous Tissue and Fascia, Open Approach (09/12/2018)  Removal of  Cardiac Rhythm Related Device from Trunk Subcutaneous Tissue and Fascia, Open Approach (09/12/2018)  Removal of implantable defibrillator pulse generator with replacement of implantable defibrillator pulse generator; dual lead system (09/12/2018)  Catheter placement in coronary artery(s) for coronary angiography, including intraprocedural injection(s) for coronary angiography, imaging supervision and interpretation; with left heart catheterization including intraprocedural injection(s) for left fernando (08/29/2018)  Fluoroscopy of Left Heart using Low Osmolar Contrast (08/29/2018)  Fluoroscopy of Multiple Coronary Arteries using Low Osmolar Contrast (08/29/2018)  Measurement of Cardiac Sampling and Pressure, Left Heart, Percutaneous Approach (08/29/2018)  Drainage of Tonsils with Drainage Device, Open Approach (03/18/2016)  Incision & Drainage (Left, Neck) (03/18/2016)  Inspection of Neck, Open Approach (03/18/2016)  Defibrillator, device (06/15/2011)  ICD - Internal cardiac defibrillator procedure   Medications  Inpatient  albuterol 0.63 mg/3 mL (0.021%) inhalation solution, 0.63 mg= 0.76 mL, NEB, q4hr Resp, PRN  bumetanide, 2 mg= 2 tab(s), Oral, BID  clopidogrel 75 mg oral tablet, 75 mg= 1 tab(s), Oral, Daily  Entresto 24 mg-26 mg oral tablet, See Instructions, Oral, BID  metoprolol succinate 50 mg oral capsule, extended release, 50 mg= 1 cap(s), Oral, BID  potassium chloride 20 mEq oral TABLET extended release, 20 mEq= 1 tab(s), Oral, BID  Vitamin D, 1 tab, Oral, Daily  Xarelto, 20 mg= 2 tab(s), Oral, Daily  Home  Albuterol (Eqv-ProAir HFA) 90 mcg/inh inhalation aerosol, See Instructions  bumetanide 2 mg oral tablet, 2 mg= 1 tab(s), Oral, BID, 2 refills  clopidogrel 75 mg oral tablet, 75 mg= 1 tab(s), Oral, Daily, 11 refills  Entresto 24 mg-26 mg oral tablet, See Instructions  levothyroxine 50 mcg (0.05 mg) oral tablet, 50 mcg= 1 tab(s), Oral, Daily,? ?Not taking  levothyroxine 75 mcg (0.075 mg) oral tablet, 0.038  mg= 0.5 tab(s), Oral, Daily  metoprolol succinate 25 mg oral tablet, extended release, 25 mg= 1 tab(s), Oral, Daily,? ?Not taking  metoprolol succinate 50 mg oral capsule, extended release, 50 mg= 1 cap(s), Oral, BID, 3 refills  potassium chloride 10 mEq oral tablet, extended release, 20 mEq= 2 tab(s), Oral, BID, 3 refills  spironolactone 25 mg oral tablet, See Instructions, 11 refills,? ?Not taking  Vitamin D, 1 tab, Oral, Daily  Xarelto 20mg Tablet, 20 mg= 1 tab(s), Oral, Daily, 11 refills  Allergies  Egg  influenza virus vaccine, inactivated?(Allergy to eggs., Allergy to eggs.)  Social History  Abuse/Neglect  No, No, Yes, 11/23/2021  No, 11/06/2021  Alcohol - Denies Alcohol Use, 03/11/2015  Past, 05/17/2021  Employment/School  Employed, Activity level: Moderate physical work. Highest education level: High school. Operates hazardous equipment: No. Workplace hazards: Hazardous materials, Heavy lifting/twisting, Loud noises, Repetitive motion., 03/11/2015  Exercise  Exercise duration: 48. Exercise frequency: Daily. Exercise type: Walking., 03/11/2015  Home/Environment  Lives with Alone, Children, Significant other. Living situation: Home/Independent. Home equipment: CPAP/BiPAP. Alcohol abuse in household: No. Substance abuse in household: No. Smoker in household: Yes. Injuries/Abuse/Neglect in household: No. Feels unsafe at home: No. Safe place to go: Yes. Family/Friends available for support: Yes. Concern for family members at home: No. Major illness in household: No. Financial concerns: Yes. TV/Computer concerns: No., 03/11/2015  Nutrition/Health  Coumadin., Caffeine intake amount: 2-3 cokes 12 ounce daily.. Wants to lose weight: No. Sleeping concerns: No. Feels highly stressed: No., 03/11/2015  Sexual  Gender Identity Identifies as male., 01/22/2019  Spiritual/Cultural  [employer], 07/22/2021  Substance Use - Denies Substance Abuse, 03/11/2015  Past, 04/30/2018  Tobacco  Former smoker, quit more than 30 days ago,  No, 11/23/2021  Family History  Acute myocardial infarction.: Brother.  Cardiac arrest.: Brother.  Cardiovascular disease: Brother.  Heart disease: Mother, Father and Brother.  Primary malignant neoplasm of brain: Brother.  Immunizations  Vaccine Date Status   pneumococcal vacc 10/29/2010 Recorded   Lab Results  Labs Last 24 Hours?  Chemistry? Hematology/Coagulation?   Sodium Lvl: 136 mmol/L (12/16/21 21:46:00) WBC: 7.7 x10(3)/mcL (12/16/21 21:46:00)   Potassium Lvl: 3.5 mmol/L (12/16/21 21:46:00) RBC: 4.83 x10(6)/mcL (12/16/21 21:46:00)   Chloride: 100 mmol/L (12/16/21 21:46:00) Hgb:?13.4 gm/dL?Low (12/16/21 21:46:00)   CO2: 25 mmol/L (12/16/21 21:46:00) Hct: 40.9 % (12/16/21 21:46:00)   Calcium Lvl: 9.5 mg/dL (12/16/21 21:46:00) Platelet: 342 x10(3)/mcL (12/16/21 21:46:00)   Glucose Lvl: 101 mg/dL (12/16/21 21:46:00) MCV: 84.7 fL (12/16/21 21:46:00)   BUN:?8.1 mg/dL?Low (12/16/21 21:46:00) MCH: 27.7 pg (12/16/21 21:46:00)   Creatinine: 0.91 mg/dL (12/16/21 21:46:00) MCHC: 32.8 gm/dL (12/16/21 21:46:00)   eGFR-AA: >105 (12/16/21 21:46:00) RDW: 13.2 % (12/16/21 21:46:00)   eGFR-NGOC: 90 mL/min/1.73 m2 (12/16/21 21:46:00) MPV: 8.7 fL (12/16/21 21:46:00)   Bili Total: 0.5 mg/dL (12/16/21 21:46:00) Abs Neut: 5.77 x10(3)/mcL (12/16/21 21:46:00)   Bili Direct: 0.2 mg/dL (12/16/21 21:46:00) Neutro Auto: 75 % (12/16/21 21:46:00)   Bili Indirect: 0.3 mg/dL (12/16/21 21:46:00) Lymph Auto: 11 % (12/16/21 21:46:00)   AST: 16 unit/L (12/16/21 21:46:00) Mono Auto: 10 % (12/16/21 21:46:00)   ALT: 11 unit/L (12/16/21 21:46:00) Eos Auto: 2 % (12/16/21 21:46:00)   Alk Phos: 95 unit/L (12/16/21 21:46:00) Abs Eos: 0.2 x10(3)/mcL (12/16/21 21:46:00)   Total Protein: 6.7 gm/dL (12/16/21 21:46:00) Basophil Auto: 1 % (12/16/21 21:46:00)   Albumin Lvl: 3.5 gm/dL (12/16/21 21:46:00) Abs Neutro: 5.77 x10(3)/mcL (12/16/21 21:46:00)   Globulin: 3.2 gm/dL (12/16/21 21:46:00) Abs Lymph: 0.9 x10(3)/mcL (12/16/21 21:46:00)   A/G Ratio: 1.1 ratio  (12/16/21 21:46:00) Abs Mono: 0.8 x10(3)/mcL (12/16/21 21:46:00)   BNP:?1057.6 pg/mL?High (12/16/21 21:46:00) Abs Baso: 0 x10(3)/mcL (12/16/21 21:46:00)   Total CK: 68 U/L (12/16/21 21:46:00) NRBC%: 0.0 (12/16/21 21:46:00)   CK MB: 1.2 ng/mL (12/16/21 21:46:00) IG%: 0 % (12/16/21 21:46:00)   Troponin-I: 0.02 ng/mL (12/16/21 21:46:00) IG#: 0.01 (12/16/21 21:46:00)   U pH: 5.5 (12/16/21 22:12:00)    Diagnostic Results  Accession:?[id number]  Order:?XR Chest 1 View  Report Dt/Tm:?12/16/2021 22:10  Report:?  Chest one view  ?  Clinical history is dyspnea  ?  This is compared to a previous study from 11/6/2021  ?  Lungs are clear. Pacing device is in place. Heart borderline in size.  Costophrenic angles are clear.  ?  IMPRESSION: No acute disease is seen  ?      echo MR2+,AR1,TR,EF10%. dilated LV   ICD device ok

## 2022-05-03 NOTE — HISTORICAL [facility] CERNER
This is a historical note converted from Marcin. Formatting and pictures may have been removed.  Please reference Marcin for original formatting and attached multimedia. Chief Complaint  Here for check up. Denies chest pain, denies SOB, denies dizziness. ADLs unchanged.  History of Present Illness  57?year old with a past medical history of?nonischemic cardiomyopathy 2010, status post [employer] ICD inserted in 2010 with recent generator change in 09/2018, hypertension, atrial fibrillation no coumadin presents for 3 month follow up and ICD interrogation.??ICD interrogation completed today revealed 35 VT episodes, VF on 11.25.18 with 36J shock with successful termination, VT 2 on 12.18.18 with successful termination with 2nd ATP attempt; normal function with no changes made with recommendation to return in 3 months.? Patient states he did not feel the shock that?was delivered on November 25, 2018. ?He denies chest pain, shortness of breath, palpitations, orthopnea, PND, peripheral edema, syncope, or any noticed activity limitations. ?He reports compliance with his medications and states he has not missed any doses of his carvedilol.  ?  Review of Systems  Constitutional: negative for fever,chills, sweats, weakness, fatigue, decreased activity?????  Eye: negative  ENMT: negative  Respiratory: negative?  Cardiovascular: negative  Gastrointestinal: negative?for nausea, vomiting, abdominal pain, constipation, diarrhea  Genitourinary: negative  Hema/Lymph: negative?for bruising/bleeding tendency, negative for swollen lymph glands  Endocrine: negative  Immunologic: negative  Musculoskeletal: negative  Integumentary: negative  Neurologic: negative  Psychiatric: negative  All Other ROS: negative  Physical Exam  Vitals & Measurements  T:?36.7? ?C (Oral)? HR:?79(Peripheral)? RR:?20? BP:?109/83? SpO2:?100%? WT:?73.5?kg? WT:?73.5?kg?  General: alert and oriented/no acute distress  Eye: EOMI/normal conjunctiva/vision  unchanged  HENT: normocephalic/normal hearing/moist oral mucosa  Neck: supple/nontender/no carotid bruit/no JVD  Respiratory: lungs CTA/nonlabored respirations/BS equal/symmetrical expansion/no chest wall tenderness  Cardiovascular: normal rate/normal rhythm/no murmur/no edema  Gastrointestinal: soft/nontender/nondistended  Musculoskeletal: normal ROM/normal strength  Integumentary: warm/dry/pink/intact  Neurologic: alert/oriented/normal sensory/no focal deficits  Psychiatric: cooperative/appropriate mood and affect/normal judgment  Assessment/Plan  Heart Failure with reduced Ejection Fraction - NYHA Class?I-II  [facility] August 2018 - moderate, non obstructive CAD  Continue with GDMT with Carvedilol, Lisinopril, and Spironolactone  ICD interrogation today revealed 35 VT episodes, VF on 11.25.18 with 36J shock with successful termination, VT 2 on 12.18.18 with successful termination with 2nd ATP attempt; normal function with no changes made with recommendation to return in 3 months  Will check BMP, Mg level today  Will load with Amiodarone 400mg TID x 5 days, then Amiodarone 400mg BID x 5 days, then Amiodarone 200mg daily  ?   Atrial Fibrillation  Continue Coumadin as directed  Follow up in Coumadin clinic for regular INR checks  ?   HTN (hypertension)  Well controlled?on current therapy  ?   Tobacco use  Counseled on the?importance of smoking cessation  Continues to smoke about 1/2 ppd - not interested in quitting at this time  ?  ?   BMP, Mg level today  Follow up in Cardiology Clinic in 3 months  Continue with routine?ICD interrogations as directed  Continue to follow-up in the Coumadin clinic for regular INR checks  ??  ?   Problem List/Past Medical History  Ongoing  HLD - Hyperlipidemia  HTN (hypertension)  Obesity  SVT - Supraventricular tachycardia  Systolic heart failure  VT (ventricular tachycardia)  Historical  Defibrillator, device  Defibrillator, device  Procedure/Surgical History  Incision & Drainage (Left,  Neck) (03/18/2016)  Defibrillator, device (06/15/2011)  ICD - Internal cardiac defibrillator procedure   Medications  acetaminophen-codeine 300 mg-30 mg oral tablet., 1 tab(s), Oral, q4hr, PRN  aspirin 81 mg oral tablet, 81 mg= 1 tab(s), Oral, Daily, 3 refills  carvedilol 25 mg oral tablet, 50 mg= 2 tab(s), Oral, BID, 3 refills  lisinopril 10 mg oral tablet, 10 mg= 1 tab(s), Oral, Daily, 3 refills  potassium chloride 10 mEq oral tablet, extended release, 10 mEq= 1 tab(s), Oral, BID, 1 refills  simvastatin 40 mg oral tablet, 40 mg= 1 tab(s), Oral, Once a day (at bedtime), 3 refills  spironolactone 25 mg oral tablet, 25 mg= 1 tab(s), Oral, Daily, 2 refills  Vitamin D, 1 tab, Oral, Daily  warfarin 2.5 mg oral tablet, See Instructions  warfarin 2.5 mg oral tablet, See Instructions  Allergies  Egg  influenza virus vaccine, inactivated?(Allergy to eggs., Allergy to eggs.)  Social History  Alcohol - Denies Alcohol Use, 03/11/2015  Past, 04/30/2018  Employment/School  Employed, Activity level: Moderate physical work. Highest education level: High school. Operates hazardous equipment: No. Workplace hazards: Hazardous materials, Heavy lifting/twisting, Loud noises, Repetitive motion., 03/11/2015  Exercise  Self assessment: Good condition., 03/24/2015  Exercise duration: 48. Exercise frequency: Daily. Exercise type: Walking., 03/11/2015  Home/Environment  Lives with Alone, Children, Significant other. Living situation: Home/Independent. Home equipment: CPAP/BiPAP. Alcohol abuse in household: No. Substance abuse in household: No. Smoker in household: Yes. Injuries/Abuse/Neglect in household: No. Feels unsafe at home: No. Safe place to go: Yes. Family/Friends available for support: Yes. Concern for family members at home: No. Major illness in household: No. Financial concerns: Yes. TV/Computer concerns: No., 03/11/2015  Nutrition/Health  Coumadin., Caffeine intake amount: 2-3 cokes 12 ounce daily.. Wants to lose weight: No.  Sleeping concerns: No. Feels highly stressed: No., 03/11/2015  Sexual  Gender Identity Identifies as male., 01/22/2019  Substance Abuse - Denies Substance Abuse, 03/11/2015  Past, 04/30/2018  Tobacco  10 or more cigarettes (1/2 pack or more)/day in last 30 days, No, Ready to change: No., 01/22/2019  Current every day smoker, No, 10/16/2018  Current every day smoker Use:. Cigarettes Type:. 5 per day. Started age 19 Years. Previous treatment: None., 08/21/2018  Family History  Acute myocardial infarction.: Brother.  Cardiac arrest.: Brother.  Cardiovascular disease: Brother.  Heart disease: Mother, Father and Brother.  Primary malignant neoplasm of brain: Brother.

## 2022-05-03 NOTE — HISTORICAL [facility] CERNER
This is a historical note converted from Marcin. Formatting and pictures may have been removed.  Please reference [place] for original formatting and attached multimedia. Chief Complaint  here for stress test results denies chest pain or sob simce last visit no questions today  History of Present Illness  Mr. Nicole Garcia is a 58yo WM with h/o nonischemic cardiomyopathy 2010, status post [employer] dual pacemaker inserted in 2010, hypertension, atrial fibrillation on Coumadin is here for follow-up and?results of nuclear stress test.??Pt denies any cardiac sxs, denies any CP, SOB, palpitations, dizziness, weakness, orthopnea. Compliant with medications. Continues to smoke?although he says that he is smoking less than?this.??Patient is known to have ICD which is at [employer].? Recent stress nuclear test shows large reversible inferior apical defect moderate sized inferior reversible defect. ?Results of this test were discussed with the patient.? He understands the results of this test?and is willing to move forward with coronary angiography.? We will plan for?coronary angiography on?8/29/2018 and will schedule?ICD generator change at a later date.  ?  Review of Systems  Constitutional: No recent changes in?weight.  HEENT: No headache. No tinnitus.  Cardiac: No chest pain, no palpitations, no lower extremity edema, no orthopnea, no paroxysmal nocturnal dyspnea  Resp: No shortness of breath, no dyspnea on exertion,?no coughing, no hemoptysis  GI: No constipation, no diarrhea, no nausea, no emesis  Neurologic: No dizziness,?no syncope  Physical Exam  Vitals & Measurements  T:?36.9? ?C (Oral)? HR:?77(Peripheral)? RR:?20? BP:?127/68? SpO2:?98%? WT:?74.4?kg? WT:?74.4?kg?  Gen: well-nourished, well-developed?58yo WM?in no acute distress  HEENT: Autraumatic, normocephalic.  Neck: No JVD or carotid bruits. No lymphadenopathy.  Heart: RRR, no murmurs, gallops, clicks or rubs.  Lungs: CTAB without rales, wheezes or rhonchi. Normal  work of breathing. Chest rise symmetrical on inspiration.  Abd: Soft, non-tender, non-distended and without guarding. Bowel sounds present x4 quadrants.  Extremities: Radial and pedal pulses 2+ bilaterally, no LE edema.  MSK: Moves all extremities purposefully.  Neuro: Responds well to commands.  Skin: Warm, dry and without rashes.  Assessment/Plan  1.?Abnormal nuclear stress test  -Multiple reversible defects noted  -Results as below  -Plan for left heart cath on?8/29/2018  ?  2.?Implantable cardioverter-defibrillator (ICD) at end of battery life  -[employer] ICD?implant in 2010  -Battery at [facility]  -We will schedule gen change after [facility]  ?  3.?HTN (hypertension)  - /68 today?  - well controlled  - continue current medications  - refilled all meds today  ?  4.?HLD - Hyperlipidemia  - continue statin therapy  ?  5.?Tobacco user  -Discussed smoking cessation  ?   Problem List/Past Medical History  Ongoing  HLD - Hyperlipidemia  HTN (hypertension)  Obesity  SVT - Supraventricular tachycardia  Systolic heart failure  VT (ventricular tachycardia)  Historical  Defibrillator, device  Defibrillator, device  Procedure/Surgical History  Drainage of Tonsils with Drainage Device, Open Approach (03/18/2016)  Incision & Drainage (Left, Neck) (03/18/2016)  Inspection of Neck, Open Approach (03/18/2016)  Defibrillator, device (06/15/2011)  ICD - Internal cardiac defibrillator procedure   Medications  aspirin 81 mg oral tablet, 81 mg= 1 tab(s), Oral, Daily, 3 refills  carvedilol 25 mg oral tablet, 50 mg= 2 tab(s), Oral, BID, 3 refills  lisinopril 10 mg oral tablet, 10 mg= 1 tab(s), Oral, Daily  Nexium 20 mg oral delayed release capsule, 20 mg= 1 cap(s), Oral, Daily  potassium chloride 10 mEq oral tablet, extended release, 10 mEq= 1 tab(s), Oral, BID, 1 refills  simvastatin 40 mg oral tablet, 40 mg= 1 tab(s), Oral, Once a day (at bedtime), 3 refills  spironolactone 25 mg oral tablet, 25 mg= 1 tab(s), Oral, Daily  Vitamin D, 1  tab, Oral, Daily  warfarin 2.5 mg oral tablet, See Instructions  Allergies  Egg  influenza virus vaccine, inactivated?(Allergy to eggs., Allergy to eggs.)  Social History  Alcohol - Denies Alcohol Use, 03/11/2015  Past, 04/30/2018  Employment/School  Employed, Activity level: Moderate physical work. Highest education level: High school. Operates hazardous equipment: No. Workplace hazards: Hazardous materials, Heavy lifting/twisting, Loud noises, Repetitive motion., 03/11/2015  Exercise  Self assessment: Good condition., 03/24/2015  Exercise duration: 48. Exercise frequency: Daily. Exercise type: Walking., 03/11/2015  Home/Environment  Lives with Alone, Children, Significant other. Living situation: Home/Independent. Home equipment: CPAP/BiPAP. Alcohol abuse in household: No. Substance abuse in household: No. Smoker in household: Yes. Injuries/Abuse/Neglect in household: No. Feels unsafe at home: No. Safe place to go: Yes. Family/Friends available for support: Yes. Concern for family members at home: No. Major illness in household: No. Financial concerns: Yes. TV/Computer concerns: No., 03/11/2015  Nutrition/Health  Coumadin., Caffeine intake amount: 2-3 cokes 12 ounce daily.. Wants to lose weight: No. Sleeping concerns: No. Feels highly stressed: No., 03/11/2015  Sexual  Substance Abuse - Denies Substance Abuse, 03/11/2015  Past, 04/30/2018  Tobacco  Current every day smoker Use:. Cigarettes Type:. 5 per day. Started age 19 Years. Previous treatment: None., 08/21/2018  Family History  Acute myocardial infarction.: Brother.  Cardiac arrest.: Brother.  Cardiovascular disease: Brother.  Heart disease: Mother, Father and Brother.  Primary malignant neoplasm of brain: Brother.  Diagnostic Results  Cardiac Perfusion Imaging Study (8/20/2018)  Stress Testing Impression:  EKG portion of stress test is nondiagnostic due to abnormal baseline EKG.? There were frequent isolated PVCs with one ventricular couplet.  Nuclear  Imaging Impression:  Abnormal.? Moderately large reversible inferior apical defect of moderate intensity indicate ischemia or attenuation.? Moderate size inferior reversible defect indicates ischemia or attenuation.? Small moderate amount of anterior apical reversibility indicates jay jay-infarct ischemia or attenuation.? Severely reduced LVEF of 31%.  Recommendation:  Recommend cardiac catheterization depending on clinical appropirateness.  ?      I was present with the resident during the history and exam.  ? ?  [?x ] I discussed the case with the resident and agree with the findings and plan as documented in the resident?s note.  [ ] I discussed the case with the resident and agree with the findings and plan as documented in the resident?s note except: [ ]  ?

## 2022-05-03 NOTE — HISTORICAL [facility] CERNER
This is a historical note converted from Marcin. Formatting and pictures may have been removed.  Please reference Marcin for original formatting and attached multimedia. Chief Complaint  Pt AAOX3. States he was lyiing down when his defibrillator went off. Denies chest pain as well as SOB at this time.  Reason for Consultation  ICD firing  History of Present Illness  60-year-old male with a past medical history?of?hypertension, initially?nonischemic cardiomyopathy?s/p [employer]?ICD,?VT, atrial fibrillation,?whose EF dropped?to?10%?on 12/2020 and underwent coronary angiogram?on 2/2021?revealing 70% mid LAD stenosis?IFR + at 0.83,?RCA?70% proximal and mid,?who recently underwent protected PCI to LAD with impella at [facility]. He was last seen in clinic in July 2021. he had been feeling well but on?he reports that?on Saturday?afternoon?he was sitting?eating?and all of a sudden?his ICD fired.? He?does not think he lost consciousness.??Before the?shock, he did not experience any?palpitations, dizziness, lightheadedness,?shortness of breath or any other symptoms.  Review of Systems  Constitutional: negative for fever,chills, sweats, weakness, fatigue, decreased activity?????  Eye: negative for blurry vision, vision change  ENMT: negative for sore throat, nasal congestion  Respiratory: negative?for shortness of breath, cough, wheezing  Cardiovascular: negative for chest pain, dyspnea on exertion, orthopnea, PND, lower extremity edema, palpitations, claudication  Gastrointestinal: negative?for nausea, vomiting, abdominal pain, constipation, diarrhea, blood in stool  Genitourinary: negative for hematuria, nocturia, dysuria  Endocrine: negative for abnormal thirst, temperature  Musculoskeletal: negative for back pain, joint pain  Integumentary: negative for abnormal mole  Neurologic: negative for weakness, numbness, headaches, shooting pain  Hematology: negative for easy bruising, easy bleeding  Allergy: negative for watery  eyes, sneezing  Psychiatric: negative for loss of interest, anxiety, stress  ?  Physical Exam  Vitals & Measurements  T:?36.8? ?C (Oral)? TMIN:?36.3? ?C (Oral)? TMAX:?37.6? ?C (Oral)? HR:?47(Peripheral)? RR:?70? BP:?110/72? SpO2:?95%?  General: alert and oriented/no acute distress  Eye: EOMI/normal conjunctiva/no xanthelasma  HENT: normocephalic/moist oral mucosa  Neck: supple/nontender/no carotid bruit  Respiratory: lungs CTA/nonlabored respirations/BS equal/symmetrical expansion/no chest wall tenderness  Cardiovascular: normal rate/normal rhythm/no murmur/normal peripheral perfusion/no edema/no JVD  Gastrointestinal: soft/nontender  Musculoskeletal: normal ROM  Integumentary: warm/dry/pink/intact  Neurologic: alert/oriented/normal sensory/no focal deficits  Psychiatric: cooperative/appropriate mood and affect/normal judgment  Assessment/Plan  Cardiac dysrhythmia?I49.9  Defibrillator discharge?Z45.02  Medical screening exam?[id number]  VT?with shock in the setting of cardiomyopathy  ?  I contacted the regional representative for?[employer] who helped send?the patients?device interrogation?from?day of admission.  Review of his interrogation revealed?multiple episodes of nonsustained VT and one episode of sustained VT, ATP x3?was attempted?but was unsuccessful?and then the patient received an appropriate shock.  In hospital, patient continues to have?several episodes of NSVT.  ?  Recommendations:  Increase metoprolol to 50mg daily and continue monitoring the tele.  Keep?potassium greater than 4 and mag greater than 2.  Suggest increasing his home?potassium to 20?meq twice daily.  Continue GDMT (entresto and sprironolactone).  Follow-up in cardiology clinic?within?2 weeks of discharge at which point?his?metoprolol?will likely need to be increased.  ?  We will follow with you.  ?  Dee Gomez MD  Cardiology  ?  ?   Problem List/Past Medical History  Ongoing  CAD S/P percutaneous coronary  angioplasty  HLD - Hyperlipidemia  HTN (hypertension)  Obesity  SVT - Supraventricular tachycardia  Systolic heart failure  VT (ventricular tachycardia)  Historical  Defibrillator, device  Defibrillator, device  Procedure/Surgical History  Catheter placement in coronary artery(s) for coronary angiography, including intraprocedural injection(s) for coronary angiography, imaging supervision and interpretation; with left heart catheterization including intraprocedural injection(s) for left fernando (02/08/2021)  Fluoroscopy of Left Heart using Low Osmolar Contrast (02/08/2021)  Fluoroscopy of Multiple Coronary Arteries using Low Osmolar Contrast (02/08/2021)  Intravascular Doppler velocity and/or pressure derived coronary flow reserve measurement (coronary vessel or graft) during coronary angiography including pharmacologically induced stress; initial vessel (List separately in addition to code for primary pro (02/08/2021)  Measurement of Arterial Pressure, Coronary, Percutaneous Approach (02/08/2021)  Measurement of Arterial Pressure, Coronary, Percutaneous Approach (02/08/2021)  Measurement of Cardiac Sampling and Pressure, Left Heart, Percutaneous Approach (02/08/2021)  Insertion of Defibrillator Generator into Chest Subcutaneous Tissue and Fascia, Open Approach (09/12/2018)  Removal of Cardiac Rhythm Related Device from Trunk Subcutaneous Tissue and Fascia, Open Approach (09/12/2018)  Removal of implantable defibrillator pulse generator with replacement of implantable defibrillator pulse generator; dual lead system (09/12/2018)  Catheter placement in coronary artery(s) for coronary angiography, including intraprocedural injection(s) for coronary angiography, imaging supervision and interpretation; with left heart catheterization including intraprocedural injection(s) for left fernando (08/29/2018)  Fluoroscopy of Left Heart using Low Osmolar Contrast (08/29/2018)  Fluoroscopy of Multiple Coronary Arteries using Low Osmolar  Contrast (08/29/2018)  Measurement of Cardiac Sampling and Pressure, Left Heart, Percutaneous Approach (08/29/2018)  Drainage of Tonsils with Drainage Device, Open Approach (03/18/2016)  Incision & Drainage (Left, Neck) (03/18/2016)  Inspection of Neck, Open Approach (03/18/2016)  Defibrillator, device (06/15/2011)  ICD - Internal cardiac defibrillator procedure   Medications  Inpatient  acetaminophen, 650 mg= 2 tab(s), Oral, q6hr, PRN  bumetanide, 2 mg= 2 tab(s), Oral, BID  clopidogrel 75 mg oral tablet, 75 mg= 1 tab(s), Oral, Daily  DuoNeb 0.5 mg-2.5 mg/3 mL inhalation solution, 3 mL, NEB, q6hr Resp, PRN  Entresto 24 mg-26 mg oral tablet, 1 tab, Oral, BID  levothyroxine 75 mcg (0.075 mg) oral tablet, 0.0375 mg= 0.5 tab(s), Oral, Daily  metoprolol succinate 25 mg oral tablet, extended release, 50 mg= 2 tab(s), Oral, Daily  Protonix, 40 mg= 1 tab(s), Oral, Daily  Xarelto, 20 mg= 2 tab(s), Oral, Daily  Zofran, 4 mg= 2 mL, IV Push, q4hr, PRN  Home  Albuterol (Eqv-ProAir HFA) 90 mcg/inh inhalation aerosol, See Instructions  bumetanide 2 mg oral tablet, 2 mg= 1 tab(s), Oral, BID  clopidogrel 75 mg oral tablet, 75 mg= 1 tab(s), Oral, Daily, 11 refills  Entresto 24 mg-26 mg oral tablet, See Instructions  levothyroxine 75 mcg (0.075 mg) oral tablet, 0.038 mg= 0.5 tab(s), Oral, Daily  metoprolol succinate 25 mg oral tablet, extended release, 50 mg= 2 tab(s), Oral, Daily  potassium chloride 10 mEq oral tablet, extended release, 10 mEq= 1 tab(s), Oral, BID, 3 refills  spironolactone 25 mg oral tablet, See Instructions, 11 refills  Vitamin D, 1 tab, Oral, Daily  Xarelto 20mg Tablet, 20 mg= 1 tab(s), Oral, Daily, 11 refills  Allergies  Egg  influenza virus vaccine, inactivated?(Allergy to eggs., Allergy to eggs.)  Social History  Abuse/Neglect  No, 11/06/2021  No, No, Yes, 05/17/2021  Alcohol - Denies Alcohol Use, 03/11/2015  Past, 05/17/2021  Employment/School  Employed, Activity level: Moderate physical work. Highest  education level: High school. Operates hazardous equipment: No. Workplace hazards: Hazardous materials, Heavy lifting/twisting, Loud noises, Repetitive motion., 03/11/2015  Exercise  Self assessment: Good condition., 03/24/2015  Exercise duration: 48. Exercise frequency: Daily. Exercise type: Walking., 03/11/2015  Home/Environment  Lives with Alone, Children, Significant other. Living situation: Home/Independent. Home equipment: CPAP/BiPAP. Alcohol abuse in household: No. Substance abuse in household: No. Smoker in household: Yes. Injuries/Abuse/Neglect in household: No. Feels unsafe at home: No. Safe place to go: Yes. Family/Friends available for support: Yes. Concern for family members at home: No. Major illness in household: No. Financial concerns: Yes. TV/Computer concerns: No., 03/11/2015  Nutrition/Health  Coumadin., Caffeine intake amount: 2-3 cokes 12 ounce daily.. Wants to lose weight: No. Sleeping concerns: No. Feels highly stressed: No., 03/11/2015  Sexual  Gender Identity Identifies as male., 01/22/2019  Spiritual/Cultural  [place], 07/22/2021  Substance Use - Denies Substance Abuse, 03/11/2015  Past, 04/30/2018  Tobacco  Former smoker, quit more than 30 days ago, No, 11/06/2021  Former smoker, quit more than 30 days ago, Cigarettes, N/A, 07/22/2021  4 or less cigarettes(less than 1/4 pack)/day in last 30 days, No, 05/17/2021  Family History  Acute myocardial infarction.: Brother.  Cardiac arrest.: Brother.  Cardiovascular disease: Brother.  Heart disease: Mother, Father and Brother.  Primary malignant neoplasm of brain: Brother.  Immunizations  Vaccine Date Status   pneumococcal vacc 10/29/2010 Recorded

## 2022-05-03 NOTE — HISTORICAL [facility] C[facility]ER
This is a historical note converted from Marcin. Formatting and pictures may have been removed.  Please reference Marcin for original formatting and attached multimedia. Chief Complaint  Pt reports SOB and a cough for 1 week. Bilateral wheezing noted and in no obvious respiratory distress.  History of Present Illness  60-year-old??male with a?PMH?of nonischemic cardiomyopathy, heart failure reduced ejection fraction of 10% with AICD placed, history of V. tach, SVT, A. fib on Coumadin and Amiodarone. ?He complained of 1 week?hx?of shortness of breath and non-productive cough that has gotten progressively worse. ?He endorses orthopnea, cannot lay flat due to shortness of breath.?He?endorses that?sitting up relieves his symptoms. ?He also complains of lower leg extremity that has occurred since last Tuesday. ?He complains of back pain which is chronic.??Patient?denies COVID-19 exposure stating that he never leaves his house. ?Of note,??he was referred to [facility] for CABG evaluation. ?Given his low ejection fraction which is believed to be disproportionate to coronary artery disease, the patient is not considered a surgical candidate at this time. ?The decision was made to send to [facility] for protected PCI with [facility] support page. ?Patient stated that he ended up not doing PCI procedure because he worried?about not surviving?the PCI procedure.??He is currently on maximum tolerated medical therapy with Coreg 25 mg, Entresto 24/26 mg, Aldactone 25 mg, Lasix 20 mg and aspirin 81 mg daily. ?He is also on Coumadin for his A. fib. ?His last interrogation of AICD on 2/21 was normal with no recent events. He continues to smoke half pack of cigarettes per day and has not decided yet to quit. ?He denies drinking alcohol recently stated that he quit 10 years ago. ?He denies illicit substance use.  ?   PMH:CHF, nonischemic cardiomyopathy, NYHA class III as opposed ICD placement, history of V. tach on amiodarone,  history of SYLVIA farfan on Coumadin, ejection fraction 10% based on echocardiogram on 12/20, recent angiogram 2/21 revealed 70% mid LAD stenosis, Rice RCA 70% stenosis.?  Home Medications: Albuterol inhaler, Amiodarone 20 mg, Aspirin 81 mg, Atorvastatin 80 mg, Coreg 25 mg, Entresto 24 mg - 26 mg, Lasix 20 mg, Potassium Chloride 10 mEq, Aldactone 25 mg, Vitamin D tablet, Warfarin 1 mg at bedtime  Allergies:Egg  PSH:LHC (08/2018), (02/08/21), Defibrillator?removal and insertion?(9/2018), Defibrillator (06/2011)  FH: Heart disease of Mother, Father and Brother  SH: Smokes 1/2 PPD, no alcohol or illicit drug use  Review of Systems  General: No fatigue, no weakness, no weight changes, no fever  HEENT:No head trauma, no vision changes, no hearing loss, no ear pain, no rhinorrhea, no sore throat  Cardiac: +SANFORD, no palpitations, +edema  Respiratory:?+ SOB,?+ cough, no sputum  GI: No n/v, no diarrhea, no constipation,?+ abdominal pain, no bleeding  MSK: No muscle weakness, no muscle pain, no swelling  Neurologic: No trembling, no loss of sensation, no numbness, no faints or blackouts, no seizures  Psychiatric:?No mood changes, no depression, no memory changes  ?  Physical Exam  Vitals & Measurements  T:?36.5? ?C (Oral)? TMIN:?36.5? ?C (Oral)? TMAX:?37? ?C (Oral)? HR:?80(Peripheral)? RR:?22? BP:?103/73? SpO2:?100%? WT:?80?kg?  General: mildly uncomfortable, saturating 100% on room air  HEENT: EOMI, MMM  Neck: no masses  Heart: RRR, no murmurs, no rubs, no gallops  Lungs: regular chest symmetry with respirations, no?wheezes, no?crackles  Abdomen: mildly distended but soft, no scars, normal bowel sounds, no tenderness, trace edema of abdomen  MSK: no muscle atrophy, no?weakness, normal joint range of motion, no swelling  Neurologic: no focal neurological deficit  Extremities: +trace edema from thigh down to shin  Assessment/Plan  CHF Exacerbation  History of?CHF EF 10%, NYHA class III, ischemic cardiomyopathy, AICD, hx SVT, hx  VT  BMP for 4,914  Troponin 0.015, EKG: AV paced rhythm, prolonged QT interval 599 ms  CXR-No acute cardiopulmonary process  Lasix 40 mg IV given by ED physician  Continue home Amiodarone and Entresto. ?Hold Coreg?and Spironolactone because of softer blood pressure.  Daily weight. strict intake and output  Continue cardiac diet  Gentle diurese as needed  ?  Acute Kidney Injury  Baseline creatinine 0.80  BUN 28.9 Cr 1.76  Avoid nephrotoxic agents  ?  Hyperkalemia  K+ 5.3  Manage with Lasix  Repeat BMP  ?  Prolonged QT interval  EKG:  ms  Avoid?prolong QT medications  ?  CAD s/p AICD placement  INR 2.03  Continue Warfarin 1 mg bedtime  ?   Hx of Hypertension  /70 mm hg  Continue Entresto; hold Coreg and Aldactone  ?  Hx of HLD  Continue Lipitor 80 mg daily  ?  ?  Diet: Cardiac  Code status: Full code  PPX: Warfarin  Dispo: Admit for CHF exacerbation and?SANJAY  ?  ----------------------------------------------------------------------------------------------------------------------  ?  HO2 addendum  ?  Patient seen and examined with Dr. Crespo.? Agreed with above.  ?  Mr. Garcia is a 60-year-old  male with PMH of nonischemic cardiomyopathy, HFrEF (10%) s/p ICD, and A. fib on warfarin and amiodarone;.? He reports shortness of breath and dry cough that got worse over the last week.? Also have orthopnea along with lower extremity swelling.? Patient was referred to [facility] for high risk protected PCI and seen them on 3/19/2021 however patient did not undergo procedure due to the risks of the procedure including death.? Patient did not take any medication this morning.  ?  Labs and imaging reviewed and remarkable for BNP of 4,914 which is an increase from 3818 four months ago. SANJAY creatinine 1.76, hyperkalemia potassium 5.3, and prolonged .  ?  Physical exam significant for bilateral lower extremity trace edema up to shin.  ?  2/8/2021 Coronary Angiogram: Significant 2 vessel disease. iFR  positive mLAD  ?  Assessment  HF exacerbation - HFrEF (10%) s/p ICD  SANJAY  Hypokalemia  Prolonged QT  ?  Plan  Was given Lasix IV 40 mg in the ED - will continue to diurese as indicated  Continue home medication of amiodarone 200 mg daily, aspirin 81 daily, atorvastatin 80 mg daily, carvedilol 50 mg twice daily, Entresto twice daily, spironolactone 25 daily, and warfarin daily.  I have reviewed the patients history, residents? findings on physical examination, diagnosis and treatment plan. Care provided was reasonable and necessary.60 yr with hx of CAD, CHF,on CABG list, S/P AICD presents with CHF. on maximal medical therpay.?  ?  ?  ?  ?  ?   Problem List/Past Medical History  Ongoing  HLD - Hyperlipidemia  HTN (hypertension)  Obesity  SVT - Supraventricular tachycardia  Systolic heart failure  VT (ventricular tachycardia)  Historical  Defibrillator, device  Defibrillator, device  Procedure/Surgical History  Catheter placement in coronary artery(s) for coronary angiography, including intraprocedural injection(s) for coronary angiography, imaging supervision and interpretation; with left heart catheterization including intraprocedural injection(s) for left fernando (02/08/2021)  Removal of Cardiac Rhythm Related Device from Trunk Subcutaneous Tissue and Fascia, Open Approach (09/12/2018)  Catheter placement in coronary artery(s) for coronary angiography, including intraprocedural injection(s) for coronary angiography, imaging supervision and interpretation; with left heart catheterization including intraprocedural injection(s) for left fernando (08/29/2018)  Drainage of Tonsils with Drainage Device, Open Approach (03/18/2016)  Incision & Drainage (Left, Neck) (03/18/2016)  Defibrillator, device (06/15/2011)  ICD - Internal cardiac defibrillator procedure   Medications  Inpatient  albuterol CFC free 90 mcg/inh inhalation aerosol with adapter, 90 mcg= 1 puff(s), INH, q4hr, PRN  amiodarone 200 mg oral Tab, See Instructions, Oral,  Daily  aspirin 81 mg oral tablet, CHEWABLE, 81 mg= 1 tab(s), Oral, Daily  atorvastatin 20 mg oral tablet, 80 mg= 4 tab(s), Oral, Daily  carvedilol, 50 mg= 4 tab(s), Oral, BID  Entresto 24 mg-26 mg oral tablet, 1 tab(s), Oral, BID  spironolactone 25 mg oral tablet, 25 mg= 1 tab(s), Oral, Daily  warfarin, 1 mg= 1 tab(s), Oral, At Bedtime  Home  Albuterol (Eqv-ProAir HFA) 90 mcg/inh inhalation aerosol, See Instructions  amiodarone 200 mg oral Tab, See Instructions  aspirin 81 mg oral tablet, 81 mg= 1 tab(s), Oral, Daily, 3 refills  atorvastatin 80 mg oral tablet, 80 mg= 1 tab(s), Oral, Daily, 3 refills  carvedilol 25 mg oral tablet, See Instructions  Entresto 24 mg-26 mg oral tablet, 1 tab(s), Oral, BID, 11 refills  Lasix 20 mg oral tablet, 20 mg= 1 tab(s), Oral, Daily, 2 refills  potassium chloride 10 mEq oral tablet, extended release, 10 mEq= 1 tab(s), Oral, BID, 2 refills  spironolactone 25 mg oral tablet, 25 mg= 1 tab(s), Oral, Daily, 2 refills  Vitamin D, 1 tab, Oral, Daily  warfarin 1 mg oral tablet, 1 mg= 1 tab(s), Oral, At Bedtime  warfarin 2 mg oral tablet, 2 mg= 1 tab(s), Oral, At Bedtime, 1 refills,? ?Not taking: duplicate  warfarin 2 mg oral tablet, 2 mg= 1 tab(s), Oral, At Bedtime, 2 refills,? ?Not Taking per Prescriber  warfarin 2 mg oral tablet, 2 mg= 1 tab(s), Oral, At Bedtime, 1 refills,? ?Not Taking per Prescriber: dose changed to 1 mg per day  Allergies  Egg  influenza virus vaccine, inactivated?(Allergy to eggs., Allergy to eggs.)  Social History  Abuse/Neglect  No, No, Yes, 04/26/2021  No, 04/20/2021  No, 02/24/2021  No, No, Yes, 02/08/2021  Alcohol - Denies Alcohol Use, 03/11/2015  Past, 04/30/2018  Employment/School  Employed, Activity level: Moderate physical work. Highest education level: High school. Operates hazardous equipment: No. Workplace hazards: Hazardous materials, Heavy lifting/twisting, Loud noises, Repetitive motion., 03/11/2015  Exercise  Self assessment: Good condition.,  03/24/2015  Exercise duration: 48. Exercise frequency: Daily. Exercise type: Walking., 03/11/2015  Home/Environment  Lives with Alone, Children, Significant other. Living situation: Home/Independent. Home equipment: CPAP/BiPAP. Alcohol abuse in household: No. Substance abuse in household: No. Smoker in household: Yes. Injuries/Abuse/Neglect in household: No. Feels unsafe at home: No. Safe place to go: Yes. Family/Friends available for support: Yes. Concern for family members at home: No. Major illness in household: No. Financial concerns: Yes. TV/Computer concerns: No., 03/11/2015  Nutrition/Health  Coumadin., Caffeine intake amount: 2-3 cokes 12 ounce daily.. Wants to lose weight: No. Sleeping concerns: No. Feels highly stressed: No., 03/11/2015  Sexual  Gender Identity Identifies as male., 01/22/2019  Substance Use - Denies Substance Abuse, 03/11/2015  Past, 04/30/2018  Tobacco  10 or more cigarettes (1/2 pack or more)/day in last 30 days, No, 04/26/2021  4 or less cigarettes(less than 1/4 pack)/day in last 30 days, Yes, 04/20/2021  Cigars or pipes daily within last 30 days, No, 02/24/2021  10 or more cigarettes (1/2 pack or more)/day in last 30 days, No, 02/08/2021  Family History  Acute myocardial infarction.: Brother.  Cardiac arrest.: Brother.  Cardiovascular disease: Brother.  Heart disease: Mother, Father and Brother.  Primary malignant neoplasm of brain: Brother.  Immunizations  Vaccine Date Status   pneumococcal vacc 10/29/2010 Recorded   Lab Results  Labs Last 24 Hours?  ?Chemistry? Hematology/Coagulation?   Sodium Lvl:?133 mmol/L?Low (04/26/21 08:44:00) PT:?22.6 second(s)?High (04/26/21 08:44:00)   Potassium Lvl:?5.3 mmol/L?High (04/26/21 08:44:00) INR:?2.04?High (04/26/21 08:44:00)   Chloride: 100 mmol/L (04/26/21 08:44:00) PTT: 33.2 second(s) (04/26/21 08:44:00)   CO2: 26 mmol/L (04/26/21 08:44:00) WBC: 7.4 x10(3)/mcL (04/26/21 08:44:00)   Calcium Lvl: 9.1 mg/dL (04/26/21 08:44:00) RBC:?4.47  x10(6)/mcL?Low (04/26/21 08:44:00)   Glucose Lvl:?123 mg/dL?High (04/26/21 08:44:00) Hgb:?11.3 gm/dL?Low (04/26/21 08:44:00)   BUN:?28.9 mg/dL?High (04/26/21 08:44:00) Hct:?37.8 %?Low (04/26/21 08:44:00)   Creatinine:?1.76 mg/dL?High (04/26/21 08:44:00) Platelet: 258 x10(3)/mcL (04/26/21 08:44:00)   Est Creat Clearance Ser: 42.15 mL/min (04/26/21 09:28:15) MCV: 84.6 fL (04/26/21 08:44:00)   eGFR-AA:?51?Low (04/26/21 08:44:00) MCH:?25.3 pg?Low (04/26/21 08:44:00)   eGFR-NGOC:?42 mL/min/1.73 m2?Low (04/26/21 08:44:00) MCHC:?29.9 gm/dL?Low (04/26/21 08:44:00)   Bili Total: 1.2 mg/dL (04/26/21 08:44:00) RDW:?17.2 %?High (04/26/21 08:44:00)   Bili Direct:?0.6 mg/dL?High (04/26/21 08:44:00) MPV: 10.4 fL (04/26/21 08:44:00)   Bili Indirect: 0.6 mg/dL (04/26/21 08:44:00) Abs Neut: 5.49 x10(3)/mcL (04/26/21 08:44:00)   AST: 29 unit/L (04/26/21 08:44:00) Neutro Auto: 75 % (04/26/21 08:44:00)   ALT: 46 unit/L (04/26/21 08:44:00) Lymph Auto: 12 % (04/26/21 08:44:00)   Alk Phos: 110 unit/L (04/26/21 08:44:00) Mono Auto: 10 % (04/26/21 08:44:00)   Total Protein: 6.4 gm/dL (04/26/21 08:44:00) Eos Auto: 2 % (04/26/21 08:44:00)   Albumin Lvl: 3.8 gm/dL (04/26/21 08:44:00) Abs Eos: 0.2 x10(3)/mcL (04/26/21 08:44:00)   Globulin: 2.6 gm/dL (04/26/21 08:44:00) Basophil Auto: 1 % (04/26/21 08:44:00)   A/G Ratio: 1.5 ratio (04/26/21 08:44:00) Abs Neutro: 5.49 x10(3)/mcL (04/26/21 08:44:00)   BNP:?4914.3 pg/mL?High (04/26/21 08:44:00) Abs Lymph: 0.9 x10(3)/mcL (04/26/21 08:44:00)   Troponin-I: 0.015 ng/mL (04/26/21 08:44:00) Abs Mono: 0.7 x10(3)/mcL (04/26/21 08:44:00)    Abs Baso: 0.1 x10(3)/mcL (04/26/21 08:44:00)    IG%: 0 % (04/26/21 08:44:00)    IG#: 0.02 (04/26/21 08:44:00)   Diagnostic Results  Accession:?[id number]  Order:?XR Chest 1 View  Report Dt/Tm:?04/26/2021 09:34  Report:?  ?  CLINICAL: ?Chest pain.  ?  COMPARISON: December 9, 2020.  ?  FINDINGS: ?Cardiopericardial silhouette enlargement is similar. Left  chest implanted  cardiac device electrodes terminate within the right  atrium and the right ventricle. ?Lungs are without dense focal or  segmental consolidation, congestion, pleural effusion or pneumothorax.  Old deformity of the right clavicle.  ?  IMPRESSION:  ?  No acute cardiopulmonary process identified.  ?

## 2022-05-14 NOTE — OP NOTE
DATE OF SURGERY:        SURGEON:  Kings Grady MD    attending physician:  Dr. Kings Grady MD    PREOPERATIVE DIAGNOSIS:  Elevated prostate-specific antigen, 5.88.    POSTOPERATIVE DIAGNOSIS:  Elevated prostate-specific antigen, 5.88.    PROCEDURE:    1. Prostate ultrasound.  2. Ultrasound-guided biopsy.  3. Prostate biopsy.    ANESTHESIA:  General.    COMPLICATIONS:  None.    BLOOD LOSS:  Minimal.    FINDINGS:  A 52 cc gland with increased echogenicity throughout the transitional zone.  No suspicious hypoechoic areas.  Seminal vesicles nondilated.  He tolerated procedure well.  HE was taken recovery room afterward in stable condition.    PROCEDURE IN DETAIL:  A 61-year-old male with elevated PSA, 5.88.  Recommended that he undergo biopsy.  He has significant coronary artery disease and congestive failure.  He was cleared by Cardiology.  He held his Plavix.  He was taken to the operative, placed with the right flank up.  Underwent satisfactory anesthesia.  An 8 megahertz ultrasound probe was inserted in his rectal vault.  His prostate measured 52 cc at mid gland.  Seminal vesicles were nondilated.  There were no suspicious hypoechoic lesions.  He had diffuse hyperechoic areas throughout the transitional zone.  Sagittal sectioning was carried out and he underwent biopsies in sextant fashion, 2 in each base and mid and apex.  He tolerated the procedure well with minimal bleeding.  He was brought back to recovery room in stable condition.  He will be given a postbiopsy instruction sheet for items to call for, along with a prescription of Omnicef.  He will     be seen back in the clinic in 1 week, sooner should he have problems.  He is aware if he has temperature over 101, he is to report back to the emergency room for IV antibiotic therapy.        ______________________________  Kings Grady MD    TAB/MODL  DD:  04/19/2022  Time:  10:18AM  DT:  04/19/2022  Time:  10:36AM  Job #:  [id number]

## 2022-05-14 NOTE — DISCHARGE SUMMARY
Admit and Discharge Dates  Admit Date: 12/16/2021  Discharge Date: 12/17/2021  Physicians  Attending Physician - Theo SOTELO, Travis BARTLETT  Admitting Physician - Theo SOTELO, Travis BARTLETT  Consulting Physician - Dee Gomez MD  Primary Care Physician - Sam Rosa DO  Discharge Diagnosis  Ischemic cardiomyopathy  Coronary artery disease  Atrial fibrillation with rapid ventricular response  Surgical Procedures  No procedures recorded for this visit.  Immunizations  No immunizations recorded for this visit.  Admission Information  61-year-old male with?PMH significant for?CAD s/p PCI to LAD 5/7/21, CHF?s/p AICD placement?9/2018 (EF of 10%?on TTE 5/3/2021),?paroxysmal A. fib,?hyperlipidemia, subclinical hypothyroidism,?who presents?to the ED?after he was shocked by his defibrillator. ?Patient states he was playing pool and his defibrillator shocked him. ?He did not notice any symptoms prior to the?shock. ?He denies having any chest pain at that time,?palpitations,?tach, diaphoresis, nausea, vomiting.??He reports?that he was hospitalized?for an AICD shock?1 month ago,?and reports that he was also asymptomatic then.? Patient reports taking all his medications as prescribed, not missing any dosages, including all his GDMT?and levothyroxine.? He denies any recent weight changes, cold or heat intolerance, myalgias,?diarrhea, or constipation.?? He has quit smoking a few months ago after his?stent placement, though has a 44-pack-year smoking history.? He only drinks alcohol occasionally, denies any illicit drug use. His ICD was interrogated?in the ED, which revealed that the patient was?in A. fib with RVR?at the time of his shock at a rate in the 170s.??Patient was loaded with aspirin 325 mg.? Troponins?trended x2 which were negative. ?EKG?upon admission revealed normal sinus rhythm?with age-indeterminate inferior and septal infarct. ?He was admitted to?telemetry?for observation.  Hospital Course  Patient was admitted for  observation?after he received an AICD shock.? Initial troponin was negative,?EKG obtained on admission?revealed no significant change from?EKG 1 month ago.? AICD was interrogated which revealed?the patient was in A. fib/RVR at a rate in the 170s at the time of his shock.??BNP was elevated, but the patient was euvolemic on examination.??Troponin?and EKG?is repeated in the morning, troponin was negative and his EKG?was stable.? Thyroid studies were also obtained, revealing elevated free T4 at 1.95 and a suppressed TSH at?<0.0083.? It is likely that a combination of the patients?very?poor?EF?and hypothyroidism?may be causing the patients?dysrhythmias.? The case was run by cardiology, who recommended that the patient continue all his?current?medications as currently?prescribed, with the exception of?his levothyroxine which he should discontinue. The patient was chest pain free and hemodynamically stable for his entire hospitalization.? He was discharged home, to follow-up in post wards clinic?in about 2 weeks (with repeat TFTs), as well as cardiology in 1 month.  Significant Findings  EKG?revealing normal sinus rhythm with PVCs,?T wave inversions?in the inferior leads?and leads V3-V6, unchanged from EKG 1 month ago  Troponins?x2 negative  TTE (formal read pending)?though preliminary read reveals EF of 5-10%, no significant change from TTE?from May 2021  BNP 1057,?but patient was?euvolemic on examination  Free T4 1.95  TSH <0.0083  Time Spent on discharge  >35 minutes  Objective  Vitals & Measurements  T:?37.0? ?C (Oral)? TMIN:?37.0? ?C (Oral)? TMAX:?37.2? ?C (Oral)? HR:?81(Peripheral)? HR:?88(Monitored)? RR:?15? BP:?99/60? SpO2:?96%? WT:?75?kg? BMI:?25.05?  Physical Exam  General: Well-appearing, well-nourished? man lying in bed in no acute distress  Eye: PERRL, EOMI  HENT: Normocephalic, atraumatic, moist mucous membranes  Neck: Supple, nontender, no JVD  Respiratory: Scattered expiratory wheezing bilaterally.  ?Normal work of breathing on room air  Cardiovascular: Regular rate and rhythm, no murmurs, rubs, or gallops. ?2+ radial pulses.? No peripheral edema  Gastrointestinal:?Soft, nontender, nondistended  Musculoskeletal:?Moves all extremities with full range of motion.? No deformities  Integumentary:?Warm, dry, intact. No rashes  Neurologic:?Alert and oriented x3. Normal speech. No gross deficits  Psychiatric:?Appropriate mood and affect  Patient Discharge Condition  Clinically and hemodynamically stable for discharge  Discharge Disposition  Mr.Shannon Garcia?is being discharged?home.  ?   Activity:?Return to normal activity.  Diet:?Cardiac Diet  ?  Medications:  -Rx provided for Spironolactone 12.5 mg daily for 30 days with 11 refills, sent to his pharmacy. ?Patient had run out of his medication  -Discontinue levothyroxine until seen and reevaluated by PCP?  ?   Follow Ups:  -To be seen in [facility] in roughly 2 weeks on 12/27 by Dr. Duran  -To follow-up?in cardiology clinic in 1 month  -The following labs are to be drawn at the [facility] visit: TSH, Free T4  ?  ?   The above information was discussed with?the patient?in clear terms.?He?was?able to repeat the instructions to me in?his?own words. All questions answered. ED precautions provided.?   Discharge Medication Reconciliation  Continue  albuterol (Albuterol (Eqv-ProAir HFA) 90 mcg/inh inhalation aerosol)?See Instructions  bumetanide (bumetanide 2 mg oral tablet)?2 mg, Oral, BID  clopidogrel (clopidogrel 75 mg oral tablet)?75 mg, Oral, Daily  ergocalciferol (Vitamin D)?1 tab, Oral, Daily  metoprolol (metoprolol succinate 50 mg oral capsule, extended release)?50 mg, Oral, BID  potassium chloride (potassium chloride 10 mEq oral tablet, extended release)?20 mEq, Oral, BID  rivaroxaban (Xarelto 20mg Tablet)?20 mg, Oral, Daily  sacubitril-valsartan (Entresto 24 mg-26 mg oral tablet)?See Instructions  spironolactone (spironolactone 25 mg oral tablet)?See  Instructions  Discontinue  levothyroxine (levothyroxine 50 mcg (0.05 mg) oral tablet)?50 mcg, Oral, Daily  levothyroxine (levothyroxine 75 mcg (0.075 mg) oral tablet)?0.038 mg, Oral, Daily  Education and Orders Provided  Heart Failure  Coronary Artery Disease, Male  Discharge - 12/17/21 13:28:00 CST, Home?  Follow up  Report to Emergency Department if symptoms return or worsen  [facility]  ????Follow up in [facility] clinic with Dr. Duran in 2 weeks 12/27  [facility] Cardiology Clinic  ????Roughly 1 month follow up  [employer]  No Qualifying Data

## 2022-06-14 RX ORDER — ALBUTEROL SULFATE 90 UG/1
2 AEROSOL, METERED RESPIRATORY (INHALATION) EVERY 4 HOURS PRN
COMMUNITY
Start: 2022-01-06 | End: 2022-06-14 | Stop reason: SDUPTHER

## 2022-06-14 NOTE — TELEPHONE ENCOUNTER
----- Message from Jeannie Matos sent at 6/14/2022 11:21 AM CDT -----  Regarding: Dr. Shirley  Pt states that she has been out of her Albuterol pump for about a week now.and needs a refill to be sent to [facility] in [facility].  Please Advise.    Thanks

## 2022-06-15 RX ORDER — ALBUTEROL SULFATE 90 UG/1
2 AEROSOL, METERED RESPIRATORY (INHALATION) EVERY 4 HOURS PRN
Qty: 18 G | Refills: 11 | Status: SHIPPED | OUTPATIENT
Start: 2022-06-15 | End: 2023-06-06 | Stop reason: SDUPTHER

## 2022-06-21 ENCOUNTER — LAB VISIT (OUTPATIENT)
Dept: LAB | Facility: HOSPITAL | Age: 62
End: 2022-06-21
Attending: INTERNAL MEDICINE
Payer: MEDICAID

## 2022-06-21 ENCOUNTER — OFFICE VISIT (OUTPATIENT)
Dept: CARDIOLOGY | Facility: CLINIC | Age: 62
End: 2022-06-21
Payer: MEDICAID

## 2022-06-21 ENCOUNTER — CLINICAL SUPPORT (OUTPATIENT)
Dept: CARDIOLOGY | Facility: CLINIC | Age: 62
End: 2022-06-21
Payer: MEDICAID

## 2022-06-21 VITALS
TEMPERATURE: 98 F | BODY MASS INDEX: 26.45 KG/M2 | DIASTOLIC BLOOD PRESSURE: 66 MMHG | HEART RATE: 70 BPM | WEIGHT: 158.75 LBS | SYSTOLIC BLOOD PRESSURE: 102 MMHG | RESPIRATION RATE: 20 BRPM | OXYGEN SATURATION: 100 % | HEIGHT: 65 IN

## 2022-06-21 DIAGNOSIS — I50.20 HFREF (HEART FAILURE WITH REDUCED EJECTION FRACTION): ICD-10-CM

## 2022-06-21 DIAGNOSIS — I48.20 CHRONIC ATRIAL FIBRILLATION: ICD-10-CM

## 2022-06-21 DIAGNOSIS — I42.8 NON-ISCHEMIC CARDIOMYOPATHY: ICD-10-CM

## 2022-06-21 DIAGNOSIS — I25.10 CORONARY ARTERY DISEASE, UNSPECIFIED VESSEL OR LESION TYPE, UNSPECIFIED WHETHER ANGINA PRESENT, UNSPECIFIED WHETHER NATIVE OR TRANSPLANTED HEART: ICD-10-CM

## 2022-06-21 PROBLEM — I48.91 ATRIAL FIBRILLATION: Status: ACTIVE | Noted: 2022-06-21

## 2022-06-21 LAB
ALBUMIN SERPL-MCNC: 4.3 GM/DL (ref 3.4–4.8)
ALBUMIN/GLOB SERPL: 1.2 RATIO (ref 1.1–2)
ALP SERPL-CCNC: 244 UNIT/L (ref 40–150)
ALT SERPL-CCNC: 19 UNIT/L (ref 0–55)
AST SERPL-CCNC: 21 UNIT/L (ref 5–34)
BILIRUBIN DIRECT+TOT PNL SERPL-MCNC: 0.9 MG/DL
BUN SERPL-MCNC: 13.8 MG/DL (ref 8.4–25.7)
CALCIUM SERPL-MCNC: 9.8 MG/DL (ref 8.8–10)
CHLORIDE SERPL-SCNC: 97 MMOL/L (ref 98–107)
CO2 SERPL-SCNC: 29 MMOL/L (ref 23–31)
CREAT SERPL-MCNC: 1.48 MG/DL (ref 0.73–1.18)
GLOBULIN SER-MCNC: 3.7 GM/DL (ref 2.4–3.5)
GLUCOSE SERPL-MCNC: 123 MG/DL (ref 82–115)
POTASSIUM SERPL-SCNC: 3.9 MMOL/L (ref 3.5–5.1)
PROT SERPL-MCNC: 8 GM/DL (ref 5.8–7.6)
SODIUM SERPL-SCNC: 137 MMOL/L (ref 136–145)
T4 FREE SERPL-MCNC: 1.08 NG/DL (ref 0.7–1.48)
TSH SERPL-ACNC: 11.18 UIU/ML (ref 0.35–4.94)

## 2022-06-21 PROCEDURE — 80053 COMPREHEN METABOLIC PANEL: CPT

## 2022-06-21 PROCEDURE — 84439 ASSAY OF FREE THYROXINE: CPT

## 2022-06-21 PROCEDURE — 99214 OFFICE O/P EST MOD 30 MIN: CPT | Mod: PBBFAC,27 | Performed by: INTERNAL MEDICINE

## 2022-06-21 PROCEDURE — 36415 COLL VENOUS BLD VENIPUNCTURE: CPT

## 2022-06-21 PROCEDURE — 84443 ASSAY THYROID STIM HORMONE: CPT

## 2022-06-21 PROCEDURE — 93283 PRGRMG EVAL IMPLANTABLE DFB: CPT | Mod: PBBFAC

## 2022-06-21 PROCEDURE — 99211 OFF/OP EST MAY X REQ PHY/QHP: CPT | Mod: PBBFAC

## 2022-06-21 RX ORDER — AMIODARONE HYDROCHLORIDE 200 MG/1
200 TABLET ORAL DAILY
COMMUNITY
Start: 2022-05-30 | End: 2022-07-12 | Stop reason: SDUPTHER

## 2022-06-21 RX ORDER — CLOPIDOGREL BISULFATE 75 MG/1
75 TABLET ORAL DAILY
COMMUNITY
End: 2023-01-24 | Stop reason: SDUPTHER

## 2022-06-21 RX ORDER — CHOLECALCIFEROL (VITAMIN D3) 25 MCG
1000 TABLET ORAL DAILY
COMMUNITY

## 2022-06-21 RX ORDER — SPIRONOLACTONE 25 MG/1
25 TABLET ORAL DAILY
COMMUNITY
End: 2023-01-24 | Stop reason: SDUPTHER

## 2022-06-21 RX ORDER — ALBUTEROL SULFATE 90 UG/1
2 AEROSOL, METERED RESPIRATORY (INHALATION) EVERY 4 HOURS
COMMUNITY
Start: 2022-04-03 | End: 2022-07-12 | Stop reason: SDUPTHER

## 2022-06-21 NOTE — PROGRESS NOTES
Cardiology Attending    I evaluated Mr. Nicole Garcia and discussed his symptoms, findings, and management plan with the resident.  I agree with the H&P and Assessment & Plan of the Cardiology Clinic Note.        Tk Woodard MD

## 2022-06-21 NOTE — PROGRESS NOTES
CHIEF COMPLAINT:   Chief Complaint   Patient presents with    3 month f/u denies chest pain/sob had device check this mor                   Review of patient's allergies indicates:   Allergen Reactions    Egg derived Anaphylaxis    Flu vac 2012 (18-64yrs)(pf)      Any thing with eggs   Stop breathing                                          HPI:  Nicole Garcia 61 y.o. male with PMH ischemic cardiomyopathy (EF 10%), Afib, HTN, HLD, CAD s/p PCI to LAD 5/2021 who is presenting to [facility] Cardiology clinic for 3-month follow up. At last visit he was started on amiodarone after having been shocked by his device for V-Tach. Since last visit, he has been asymptomatic. No device shocks. He walks about 100 yards without any symptoms. No CP, SOB, presyncope, BLE edema. No abdominal pain, n/v.                                                                                                                                                                                                                                                                                                                                                                                                                                                                                      CARDIAC TESTING:  PCI to LAD in May 2021    TTE 12/2022 - EF 10%     Results for orders placed in visit on 08/29/18    Past Surgical History:   Procedure Laterality Date    CARDIAC CATHETERIZATION       Social History     Socioeconomic History    Marital status:    Tobacco Use    Smoking status: Former Smoker    Smokeless tobacco: Never Used   Substance and Sexual Activity    Alcohol use: Yes     Comment: weekly liquor    Drug use: Not Currently        Family History   Problem Relation Age of Onset    Heart disease Mother     Stroke Mother     Heart attack Mother     Heart failure Mother     Heart disease Father     Stroke Father     Heart attack Father   "   Heart failure Father     Heart failure Sister     Heart disease Brother     Stroke Brother     Heart failure Brother     Arrhythmia Brother          Current Outpatient Medications:     albuterol (PROVENTIL/VENTOLIN HFA) 90 mcg/actuation inhaler, Inhale 2 puffs into the lungs every 4 (four) hours as needed for Wheezing., Disp: 18 g, Rfl: 11    amiodarone (PACERONE) 200 MG Tab, Take 200 mg by mouth once daily., Disp: , Rfl:     atorvastatin (LIPITOR) 80 MG tablet, Take 1 tablet (80 mg total) by mouth once daily., Disp: 30 tablet, Rfl: 6    clopidogreL (PLAVIX) 75 mg tablet, Take 75 mg by mouth once daily., Disp: , Rfl:     metoprolol succinate 50 mg CSpX, Take 50 mg by mouth., Disp: , Rfl:     POTASSIUM CHLORIDE ORAL, Take 10 mEq by mouth 2 (two) times daily., Disp: , Rfl:     sacubitriL-valsartan (ENTRESTO) 24-26 mg per tablet, Take by mouth., Disp: , Rfl:     spironolactone (ALDACTONE) 25 MG tablet, Take 25 mg by mouth once daily., Disp: , Rfl:     vitamin D (VITAMIN D3) 1000 units Tab, Take 1,000 Units by mouth once daily., Disp: , Rfl:     albuterol (PROVENTIL/VENTOLIN HFA) 90 mcg/actuation inhaler, Inhale 2 puffs into the lungs every 4 (four) hours., Disp: , Rfl:    Xarelto 20mg daily    ROS:                                                                                                                                                                             ROS     Blood pressure 102/66, pulse 70, temperature 98.1 °F (36.7 °C), temperature source Oral, resp. rate 20, height 5' 5.35" (1.66 m), weight 72 kg (158 lb 11.7 oz), SpO2 100 %.     PE:  Physical Exam     General - Appears comfortable, appropriatley conversive   Mental Status - alert and oriented x 3, speaking in logical, relevant sentences   HEENT - no rhinorrhea, no JVD seen   Cardiac - RRR, no murmurs, rubs, or gallops; no edema in LE   Respiratory - breathing comfortably; clear to ascultation bilaterally   Abdominal - " nondistended, soft, nontender to palpation   Extremities - LE, UE, and joints are nonerythematous and nonswollen   Skin - no rashes or bruises seen on skin        ASSESSMENT/PLAN:    Ischemic Cardiomyopathy EF 10%  -continue metoprolol succinate 50mg BID, Bumex 2mg BID, Entresto 24/26, Aldactone  -Symptoms well-controlled    CAD s/p PCI to LAD May 2021  -continue atorvastatin 80mg daily, Plavix, metoprolol succinate, Entresto    Afib  -continue metoprolol 50mg BID, amiodarone 200mg daily, Xarelto 20mg daily    Hx V-Tach  -continue amiodarone 200mg daily    High-Risk Meidcation Use  -has not had LFT's, TFT's, CXR since starting amiodarone  -will order CMP, TFT's, CXR    HTN, HLD  -LDL above goal.   -F/u with PCP    Hx Hypothyroidism  -synthroid held during December hospitalization due to elevated T4 levels. Hasn't been restarted  -Now TSH elevated. Will contact PCP regarding restarting synthroid.    Albert Michel PGY 2

## 2022-07-08 RX ORDER — ASPIRIN 81 MG/1
81 TABLET ORAL DAILY
COMMUNITY
End: 2022-10-03 | Stop reason: ALTCHOICE

## 2022-07-08 RX ORDER — BUMETANIDE 2 MG/1
1 TABLET ORAL 2 TIMES DAILY
COMMUNITY
Start: 2022-05-25 | End: 2023-01-24 | Stop reason: SDUPTHER

## 2022-07-08 RX ORDER — CEFDINIR 300 MG/1
300 CAPSULE ORAL EVERY 12 HOURS
COMMUNITY
Start: 2022-04-19 | End: 2022-07-12 | Stop reason: ALTCHOICE

## 2022-07-08 RX ORDER — ERGOCALCIFEROL 1.25 MG/1
1 CAPSULE ORAL
COMMUNITY

## 2022-07-08 RX ORDER — LEVOTHYROXINE SODIUM 75 UG/1
37.5 TABLET ORAL DAILY
COMMUNITY
Start: 2022-02-24 | End: 2022-07-12 | Stop reason: SDUPTHER

## 2022-07-12 ENCOUNTER — OFFICE VISIT (OUTPATIENT)
Dept: INTERNAL MEDICINE | Facility: CLINIC | Age: 62
End: 2022-07-12
Payer: MEDICAID

## 2022-07-12 VITALS
HEIGHT: 65 IN | DIASTOLIC BLOOD PRESSURE: 82 MMHG | WEIGHT: 158.63 LBS | SYSTOLIC BLOOD PRESSURE: 121 MMHG | BODY MASS INDEX: 26.43 KG/M2 | RESPIRATION RATE: 18 BRPM | HEART RATE: 71 BPM | TEMPERATURE: 98 F

## 2022-07-12 DIAGNOSIS — E78.5 HYPERLIPIDEMIA, UNSPECIFIED HYPERLIPIDEMIA TYPE: Chronic | ICD-10-CM

## 2022-07-12 DIAGNOSIS — E03.9 ACQUIRED HYPOTHYROIDISM: Chronic | ICD-10-CM

## 2022-07-12 DIAGNOSIS — Z87.891 FORMER SMOKER: Chronic | ICD-10-CM

## 2022-07-12 PROCEDURE — 99214 OFFICE O/P EST MOD 30 MIN: CPT | Mod: PBBFAC

## 2022-07-12 RX ORDER — LEVOTHYROXINE SODIUM 75 UG/1
37.5 TABLET ORAL DAILY
Qty: 90 TABLET | Refills: 3 | Status: SHIPPED | OUTPATIENT
Start: 2022-07-12 | End: 2023-04-17

## 2022-07-12 RX ORDER — AMIODARONE HYDROCHLORIDE 200 MG/1
200 TABLET ORAL DAILY
Qty: 90 TABLET | Refills: 3 | Status: SHIPPED | OUTPATIENT
Start: 2022-07-12 | End: 2023-01-24 | Stop reason: SDUPTHER

## 2022-07-12 NOTE — PROGRESS NOTES
"[facility] Clinic Visit    Chief Complaint:      Follow-up and Medication Refill     Subjective:     HPI:  Nicole Garcia is a 61 year old male with PMH of HFrEF w/ EF 10% 2/2 ICMO, CAD with hx of stent x1 to LAD in June 2021, HLD, HTN, A-fib, former smoker who is returning to clinic for follow up. He has no complaints today, denies chest pain, shortness of breath, palpitations, orthopnea, SANFORD; only concern is medication refills. Recently was off his Synthroid, TSH was elevated and being restarted on synthroid today. Appears to be doing well overall.        Review of Systems  Constitutional: no fever, fatigue, weakness  Eye: no vision loss, eye redness, drainage, or pain  ENMT: no sore throat, ear pain, sinus pain/congestion, nasal congestion/drainage  Respiratory: no cough, no wheezing, no shortness of breath  Cardiovascular: no chest pain, no palpitations, no edema  Gastrointestinal: no nausea, vomiting, or diarrhea. No abdominal pain  Genitourinary: no dysuria, no urinary frequency or urgency, no hematuria  Hema/Lymph: no abnormal bruising or bleeding  Endocrine: no heat or cold intolerance, no excessive thirst or excessive urination  Musculoskeletal: no muscle or joint pain, no joint swelling  Integumentary: no skin rash or abnormal lesion  Neurologic: no headache, no dizziness, no weakness or numbness        Objective:   Last 24 Hour Vital Signs:  Vitals  BP: 121/82  Temp: 97.9 °F (36.6 °C)  Temp src: Oral  Pulse: 71  Resp: 18  Height: 5' 5" (165.1 cm)  Weight: 71.9 kg (158 lb 9.6 oz)    Physical Examination:    General: well-developed well-nourished  male in no acute distress  Eye: PERRLA, EOMI, clear conjunctiva, eyelids normal  HENT: NC/AT, moist mucus membranes  Neck: full range of motion, no thyromegaly or lymphadenopathy  Respiratory: clear to auscultation bilaterally, respirations nonlabored  Cardiovascular: regular rate and rhythm without murmurs, gallops or rubs, ICD to left chest " wall  Gastrointestinal: soft, non-tender, non-distended with normal bowel sounds, without masses to palpation  Musculoskeletal: full range of motion of all extremities/spine without limitation or discomfort  Integumentary: warm to touch, no rashes or skin lesions present  Extremities: distal pulses intact, no peripheral edema noted  Neurologic: AAOx4, CN II-XII grossly intact, no signs of peripheral neurological deficit, motor/sensory function intact    Labs  BMP:   Lab Results   Component Value Date     06/21/2022    CHLORIDE 97 (L) 06/21/2022    CO2 29 06/21/2022    BUN 13.8 06/21/2022    CREATININE 1.48 (H) 06/21/2022    GLUCOSE 123 (H) 06/21/2022    CALCIUM 9.8 06/21/2022     CBC:   Lab Results   Component Value Date    WBC 9.9 04/20/2022    HGB 14.3 04/20/2022    HCT 44.4 04/20/2022    MCV 90.2 04/20/2022    RDW 15.5 04/20/2022     LFTs:   Lab Results   Component Value Date    LABPROT 8.0 (H) 06/21/2022    ALBUMIN 4.3 06/21/2022    AST 21 06/21/2022    ALT 19 06/21/2022    ALKPHOS 244 (H) 06/21/2022     FLP:   Lab Results   Component Value Date    CHOL 223 02/15/2022    HDL 39 02/15/2022    .00 02/15/2022    TRIG 198 02/15/2022    TOTALCHOLEST 6 02/15/2022     DM:   Lab Results   Component Value Date    HGBA1C 5.8 02/15/2022    .8 02/15/2022    LDLCALC 6 05/08/2021    CREATININE 1.48 (H) 06/21/2022    CREATRANDUR 197.1 (H) 05/02/2021    PROTEINURINE 23.6 05/02/2021     Thyroid:   Lab Results   Component Value Date    TSH 11.1833 (H) 06/21/2022    [id number] 1.08 06/21/2022     Anemia: No results found for: IRON, TIBC, FERRITIN, [id number], FOLATE          Assessment & Plan:     HFrEF 2/2 ICMO with EF less than 10% status post ICD  -CAD with recent high risk PCI May 2021 to LAD x1  -Follows with cardiology here, seen yesterday meds adjusted as follows below  -Continue GDMT: Entresto 24/26 mg BID, metoprolol 50 mg BID, Aldactone 25 mg daily (recently increased), KCl 20 mEq daily, Bumex 2 mg  BID    Chronic A-fib  -CHADSVASc 3, DAPT score 6  -Continue triple therapy per cardiology: aspirin and Plavix until least May 2022, and Xarelto 20 mg daily  -Defer antiplatelet therapy changes to cardiology    Hypertension  -Pt's BP well controlled at goal, continue GDMT as above    Hyperlipidemia  -FLP from 2/15/22 with Total cholesterol: 223, HDL 39, LDL: 144, Trigl: 198  -Cardiology restarted atorvastatin 80 mg daily today and repeating FLP in 2 weeks    Hypothyroidism  -TSH elevated, FT4 WNL  -Restart synthroid 37.5 mcg daily    Insomnia  -much improved sleep, taking melatonin OTC    Former Smoker  -Pt states he has 44 pack-year hx quit 2 years ago  -Low dose lung CA screening ordered    Health Maintenance  -Lung CA screening: Low dose CT 3/16/22 WNL Lung-rads 2; repeat in 1 year  -Colon CA Screening: Previously FIT+ 5/18/21, will need colonoscopy   -Vaccinations  -PNA: needs PSV23 repeated now and Prevnar 13 at age 66- refusing both  -TdAP: needs, refusing  -Covid: needs, refusing  -Flu: needs, refusing        To be addressed at next follow-up  -Colon cancer screening      Follow-ups  -Follow-up medicine clinic in 2 months      Aric Shirley MD  [employer] IM PGY II

## 2022-09-20 ENCOUNTER — CLINICAL SUPPORT (OUTPATIENT)
Dept: CARDIOLOGY | Facility: CLINIC | Age: 62
End: 2022-09-20
Payer: MEDICAID

## 2022-09-20 DIAGNOSIS — I50.20 HFREF (HEART FAILURE WITH REDUCED EJECTION FRACTION): Primary | ICD-10-CM

## 2022-09-20 PROCEDURE — 99211 OFF/OP EST MAY X REQ PHY/QHP: CPT | Mod: PBBFAC

## 2022-09-20 PROCEDURE — 93283 PRGRMG EVAL IMPLANTABLE DFB: CPT | Mod: PBBFAC

## 2022-10-03 ENCOUNTER — OFFICE VISIT (OUTPATIENT)
Dept: INTERNAL MEDICINE | Facility: CLINIC | Age: 62
End: 2022-10-03
Payer: MEDICAID

## 2022-10-03 VITALS
HEART RATE: 73 BPM | RESPIRATION RATE: 18 BRPM | TEMPERATURE: 98 F | HEIGHT: 65 IN | WEIGHT: 160.19 LBS | OXYGEN SATURATION: 99 % | SYSTOLIC BLOOD PRESSURE: 117 MMHG | BODY MASS INDEX: 26.69 KG/M2 | DIASTOLIC BLOOD PRESSURE: 72 MMHG

## 2022-10-03 DIAGNOSIS — E78.5 HYPERLIPIDEMIA, UNSPECIFIED HYPERLIPIDEMIA TYPE: Primary | Chronic | ICD-10-CM

## 2022-10-03 DIAGNOSIS — I25.10 CORONARY ARTERY DISEASE, UNSPECIFIED VESSEL OR LESION TYPE, UNSPECIFIED WHETHER ANGINA PRESENT, UNSPECIFIED WHETHER NATIVE OR TRANSPLANTED HEART: ICD-10-CM

## 2022-10-03 DIAGNOSIS — Z87.891 FORMER SMOKER: Chronic | ICD-10-CM

## 2022-10-03 DIAGNOSIS — I48.20 CHRONIC ATRIAL FIBRILLATION: ICD-10-CM

## 2022-10-03 DIAGNOSIS — E03.9 ACQUIRED HYPOTHYROIDISM: Chronic | ICD-10-CM

## 2022-10-03 DIAGNOSIS — I50.20 HFREF (HEART FAILURE WITH REDUCED EJECTION FRACTION): ICD-10-CM

## 2022-10-03 DIAGNOSIS — Z12.11 COLON CANCER SCREENING: ICD-10-CM

## 2022-10-03 PROCEDURE — 99214 OFFICE O/P EST MOD 30 MIN: CPT | Mod: PBBFAC

## 2022-10-03 RX ORDER — POTASSIUM CHLORIDE 750 MG/1
10 TABLET, EXTENDED RELEASE ORAL 2 TIMES DAILY
COMMUNITY
Start: 2022-08-09 | End: 2023-03-08 | Stop reason: SDUPTHER

## 2022-10-03 RX ORDER — ATORVASTATIN CALCIUM 80 MG/1
80 TABLET, FILM COATED ORAL DAILY
Qty: 90 TABLET | Refills: 3 | Status: SHIPPED | OUTPATIENT
Start: 2022-10-03 | End: 2022-10-04 | Stop reason: SDUPTHER

## 2022-10-03 RX ORDER — METOPROLOL SUCCINATE 50 MG/1
50 TABLET, EXTENDED RELEASE ORAL 2 TIMES DAILY
COMMUNITY
Start: 2022-07-23 | End: 2023-01-24 | Stop reason: SDUPTHER

## 2022-10-03 NOTE — PROGRESS NOTES
"[facility] Visit    Chief Complaint:      Follow-up (Patient presents to clinic for follow up visit. Patient states no concerns at this time. Patient states 0/10 pain at this time. ) and Medication Refill (Atorvastatin 80mg)     Subjective:     HPI:  Nicole Garcia is a 61 year old male with PMH of HFrEF w/ EF 10% 2/2 ICMO, CAD with hx of stent x1 to LAD in June 2021, HLD, HTN, A-fib, former smoker who is returning to clinic for follow up. No complaints today, requesting refill on his atorvastatin. Denies chest pain, shortness of breath, palpitations, orthopnea, SANFORD. States he has been compliant with his medicaitons including synthroid which he was previously not taking. Will redraw TSH, FT4 today, lipid panel, and refill atorvastatin.      Review of Systems  Constitutional: no fever, fatigue, weakness  Eye: no vision loss, eye redness, drainage, or pain  ENMT: no sore throat, ear pain, sinus pain/congestion, nasal congestion/drainage  Respiratory: no cough, no wheezing, no shortness of breath  Cardiovascular: no chest pain, no palpitations, no edema  Gastrointestinal: no nausea, vomiting, or diarrhea. No abdominal pain  Genitourinary: no dysuria, no urinary frequency or urgency, no hematuria  Hema/Lymph: no abnormal bruising or bleeding  Endocrine: no heat or cold intolerance, no excessive thirst or excessive urination  Musculoskeletal: no muscle or joint pain, no joint swelling  Integumentary: no skin rash or abnormal lesion  Neurologic: no headache, no dizziness, no weakness or numbness    Objective:   Last 24 Hour Vital Signs:  Vitals  BP: 117/72  Temp: 98.2 °F (36.8 °C)  Temp src: Oral  Pulse: 73  Resp: 18  SpO2: 99 %  Height: 5' 5" (165.1 cm)  Weight: 72.7 kg (160 lb 3.2 oz)    Physical Examination:    General: well-developed well-nourished  male in no acute distress  Eye: PERRLA, EOMI, clear conjunctiva, eyelids normal  HENT: NC/AT, moist mucus membranes  Neck: full range of motion, " no thyromegaly or lymphadenopathy  Respiratory: clear to auscultation bilaterally, respirations nonlabored  Cardiovascular: regular rate and rhythm without murmurs, gallops or rubs, ICD to left chest wall  Gastrointestinal: soft, non-tender, non-distended with normal bowel sounds, without masses to palpation  Musculoskeletal: full range of motion of all extremities/spine without limitation or discomfort  Integumentary: warm to touch, no rashes or skin lesions present  Extremities: distal pulses intact, no peripheral edema noted  Neurologic: AAOx4, CN II-XII grossly intact, no signs of peripheral neurological deficit, motor/sensory function intact    Labs  BMP:   Lab Results   Component Value Date    CHLORIDE 97 (L) 06/21/2022    CO2 29 06/21/2022    BUN 13.8 06/21/2022    CREATININE 1.48 (H) 06/21/2022    GLUCOSE 123 (H) 06/21/2022    CALCIUM 9.8 06/21/2022     CBC:   Lab Results   Component Value Date    WBC 9.9 04/20/2022    HGB 14.3 04/20/2022    HCT 44.4 04/20/2022    MCV 90.2 04/20/2022    RDW 15.5 04/20/2022     LFTs:   Lab Results   Component Value Date    LABPROT 8.0 (H) 06/21/2022    ALBUMIN 4.3 06/21/2022    AST 21 06/21/2022    ALT 19 06/21/2022    ALKPHOS 244 (H) 06/21/2022     FLP:   Lab Results   Component Value Date    CHOL 223 02/15/2022    HDL 39 02/15/2022    .00 02/15/2022    TRIG 198 02/15/2022    TOTALCHOLEST 6 02/15/2022     DM:   Lab Results   Component Value Date    HGBA1C 5.8 02/15/2022    .8 02/15/2022    LDLCALC 6 05/08/2021    CREATININE 1.48 (H) 06/21/2022    CREATRANDUR 197.1 (H) 05/02/2021    PROTEINURINE 23.6 05/02/2021     Thyroid:   Lab Results   Component Value Date    TSH 11.1833 (H) 06/21/2022    [id number] 1.08 06/21/2022     Anemia: No results found for: IRON, TIBC, FERRITIN, [id number], FOLATE          Assessment & Plan:     HFrEF 2/2 ICMO with EF less than 10% status post ICD  -CAD with recent high risk PCI May 2021 to LAD x1  -Follows with cardiology  here  -Continue GDMT: Entresto 24/26 mg BID, metoprolol 50 mg BID, Aldactone 25 mg daily, KCl 20 mEq daily, Bumex 2 mg BID    Chronic A-fib  -CHADSVASc 3, DAPT score 6  -On Plavix and Xarelto therapy per cardiology: Plavix 75 mg daily and Xarelto 20 mg daily  -Defer antiplatelet therapy changes to cardiology    Hypertension  -Pt's /72 at goal, continue GDMT as above    Hyperlipidemia  -FLP from 2/15/22 with Total cholesterol: 223, HDL 39, LDL: 144, Trigl: 198  -Cardiology restarted atorvastatin 80 mg daily today and repeating FLP today     Hypothyroidism  -TSH elevated, FT4 WNL; redraw both today  -Continue synthroid 37.5 mcg daily    Insomnia  -much improved sleep, taking melatonin OTC    Former Smoker  -Pt states he has 44 pack-year hx quit 2 years ago  -Low dose lung CA screening ordered    Health Maintenance  -Lung CA screening: Low dose CT 3/16/22 WNL Lung-rads 2; repeat in 1 year  -Colon CA Screening: Previously FIT+ 5/18/21, refusing colonoscopy today- agreed to perform Cologuard and if positive will schedule colonoscopy  -Vaccinations:   -PNA: needs PSV23 repeated now and Prevnar 13 at age 66- refusing both   -TdAP: needs, refusing   -Covid: needs, refusing   -Flu: needs, refusing    To be addressed at next follow-up  -Cologuard results      Follow-ups  -Follow-up medicine clinic in 6 months      Aric Shirley MD  [employer] IM PGY II

## 2022-10-03 NOTE — PROGRESS NOTES
I have reviewed the notes, assessments, and management plan performed by resident, I concur with her/his documentation of Nicole Garcia.

## 2022-10-04 DIAGNOSIS — E78.5 HYPERLIPIDEMIA, UNSPECIFIED HYPERLIPIDEMIA TYPE: Chronic | ICD-10-CM

## 2022-10-04 RX ORDER — ATORVASTATIN CALCIUM 80 MG/1
80 TABLET, FILM COATED ORAL DAILY
Qty: 90 TABLET | Refills: 3 | Status: SHIPPED | OUTPATIENT
Start: 2022-10-04 | End: 2022-10-20 | Stop reason: SDUPTHER

## 2022-10-04 RX ORDER — ATORVASTATIN CALCIUM 80 MG/1
80 TABLET, FILM COATED ORAL DAILY
Qty: 90 TABLET | Refills: 3 | Status: SHIPPED | OUTPATIENT
Start: 2022-10-04 | End: 2022-10-04 | Stop reason: SDUPTHER

## 2022-10-04 NOTE — TELEPHONE ENCOUNTER
Pt was here for office visit and Rx (Lipitor) was printed instead of escribed to [facility] pharmacy. Please send to pharmacy. Thanks.

## 2022-10-17 LAB — NONINV COLON CA DNA+OCC BLD SCRN STL QL: POSITIVE

## 2022-10-20 ENCOUNTER — OFFICE VISIT (OUTPATIENT)
Dept: CARDIOLOGY | Facility: CLINIC | Age: 62
End: 2022-10-20
Payer: MEDICAID

## 2022-10-20 VITALS
BODY MASS INDEX: 26.35 KG/M2 | WEIGHT: 158.19 LBS | DIASTOLIC BLOOD PRESSURE: 66 MMHG | TEMPERATURE: 99 F | RESPIRATION RATE: 20 BRPM | OXYGEN SATURATION: 98 % | HEIGHT: 65 IN | SYSTOLIC BLOOD PRESSURE: 110 MMHG | HEART RATE: 54 BPM

## 2022-10-20 DIAGNOSIS — I50.9 CONGESTIVE HEART FAILURE, UNSPECIFIED HF CHRONICITY, UNSPECIFIED HEART FAILURE TYPE: ICD-10-CM

## 2022-10-20 DIAGNOSIS — E78.5 HYPERLIPIDEMIA, UNSPECIFIED HYPERLIPIDEMIA TYPE: ICD-10-CM

## 2022-10-20 DIAGNOSIS — Z79.899 HIGH RISK MEDICATION USE: Primary | ICD-10-CM

## 2022-10-20 LAB
ALBUMIN SERPL-MCNC: 4.4 GM/DL (ref 3.4–4.8)
ALP SERPL-CCNC: 151 UNIT/L (ref 40–150)
ALT SERPL-CCNC: 14 UNIT/L (ref 0–55)
AST SERPL-CCNC: 23 UNIT/L (ref 5–34)
BILIRUBIN DIRECT+TOT PNL SERPL-MCNC: 0.4 MG/DL (ref 0–0.5)
BILIRUBIN DIRECT+TOT PNL SERPL-MCNC: 1 MG/DL (ref 0–0.8)
BILIRUBIN DIRECT+TOT PNL SERPL-MCNC: 1.4 MG/DL
PROT SERPL-MCNC: 7.5 GM/DL (ref 5.8–7.6)

## 2022-10-20 PROCEDURE — 99214 OFFICE O/P EST MOD 30 MIN: CPT | Mod: PBBFAC | Performed by: INTERNAL MEDICINE

## 2022-10-20 PROCEDURE — 36415 COLL VENOUS BLD VENIPUNCTURE: CPT | Performed by: INTERNAL MEDICINE

## 2022-10-20 PROCEDURE — 80076 HEPATIC FUNCTION PANEL: CPT | Performed by: INTERNAL MEDICINE

## 2022-10-20 RX ORDER — ATORVASTATIN CALCIUM 80 MG/1
80 TABLET, FILM COATED ORAL DAILY
Qty: 90 TABLET | Refills: 3 | Status: SHIPPED | OUTPATIENT
Start: 2022-10-20 | End: 2023-07-25 | Stop reason: SDUPTHER

## 2022-10-20 NOTE — PROGRESS NOTES
Cardiology Attending    I evaluated Mr. Nicole Garcia and discussed the patient's symptoms, findings, and management plan with the resident.  Please see the Cardiology Clinic note for details.

## 2022-10-20 NOTE — PROGRESS NOTES
[facility] Cardiology Office Visit Note      CHIEF COMPLAINT:   Chief Complaint   Patient presents with    f/u denies chest pain or sob              HPI:  Nicole Garcia 61 y.o. male with PMH of ischemic cardiomyopathy (EF 10%), Afib, HTN, HLD, CAD s/p PCI to LAD 5/2021 who is presenting to [facility] Cardiology clinic for 3-month follow up. Continued on amiodarone after having been shocked by his device for V-Tach earlier this earlier, device has been interrogated since then and the patient has been asymptomatic.  Can walk from parking lot to clinic without any SOB,CP, palpitations. Denies any prescyncope/synopal episodes or LE edema. Complaint with all medications.                                                                                                                                                                                                                                                                                                                                                                                                       CARDIAC TESTING:  PCI to LAD in May 2021    TTE 12/2022 - EF 10%     Results for orders placed in visit on 08/29/18    Past Surgical History:   Procedure Laterality Date    CARDIAC CATHETERIZATION       Social History     Socioeconomic History    Marital status:     Number of children: 1   Tobacco Use    Smoking status: Former     Types: Cigarettes    Smokeless tobacco: Never   Substance and Sexual Activity    Alcohol use: Yes     Alcohol/week: 2.0 standard drinks     Types: 2 Shots of liquor per week    Drug use: Not Currently    Sexual activity: Not Currently     Social Determinants of Health     Financial Resource Strain: Low Risk     Difficulty of Paying Living Expenses: Not very hard   Food Insecurity: No Food Insecurity    Worried About Running Out of Food in the Last Year: Never true    Ran Out of Food in the Last Year: Never true   Transportation Needs: No  Transportation Needs    Lack of Transportation (Medical): No    Lack of Transportation (Non-Medical): No   Physical Activity: Inactive    Days of Exercise per Week: 0 days    Minutes of Exercise per Session: 0 min   Stress: No Stress Concern Present    Feeling of Stress : Not at all   Social Connections: Socially Isolated    Frequency of Communication with Friends and Family: Three times a week    Frequency of Social Gatherings with Friends and Family: Once a week    Attends Jewish Services: Never    Active Member of Clubs or Organizations: No    Attends Club or Organization Meetings: Never    Marital Status:    Housing Stability: Low Risk     Unable to Pay for Housing in the Last Year: No    Number of Places Lived in the Last Year: 1    Unstable Housing in the Last Year: No        Family History   Problem Relation Age of Onset    Heart disease Mother     Stroke Mother     Heart attack Mother     Heart failure Mother     Heart disease Father     Stroke Father     Heart attack Father     Heart failure Father     Heart failure Sister     Heart disease Brother     Stroke Brother     Heart failure Brother     Arrhythmia Brother          Current Outpatient Medications:     albuterol (PROVENTIL/VENTOLIN HFA) 90 mcg/actuation inhaler, Inhale 2 puffs into the lungs every 4 (four) hours as needed for Wheezing., Disp: 18 g, Rfl: 11    amiodarone (PACERONE) 200 MG Tab, Take 1 tablet (200 mg total) by mouth once daily., Disp: 90 tablet, Rfl: 3    atorvastatin (LIPITOR) 80 MG tablet, Take 1 tablet (80 mg total) by mouth once daily., Disp: 90 tablet, Rfl: 3    bumetanide (BUMEX) 2 MG tablet, Take 1 tablet by mouth 2 (two) times daily., Disp: , Rfl:     clopidogreL (PLAVIX) 75 mg tablet, Take 75 mg by mouth once daily., Disp: , Rfl:     ergocalciferol (ERGOCALCIFEROL) 50,000 unit Cap, Take 1 capsule by mouth every 7 days., Disp: , Rfl:     levothyroxine (SYNTHROID) 75 MCG tablet, Take 0.5 tablets (37.5 mcg total) by  "mouth once daily., Disp: 90 tablet, Rfl: 3    metoprolol succinate (TOPROL-XL) 50 MG 24 hr tablet, Take 50 mg by mouth 2 (two) times daily., Disp: , Rfl:     potassium chloride (KLOR-CON) 10 MEQ TbSR, Take 10 mEq by mouth 2 (two) times daily., Disp: , Rfl:     rivaroxaban (XARELTO) 20 mg Tab, Take 20 mg by mouth daily with dinner or evening meal., Disp: , Rfl:     sacubitriL-valsartan (ENTRESTO) 24-26 mg per tablet, Take 1 tablet by mouth., Disp: , Rfl:     spironolactone (ALDACTONE) 25 MG tablet, Take 25 mg by mouth once daily., Disp: , Rfl:     vitamin D (VITAMIN D3) 1000 units Tab, Take 1,000 Units by mouth once daily., Disp: , Rfl:    Xarelto 20mg daily    ROS:                                                                                                                                                                             Review of Systems   Constitutional: Negative.    HENT: Negative.     Eyes: Negative.    Respiratory: Negative.     Cardiovascular: Negative.    Gastrointestinal: Negative.    Genitourinary: Negative.    Skin: Negative.       Blood pressure 110/66, pulse (!) 54, temperature 99 °F (37.2 °C), temperature source Oral, resp. rate 20, height 5' 5" (1.651 m), weight 71.8 kg (158 lb 3.2 oz), SpO2 98 %.     PE:   General - Appears comfortable, appropriatley conversive   Mental Status - alert and oriented x 3, speaking in logical, relevant sentences   HEENT - no rhinorrhea, no JVD seen   Cardiac - RRR, no murmurs, rubs, or gallops; no edema in LE   Respiratory - breathing comfortably;  mild intermittent inspiratory wheezing  Abdominal - nondistended, soft, nontender to palpation   Extremities - LE, UE, and joints are nonerythematous and nonswollen   Skin - no rashes or bruises seen on skin        ASSESSMENT/PLAN:    ICMO s/p PCI to LAD May 2021 in [facility]  HFrEF ( EF 10%, 12/2021 TTE )  NYHA class II/III stage C    -continue GDMT:  metoprolol succinate 50mg BID, Entresto 24/26, Aldactone 25mg " QD  -continue Plavix 75mg, Atorvastatin 80mg, Bumex 2mg BID,   -ICD Device last interrogated 03/10/2022 noting 2 episodes of VTACH,terminated with ATP, no shocks delivered  -Symptoms well-controlled at this time  -Continue rsk-facor modication, low-salt diet, and continued tobacco abstinence    Afib-rate controlled  -continue metoprolol 50mg BID, amiodarone 200mg daily, Xarelto 20mg daily    Hx V-Tach  -continue amiodarone 200mg daily  -LFT and TSH q6 months while on amiodarone. CXR ordered    HTN, HLD  -LDL 57 @ goal, continue statin therapy   -F/u with PCP    Hx Hypothyroidism  -Cotinue uptitrating synthroid until TSH within goal, defer to PCP    3 month f/u with CXR,TFT, LFTS TTE will discuss possibility of refer for heart transplant if he is amenable at this visit.     Carmen Wilkins MD  [facility]  Internal Medicine Resident PGY-3

## 2022-10-21 ENCOUNTER — HOSPITAL ENCOUNTER (EMERGENCY)
Facility: HOSPITAL | Age: 62
Discharge: HOME | End: 2022-10-22
Attending: FAMILY MEDICINE
Payer: MEDICAID

## 2022-10-21 ENCOUNTER — HOSPITAL ENCOUNTER (OUTPATIENT)
Dept: RADIOLOGY | Facility: HOSPITAL | Age: 62
Discharge: HOME | End: 2022-10-21
Attending: STUDENT IN AN ORGANIZED HEALTH CARE EDUCATION/TRAINING PROGRAM
Payer: MEDICAID

## 2022-10-21 DIAGNOSIS — Z79.899 HIGH RISK MEDICATION USE: ICD-10-CM

## 2022-10-21 DIAGNOSIS — B34.9 VIRAL SYNDROME: Primary | ICD-10-CM

## 2022-10-21 DIAGNOSIS — R50.9 FEVER, UNSPECIFIED FEVER CAUSE: ICD-10-CM

## 2022-10-21 LAB
FLUAV AG UPPER RESP QL IA.RAPID: NOT DETECTED
FLUBV AG UPPER RESP QL IA.RAPID: NOT DETECTED
PATH REV: NORMAL
SARS-COV-2 RNA RESP QL NAA+PROBE: NOT DETECTED
STREP A PCR (OHS): NOT DETECTED

## 2022-10-21 PROCEDURE — 99283 EMERGENCY DEPT VISIT LOW MDM: CPT | Mod: 25

## 2022-10-21 PROCEDURE — 87651 STREP A DNA AMP PROBE: CPT | Performed by: PHYSICIAN ASSISTANT

## 2022-10-21 PROCEDURE — 0241U COVID/FLU A&B PCR: CPT | Performed by: PHYSICIAN ASSISTANT

## 2022-10-21 PROCEDURE — 71046 X-RAY EXAM CHEST 2 VIEWS: CPT | Mod: TC

## 2022-10-21 RX ORDER — ACETAMINOPHEN 500 MG
1000 TABLET ORAL
Status: COMPLETED | OUTPATIENT
Start: 2022-10-21 | End: 2022-10-22

## 2022-10-21 RX ORDER — BENZONATATE 200 MG/1
200 CAPSULE ORAL 3 TIMES DAILY PRN
Qty: 20 CAPSULE | Refills: 0 | Status: SHIPPED | OUTPATIENT
Start: 2022-10-21 | End: 2023-09-13

## 2022-10-22 VITALS
HEART RATE: 77 BPM | RESPIRATION RATE: 20 BRPM | OXYGEN SATURATION: 98 % | WEIGHT: 157.06 LBS | DIASTOLIC BLOOD PRESSURE: 78 MMHG | SYSTOLIC BLOOD PRESSURE: 112 MMHG | HEIGHT: 65 IN | BODY MASS INDEX: 26.17 KG/M2 | TEMPERATURE: 100 F

## 2022-10-22 PROCEDURE — 25000003 PHARM REV CODE 250: Performed by: PHYSICIAN ASSISTANT

## 2022-10-22 RX ADMIN — ACETAMINOPHEN 1000 MG: 500 TABLET ORAL at 12:10

## 2022-10-22 NOTE — ED PROVIDER NOTES
"Encounter Date: 10/21/2022       History     Chief Complaint   Patient presents with    Fever     Pt reports worsening "flu like symptoms" starting today and worsening, pt reports cough and feeling feverish, reports 100.4 temp at home, to motrin 15 min prior to arrival, afebrile in triage.      Patient reports to the ER with complaints of fever and body aches since this morning     The history is provided by the patient.   URI  The primary symptoms include fever and cough. Primary symptoms do not include sore throat, abdominal pain, nausea or rash. The fever began today. The fever has been rapidly improving since its onset. The temperature was taken by no thermometer.   The cough began yesterday. The cough is productive. The sputum is clear.   The illness is not associated with chills or congestion. Risk factors for severe complications from URI include chronic cardiac disease.   Review of patient's allergies indicates:   Allergen Reactions    Egg derived Anaphylaxis    Flu vac 2012 (18-64yrs)(pf)      Any thing with eggs   Stop breathing    Influenza virus vaccines      Other reaction(s): Allergy to eggs., Allergy to eggs.     Past Medical History:   Diagnosis Date    Atrial fibrillation     Coronary artery disease     Hyperlipidemia     Hypertension      Past Surgical History:   Procedure Laterality Date    CARDIAC CATHETERIZATION      CORONARY ANGIOPLASTY WITH STENT PLACEMENT       Family History   Problem Relation Age of Onset    Heart disease Mother     Stroke Mother     Heart attack Mother     Heart failure Mother     Heart disease Father     Stroke Father     Heart attack Father     Heart failure Father     Heart failure Sister     Heart disease Brother     Stroke Brother     Heart failure Brother     Arrhythmia Brother      Social History     Tobacco Use    Smoking status: Former     Types: Cigarettes    Smokeless tobacco: Never   Substance Use Topics    Alcohol use: Yes     Alcohol/week: 2.0 standard drinks "     Types: 2 Shots of liquor per week    Drug use: Not Currently     Review of Systems   Constitutional:  Positive for fever. Negative for chills.   HENT:  Negative for congestion and sore throat.    Respiratory:  Positive for cough. Negative for shortness of breath.    Cardiovascular:  Negative for chest pain.   Gastrointestinal:  Negative for abdominal pain and nausea.   Genitourinary:  Negative for dysuria.   Musculoskeletal:  Negative for back pain.   Skin:  Negative for rash.   Neurological:  Negative for weakness.   Hematological:  Does not bruise/bleed easily.   Psychiatric/Behavioral: Negative.       Physical Exam     Initial Vitals [10/21/22 2006]   BP Pulse Resp Temp SpO2   114/81 80 20 98.8 °F (37.1 °C) 97 %      MAP       --         Physical Exam    Vitals reviewed.  Constitutional: He appears well-developed.   HENT:   Head: Normocephalic and atraumatic.   Mouth/Throat: No oropharyngeal exudate.   Eyes: Conjunctivae and EOM are normal. Pupils are equal, round, and reactive to light.   Neck:   Normal range of motion.  Cardiovascular:  Normal rate, regular rhythm and normal heart sounds.           Pulmonary/Chest: Breath sounds normal. He exhibits no tenderness.   Abdominal: Abdomen is soft. Bowel sounds are normal. He exhibits no distension. There is no abdominal tenderness.   Musculoskeletal:         General: Normal range of motion.      Cervical back: Normal range of motion.     Neurological: He is alert and oriented to person, place, and time. He displays normal reflexes. No cranial nerve deficit or sensory deficit. GCS score is 15. GCS eye subscore is 4. GCS verbal subscore is 5. GCS motor subscore is 6.   Skin: Skin is warm. No pallor.   Psychiatric: He has a normal mood and affect. His behavior is normal. Judgment and thought content normal.       ED Course   Procedures  Labs Reviewed   COVID/FLU A&B PCR - Normal    Narrative:     The Xpert Xpress SARS-CoV-2/FLU/RSV plus is a rapid, multiplexed  real-time PCR test intended for the simultaneous qualitative detection and differentiation of SARS-CoV-2, Influenza A, Influenza B, and respiratory syncytial virus (RSV) viral RNA in either nasopharyngeal swab or nasal swab specimens.         STREP GROUP A BY PCR - Normal    Narrative:     The Xpert Xpress Strep A test is a rapid, qualitative in vitro diagnostic test for the detection of Streptococcus pyogenes (Group A ß-hemolytic Streptococcus, Strep A) in throat swab specimens from patients with signs and symptoms of pharyngitis.            Imaging Results              X-Ray Chest PA And Lateral (Final result)  Result time 10/21/22 23:41:42      Final result by Tk Raymundo MD (10/21/22 23:41:42)                   Impression:      No acute cardiopulmonary process.      Electronically signed by: Tk Raymundo MD  Date:    10/21/2022  Time:    23:41               Narrative:    EXAMINATION:  XR CHEST PA AND LATERAL    CLINICAL HISTORY:  cough, fever;    TECHNIQUE:  PA and lateral views of the chest were performed.    COMPARISON:  10/21/2022    FINDINGS:  Heart size enlarged the pulmonary vasculature is normal.    Coarse interstitial markings lung parenchyma identified with subsegmental atelectatic changes on the left.    Left chest wall pacer is identified.                                       Medications   acetaminophen tablet 1,000 mg (has no administration in time range)     Medical Decision Making:   Clinical Tests:   Lab Tests: Ordered and Reviewed  Radiological Study: Ordered and Reviewed  All swabs negative. No acute abnormality on CXR. Instructed patient to take tylenol for fever control. If symptoms do not improve w/in next 24 hours, recommended returning to ED or urgent care to get retested for COVID & flu. Will discharge w/ antitussive. Instructed to follow-up w/ PCP. ED precautions given.           ED Course as of 10/21/22 4636   Fri Oct 21, 2022   2314 On re-exam patient is febrile w/ temp of 100.7.  Reports ongoing cough. As COVID & flu are both negative, will order CXR to rule out CAP. [NB]      ED Course User Index  [NB] RAFIA Glaser                 Clinical Impression:   Final diagnoses:  [B34.9] Viral syndrome (Primary)  [R50.9] Fever, unspecified fever cause      ED Disposition Condition    Discharge Stable          ED Prescriptions       Medication Sig Dispense Start Date End Date Auth. Provider    benzonatate (TESSALON) 200 MG capsule Take 1 capsule (200 mg total) by mouth 3 (three) times daily as needed for Cough. 20 capsule 10/21/2022 -- RAFIA Glaser          Follow-up Information       Follow up With Specialties Details Why Contact Info    Aric Shirley MD Internal Medicine In 3 days  [address] [facility] [address]  [phone]      [facility] Emergency Medicine  If symptoms worsen or fail to improve. [address] [facility] [id number] [phone]             RAFIA Glaser  10/21/22 1254

## 2022-10-22 NOTE — DISCHARGE INSTRUCTIONS
Report to Emergency Department if symptoms return or worsen; [facility] Within 1 to 2 days, It is important that you follow up with your primary care provider or specialist if indicated for further evaluation, workup, and treatment as necessary. The exam and treatment you received in Emergency Department was for an urgent problem and NOT INTENDED AS COMPLETE CARE. It is important that you FOLLOW UP with a doctor for ongoing care. If your symptoms become WORSE or you DO NOT IMPROVE and you are unable to reach your health care provider, you should RETURN to the Emergency Department. The Emergency Department provider has provided a PRELIMINARY INTERPRETATION of all your studies. A final interpretation may be done after you are discharged. If a change in your diagnosis or treatment is needed WE WILL CONTACT YOU. It is critical that we have a CURRENT PHONE NUMBER FOR YOU.

## 2022-10-26 ENCOUNTER — LAB VISIT (OUTPATIENT)
Dept: LAB | Facility: HOSPITAL | Age: 62
End: 2022-10-26
Attending: UROLOGY
Payer: MEDICAID

## 2022-10-26 DIAGNOSIS — R97.20 ELEVATED PSA: Primary | ICD-10-CM

## 2022-10-26 DIAGNOSIS — R97.20 ELEVATED PSA: ICD-10-CM

## 2022-10-26 LAB — PSA SERPL-MCNC: 7.92 NG/ML

## 2022-10-26 PROCEDURE — 36415 COLL VENOUS BLD VENIPUNCTURE: CPT

## 2022-10-26 PROCEDURE — 84153 ASSAY OF PSA TOTAL: CPT

## 2022-10-27 ENCOUNTER — OFFICE VISIT (OUTPATIENT)
Dept: UROLOGY | Facility: CLINIC | Age: 62
End: 2022-10-27
Payer: MEDICAID

## 2022-10-27 VITALS
RESPIRATION RATE: 18 BRPM | HEART RATE: 61 BPM | OXYGEN SATURATION: 97 % | TEMPERATURE: 98 F | BODY MASS INDEX: 26.46 KG/M2 | SYSTOLIC BLOOD PRESSURE: 124 MMHG | DIASTOLIC BLOOD PRESSURE: 83 MMHG | WEIGHT: 158.81 LBS | HEIGHT: 65 IN

## 2022-10-27 DIAGNOSIS — R97.20 ELEVATED PSA: Primary | ICD-10-CM

## 2022-10-27 PROCEDURE — 3074F SYST BP LT 130 MM HG: CPT | Mod: CPTII,,, | Performed by: UROLOGY

## 2022-10-27 PROCEDURE — 99204 PR OFFICE/OUTPT VISIT, NEW, LEVL IV, 45-59 MIN: ICD-10-PCS | Mod: S$PBB,,, | Performed by: UROLOGY

## 2022-10-27 PROCEDURE — 1159F MED LIST DOCD IN RCRD: CPT | Mod: CPTII,,, | Performed by: UROLOGY

## 2022-10-27 PROCEDURE — 1160F RVW MEDS BY RX/DR IN RCRD: CPT | Mod: CPTII,,, | Performed by: UROLOGY

## 2022-10-27 PROCEDURE — 1159F PR MEDICATION LIST DOCUMENTED IN MEDICAL RECORD: ICD-10-PCS | Mod: CPTII,,, | Performed by: UROLOGY

## 2022-10-27 PROCEDURE — 3079F PR MOST RECENT DIASTOLIC BLOOD PRESSURE 80-89 MM HG: ICD-10-PCS | Mod: CPTII,,, | Performed by: UROLOGY

## 2022-10-27 PROCEDURE — 3079F DIAST BP 80-89 MM HG: CPT | Mod: CPTII,,, | Performed by: UROLOGY

## 2022-10-27 PROCEDURE — 99215 OFFICE O/P EST HI 40 MIN: CPT | Mod: PBBFAC | Performed by: UROLOGY

## 2022-10-27 PROCEDURE — 99204 OFFICE O/P NEW MOD 45 MIN: CPT | Mod: S$PBB,,, | Performed by: UROLOGY

## 2022-10-27 PROCEDURE — 4010F ACE/ARB THERAPY RXD/TAKEN: CPT | Mod: CPTII,,, | Performed by: UROLOGY

## 2022-10-27 PROCEDURE — 4010F PR ACE/ARB THEARPY RXD/TAKEN: ICD-10-PCS | Mod: CPTII,,, | Performed by: UROLOGY

## 2022-10-27 PROCEDURE — 1160F PR REVIEW ALL MEDS BY PRESCRIBER/CLIN PHARMACIST DOCUMENTED: ICD-10-PCS | Mod: CPTII,,, | Performed by: UROLOGY

## 2022-10-27 PROCEDURE — 3074F PR MOST RECENT SYSTOLIC BLOOD PRESSURE < 130 MM HG: ICD-10-PCS | Mod: CPTII,,, | Performed by: UROLOGY

## 2022-10-27 RX ORDER — SULFAMETHOXAZOLE AND TRIMETHOPRIM 800; 160 MG/1; MG/1
1 TABLET ORAL 2 TIMES DAILY
Qty: 42 TABLET | Refills: 0 | Status: SHIPPED | OUTPATIENT
Start: 2022-10-27 | End: 2022-11-17

## 2022-10-27 NOTE — PROGRESS NOTES
CC:  Elevated PSA    HPI:  Nicole Garcia is a 61 y.o. male seen for follow-up of elevated PSA.  He had a prostate biopsy in April of 2022 for an elevated PSA.  He had a PSA in May of 2021 which was 5.88.  The prostate biopsy in April of 2022 was negative.  He is here for continued observation of the elevated PSA.  He has no urinary complaints today.    Lab Results:  Recent Labs     10/26/22  1258   PSA 7.92*       Data Review:  PSA, prostate biopsy, and last urology note from Dr. Kings Grady dated 25 April 2022.     ROS:  All systems reviewed and are negative except as documented in HPI and/or Assessment and Plan.     Patient Active Problem List:     Patient Active Problem List   Diagnosis    HFrEF (heart failure with reduced ejection fraction)    CAD (coronary artery disease)    Atrial fibrillation    Acquired hypothyroidism    Former smoker    HLD (hyperlipidemia)        Past Medical History:  Past Medical History:   Diagnosis Date    Atrial fibrillation     Coronary artery disease     Hyperlipidemia     Hypertension         Past Surgical History:  Past Surgical History:   Procedure Laterality Date    CARDIAC CATHETERIZATION      CORONARY ANGIOPLASTY WITH STENT PLACEMENT          Family History:  Family History   Problem Relation Age of Onset    Heart disease Mother     Stroke Mother     Heart attack Mother     Heart failure Mother     Heart disease Father     Stroke Father     Heart attack Father     Heart failure Father     Heart failure Sister     Heart disease Brother     Stroke Brother     Heart failure Brother     Arrhythmia Brother         Social History:  Social History     Socioeconomic History    Marital status:     Number of children: 1   Tobacco Use    Smoking status: Former     Types: Cigarettes    Smokeless tobacco: Never   Substance and Sexual Activity    Alcohol use: Yes     Alcohol/week: 2.0 standard drinks     Types: 2 Shots of liquor per week    Drug use: Not Currently    Sexual  activity: Not Currently     Social Determinants of Health     Financial Resource Strain: Low Risk     Difficulty of Paying Living Expenses: Not very hard   Food Insecurity: No Food Insecurity    Worried About Running Out of Food in the Last Year: Never true    Ran Out of Food in the Last Year: Never true   Transportation Needs: No Transportation Needs    Lack of Transportation (Medical): No    Lack of Transportation (Non-Medical): No   Physical Activity: Inactive    Days of Exercise per Week: 0 days    Minutes of Exercise per Session: 0 min   Stress: No Stress Concern Present    Feeling of Stress : Not at all   Social Connections: Socially Isolated    Frequency of Communication with Friends and Family: Three times a week    Frequency of Social Gatherings with Friends and Family: Once a week    Attends Muslim Services: Never    Active Member of Clubs or Organizations: No    Attends Club or Organization Meetings: Never    Marital Status:    Housing Stability: Low Risk     Unable to Pay for Housing in the Last Year: No    Number of Places Lived in the Last Year: 1    Unstable Housing in the Last Year: No        Allergies:  Review of patient's allergies indicates:   Allergen Reactions    Egg derived Anaphylaxis    Flu vac 2012 (18-64yrs)(pf)      Any thing with eggs   Stop breathing    Influenza virus vaccines      Other reaction(s): Allergy to eggs., Allergy to eggs.        Objective:  Vitals:    10/27/22 0831   BP: 124/83   Pulse: 61   Resp: 18   Temp: 98.4 °F (36.9 °C)     General:  Well developed, well nourished adult male in no acute distress  Abdomen: Soft, nontender, no masses  Extremities:  No clubbing, cyanosis, or edema  Neurologic:  Grossly intact  Musculoskeletal:  Normal tone      Assessment:  1. Elevated PSA  - sulfamethoxazole-trimethoprim 800-160mg (BACTRIM DS) 800-160 mg Tab; Take 1 tablet by mouth 2 (two) times daily. for 21 days  Dispense: 42 tablet; Refill: 0  - PSA, Total (Diagnostic);  Future     Plan:  Patient is given Bactrim DS to take for 21 days to see if the PSA is related to an infectious or inflammatory condition.  I will see him back in one month and he will get a PSA prior to that visit.    Follow-up:  One month after PSA.

## 2022-10-27 NOTE — PROGRESS NOTES
Patient seen by Dr. Kamara. Prescription sent to patient pharmacy. RTC 1 month with PSA. Discharge instructions given verbal and written.      121

## 2022-11-02 DIAGNOSIS — R74.8 ALKALINE PHOSPHATASE ELEVATION: Primary | ICD-10-CM

## 2022-11-04 ENCOUNTER — TELEPHONE (OUTPATIENT)
Dept: INTERNAL MEDICINE | Facility: CLINIC | Age: 62
End: 2022-11-04
Payer: MEDICAID

## 2022-11-04 NOTE — TELEPHONE ENCOUNTER
----- Message from Carmen Wilkins MD sent at 11/2/2022  5:07 PM CDT -----  Please inform patient I have order a liver ultrasound because one of her liver enzymes a slightly higher than normal. Radiology will call to schedule      ----- Message -----  From: Background User Lab  Sent: 10/20/2022   3:30 PM CDT  To: Carmen Wilkins MD

## 2022-11-04 NOTE — TELEPHONE ENCOUNTER
Patient made aware and verbalized complete understanding and then stated Liver ultrasound has been scheduled for Wed. 11/9/22 @ 7527.

## 2022-11-28 ENCOUNTER — LAB VISIT (OUTPATIENT)
Dept: LAB | Facility: HOSPITAL | Age: 62
End: 2022-11-28
Attending: UROLOGY
Payer: MEDICAID

## 2022-11-28 DIAGNOSIS — R97.20 ELEVATED PSA: ICD-10-CM

## 2022-11-28 LAB — PSA SERPL-MCNC: 5.69 NG/ML

## 2022-11-28 PROCEDURE — 36415 COLL VENOUS BLD VENIPUNCTURE: CPT

## 2022-11-28 PROCEDURE — 84153 ASSAY OF PSA TOTAL: CPT

## 2022-11-30 ENCOUNTER — OFFICE VISIT (OUTPATIENT)
Dept: UROLOGY | Facility: CLINIC | Age: 62
End: 2022-11-30
Payer: MEDICAID

## 2022-11-30 VITALS
HEART RATE: 65 BPM | HEIGHT: 65 IN | DIASTOLIC BLOOD PRESSURE: 67 MMHG | WEIGHT: 156.19 LBS | TEMPERATURE: 98 F | BODY MASS INDEX: 26.02 KG/M2 | SYSTOLIC BLOOD PRESSURE: 100 MMHG | OXYGEN SATURATION: 96 %

## 2022-11-30 DIAGNOSIS — R97.20 ELEVATED PSA: Primary | ICD-10-CM

## 2022-11-30 PROCEDURE — 3074F SYST BP LT 130 MM HG: CPT | Mod: CPTII,,, | Performed by: UROLOGY

## 2022-11-30 PROCEDURE — 3078F PR MOST RECENT DIASTOLIC BLOOD PRESSURE < 80 MM HG: ICD-10-PCS | Mod: CPTII,,, | Performed by: UROLOGY

## 2022-11-30 PROCEDURE — 99212 PR OFFICE/OUTPT VISIT, EST, LEVL II, 10-19 MIN: ICD-10-PCS | Mod: S$PBB,,, | Performed by: UROLOGY

## 2022-11-30 PROCEDURE — 1160F PR REVIEW ALL MEDS BY PRESCRIBER/CLIN PHARMACIST DOCUMENTED: ICD-10-PCS | Mod: CPTII,,, | Performed by: UROLOGY

## 2022-11-30 PROCEDURE — 3078F DIAST BP <80 MM HG: CPT | Mod: CPTII,,, | Performed by: UROLOGY

## 2022-11-30 PROCEDURE — 99215 OFFICE O/P EST HI 40 MIN: CPT | Mod: PBBFAC | Performed by: UROLOGY

## 2022-11-30 PROCEDURE — 4010F ACE/ARB THERAPY RXD/TAKEN: CPT | Mod: CPTII,,, | Performed by: UROLOGY

## 2022-11-30 PROCEDURE — 1159F MED LIST DOCD IN RCRD: CPT | Mod: CPTII,,, | Performed by: UROLOGY

## 2022-11-30 PROCEDURE — 3008F BODY MASS INDEX DOCD: CPT | Mod: CPTII,,, | Performed by: UROLOGY

## 2022-11-30 PROCEDURE — 3074F PR MOST RECENT SYSTOLIC BLOOD PRESSURE < 130 MM HG: ICD-10-PCS | Mod: CPTII,,, | Performed by: UROLOGY

## 2022-11-30 PROCEDURE — 3008F PR BODY MASS INDEX (BMI) DOCUMENTED: ICD-10-PCS | Mod: CPTII,,, | Performed by: UROLOGY

## 2022-11-30 PROCEDURE — 1159F PR MEDICATION LIST DOCUMENTED IN MEDICAL RECORD: ICD-10-PCS | Mod: CPTII,,, | Performed by: UROLOGY

## 2022-11-30 PROCEDURE — 1160F RVW MEDS BY RX/DR IN RCRD: CPT | Mod: CPTII,,, | Performed by: UROLOGY

## 2022-11-30 PROCEDURE — 4010F PR ACE/ARB THEARPY RXD/TAKEN: ICD-10-PCS | Mod: CPTII,,, | Performed by: UROLOGY

## 2022-11-30 PROCEDURE — 99212 OFFICE O/P EST SF 10 MIN: CPT | Mod: S$PBB,,, | Performed by: UROLOGY

## 2022-11-30 NOTE — PROGRESS NOTES
Patient seen by Dr. CHAVEZ Kamara. Will return in 6 months with PSA. Written and verbal discharge instructions given.

## 2022-11-30 NOTE — PROGRESS NOTES
CC:  PSA follow-up    HPI:  Nicole Garcia is a 62 y.o. male seen for follow-up of elevated PSA.  The patient has a history of an elevated PSA and had a prostate biopsy in April of 2022.  That biopsy was negative.  He then had a PSA in May of 2022 and that was 5.88.  On follow-up on 26 October 2022 his PSA had increased to 7.92.  He had no complaints at that time and has no complaints today.  I treated him with Bactrim DS and he had a repeat PSA which is back to baseline at 5.69.    Lab Results:  Recent Labs     11/28/22  0806   PSA 5.69*       ROS:  All systems reviewed and are negative except as documented in HPI and/or Assessment and Plan.     Patient Active Problem List:     Patient Active Problem List   Diagnosis    HFrEF (heart failure with reduced ejection fraction)    CAD (coronary artery disease)    Atrial fibrillation    Acquired hypothyroidism    Former smoker    HLD (hyperlipidemia)        Past Medical History:  Past Medical History:   Diagnosis Date    Atrial fibrillation     Coronary artery disease     Hyperlipidemia     Hypertension         Past Surgical History:  Past Surgical History:   Procedure Laterality Date    CARDIAC CATHETERIZATION      CORONARY ANGIOPLASTY WITH STENT PLACEMENT          Family History:  Family History   Problem Relation Age of Onset    Heart disease Mother     Stroke Mother     Heart attack Mother     Heart failure Mother     Heart disease Father     Stroke Father     Heart attack Father     Heart failure Father     Heart failure Sister     Heart disease Brother     Stroke Brother     Heart failure Brother     Arrhythmia Brother         Social History:  Social History     Socioeconomic History    Marital status:     Number of children: 1   Tobacco Use    Smoking status: Former     Types: Cigarettes    Smokeless tobacco: Never   Substance and Sexual Activity    Alcohol use: Yes     Alcohol/week: 2.0 standard drinks     Types: 2 Shots of liquor per week    Drug use: Not  Currently    Sexual activity: Not Currently     Social Determinants of Health     Financial Resource Strain: Low Risk     Difficulty of Paying Living Expenses: Not very hard   Food Insecurity: No Food Insecurity    Worried About Running Out of Food in the Last Year: Never true    Ran Out of Food in the Last Year: Never true   Transportation Needs: No Transportation Needs    Lack of Transportation (Medical): No    Lack of Transportation (Non-Medical): No   Physical Activity: Inactive    Days of Exercise per Week: 0 days    Minutes of Exercise per Session: 0 min   Stress: No Stress Concern Present    Feeling of Stress : Not at all   Social Connections: Socially Isolated    Frequency of Communication with Friends and Family: Three times a week    Frequency of Social Gatherings with Friends and Family: Once a week    Attends Sikh Services: Never    Active Member of Clubs or Organizations: No    Attends Club or Organization Meetings: Never    Marital Status:    Housing Stability: Low Risk     Unable to Pay for Housing in the Last Year: No    Number of Places Lived in the Last Year: 1    Unstable Housing in the Last Year: No        Allergies:  Review of patient's allergies indicates:   Allergen Reactions    Egg derived Anaphylaxis    Flu vac 2012 (18-64yrs)(pf)      Any thing with eggs   Stop breathing    Influenza virus vaccines      Other reaction(s): Allergy to eggs., Allergy to eggs.        Objective:  Vitals:    11/30/22 0813   BP: 100/67   Pulse: 65   Temp: 98.2 °F (36.8 °C)     General:  Well developed, well nourished adult male in no acute distress  Abdomen: Soft, nontender, no masses  Extremities:  No clubbing, cyanosis, or edema  Neurologic:  Grossly intact  Musculoskeletal:  Normal tone    Assessment:  1. Elevated PSA  - PSA, Total (Diagnostic); Future     Plan:  PSA is now back to baseline so will continue to follow this.  We will repeat a PSA in six months at which time he will also be due for a  ALEXANDER.     Follow-up:  Six months after a PSA for ALEXANDER.

## 2022-12-08 ENCOUNTER — CLINICAL SUPPORT (OUTPATIENT)
Dept: CARDIOLOGY | Facility: CLINIC | Age: 62
End: 2022-12-08
Payer: MEDICAID

## 2022-12-08 DIAGNOSIS — I50.9 CONGESTIVE HEART FAILURE, UNSPECIFIED HF CHRONICITY, UNSPECIFIED HEART FAILURE TYPE: Primary | ICD-10-CM

## 2022-12-08 PROCEDURE — 99211 OFF/OP EST MAY X REQ PHY/QHP: CPT | Mod: PBBFAC

## 2022-12-08 PROCEDURE — 93283 PRGRMG EVAL IMPLANTABLE DFB: CPT | Mod: PBBFAC

## 2023-01-24 ENCOUNTER — OFFICE VISIT (OUTPATIENT)
Dept: CARDIOLOGY | Facility: CLINIC | Age: 63
End: 2023-01-24
Payer: MEDICAID

## 2023-01-24 VITALS
DIASTOLIC BLOOD PRESSURE: 69 MMHG | BODY MASS INDEX: 26.08 KG/M2 | OXYGEN SATURATION: 99 % | HEART RATE: 67 BPM | HEIGHT: 65 IN | SYSTOLIC BLOOD PRESSURE: 108 MMHG | WEIGHT: 156.5 LBS | RESPIRATION RATE: 20 BRPM | TEMPERATURE: 98 F

## 2023-01-24 DIAGNOSIS — Z87.891 FORMER SMOKER: Chronic | ICD-10-CM

## 2023-01-24 DIAGNOSIS — E78.5 HYPERLIPIDEMIA, UNSPECIFIED HYPERLIPIDEMIA TYPE: Chronic | ICD-10-CM

## 2023-01-24 DIAGNOSIS — E03.9 ACQUIRED HYPOTHYROIDISM: Chronic | ICD-10-CM

## 2023-01-24 DIAGNOSIS — I50.20 HFREF (HEART FAILURE WITH REDUCED EJECTION FRACTION): ICD-10-CM

## 2023-01-24 DIAGNOSIS — I48.0 PAROXYSMAL ATRIAL FIBRILLATION: Primary | ICD-10-CM

## 2023-01-24 DIAGNOSIS — I25.10 CORONARY ARTERY DISEASE, UNSPECIFIED VESSEL OR LESION TYPE, UNSPECIFIED WHETHER ANGINA PRESENT, UNSPECIFIED WHETHER NATIVE OR TRANSPLANTED HEART: ICD-10-CM

## 2023-01-24 PROCEDURE — 93005 ELECTROCARDIOGRAM TRACING: CPT

## 2023-01-24 PROCEDURE — 99214 OFFICE O/P EST MOD 30 MIN: CPT | Mod: PBBFAC | Performed by: INTERNAL MEDICINE

## 2023-01-24 RX ORDER — SPIRONOLACTONE 25 MG/1
25 TABLET ORAL DAILY
Qty: 90 TABLET | Refills: 3 | Status: SHIPPED | OUTPATIENT
Start: 2023-01-24 | End: 2024-02-14 | Stop reason: SDUPTHER

## 2023-01-24 RX ORDER — BUMETANIDE 2 MG/1
2 TABLET ORAL 2 TIMES DAILY
Qty: 180 TABLET | Refills: 3 | Status: SHIPPED | OUTPATIENT
Start: 2023-01-24 | End: 2024-02-20 | Stop reason: SDUPTHER

## 2023-01-24 RX ORDER — AMIODARONE HYDROCHLORIDE 200 MG/1
200 TABLET ORAL DAILY
Qty: 90 TABLET | Refills: 3 | Status: SHIPPED | OUTPATIENT
Start: 2023-01-24 | End: 2024-02-02 | Stop reason: SDUPTHER

## 2023-01-24 RX ORDER — CLOPIDOGREL BISULFATE 75 MG/1
75 TABLET ORAL DAILY
Qty: 90 TABLET | Refills: 3 | Status: SHIPPED | OUTPATIENT
Start: 2023-01-24 | End: 2024-01-31 | Stop reason: SDUPTHER

## 2023-01-24 RX ORDER — SACUBITRIL AND VALSARTAN 24; 26 MG/1; MG/1
1 TABLET, FILM COATED ORAL 2 TIMES DAILY
Qty: 180 TABLET | Refills: 3 | Status: SHIPPED | OUTPATIENT
Start: 2023-01-24 | End: 2024-02-14 | Stop reason: SDUPTHER

## 2023-01-24 RX ORDER — METOPROLOL SUCCINATE 50 MG/1
50 TABLET, EXTENDED RELEASE ORAL 2 TIMES DAILY
Qty: 180 TABLET | Refills: 3 | Status: SHIPPED | OUTPATIENT
Start: 2023-01-24 | End: 2023-04-17 | Stop reason: SDUPTHER

## 2023-01-24 NOTE — PROGRESS NOTES
[facility] Cardiology Office Visit Note      CHIEF COMPLAINT:   No chief complaint on file.             HPI:  Nicole Garcia 62 y.o. male with PMH of ischemic cardiomyopathy (EF 10%), Afib, HTN, HLD, CAD s/p PCI to LAD 5/2021 who is presenting to [facility] Cardiology clinic for 3-month follow up, LCV was 10/20/22. Denies any CP, SOB, palpitations, dizziness, leg swelling, syncope. Can walk from parking lot to clinic without any SOB,CP, palpitations. Denies any prescyncope/synopal episodes or LE edema. Complaint with all medications.                                                                                                                                                                                                                                                                                                                                                                                                       CARDIAC TESTING:  PCI to LAD in May 2021    TTE 12/2022 - EF 10%     Results for orders placed in visit on 08/29/18    Past Surgical History:   Procedure Laterality Date    CARDIAC CATHETERIZATION      CORONARY ANGIOPLASTY WITH STENT PLACEMENT       Social History     Socioeconomic History    Marital status:     Number of children: 1   Tobacco Use    Smoking status: Former     Types: Cigarettes    Smokeless tobacco: Never   Substance and Sexual Activity    Alcohol use: Yes     Alcohol/week: 2.0 standard drinks     Types: 2 Shots of liquor per week    Drug use: Not Currently    Sexual activity: Not Currently     Social Determinants of Health     Financial Resource Strain: Low Risk     Difficulty of Paying Living Expenses: Not very hard   Food Insecurity: No Food Insecurity    Worried About Running Out of Food in the Last Year: Never true    Ran Out of Food in the Last Year: Never true   Transportation Needs: No Transportation Needs    Lack of Transportation (Medical): No    Lack of Transportation  (Non-Medical): No   Physical Activity: Inactive    Days of Exercise per Week: 0 days    Minutes of Exercise per Session: 0 min   Stress: No Stress Concern Present    Feeling of Stress : Not at all   Social Connections: Socially Isolated    Frequency of Communication with Friends and Family: Three times a week    Frequency of Social Gatherings with Friends and Family: Once a week    Attends Zoroastrian Services: Never    Active Member of Clubs or Organizations: No    Attends Club or Organization Meetings: Never    Marital Status:    Housing Stability: Low Risk     Unable to Pay for Housing in the Last Year: No    Number of Places Lived in the Last Year: 1    Unstable Housing in the Last Year: No        Family History   Problem Relation Age of Onset    Heart disease Mother     Stroke Mother     Heart attack Mother     Heart failure Mother     Heart disease Father     Stroke Father     Heart attack Father     Heart failure Father     Heart failure Sister     Heart disease Brother     Stroke Brother     Heart failure Brother     Arrhythmia Brother          Current Outpatient Medications:     albuterol (PROVENTIL/VENTOLIN HFA) 90 mcg/actuation inhaler, Inhale 2 puffs into the lungs every 4 (four) hours as needed for Wheezing., Disp: 18 g, Rfl: 11    amiodarone (PACERONE) 200 MG Tab, Take 1 tablet (200 mg total) by mouth once daily., Disp: 90 tablet, Rfl: 3    atorvastatin (LIPITOR) 80 MG tablet, Take 1 tablet (80 mg total) by mouth once daily., Disp: 90 tablet, Rfl: 3    bumetanide (BUMEX) 2 MG tablet, Take 1 tablet by mouth 2 (two) times daily., Disp: , Rfl:     clopidogreL (PLAVIX) 75 mg tablet, Take 75 mg by mouth once daily., Disp: , Rfl:     levothyroxine (SYNTHROID) 75 MCG tablet, Take 0.5 tablets (37.5 mcg total) by mouth once daily., Disp: 90 tablet, Rfl: 3    metoprolol succinate (TOPROL-XL) 50 MG 24 hr tablet, Take 50 mg by mouth 2 (two) times daily., Disp: , Rfl:     potassium chloride (KLOR-CON) 10 MEQ  "TbSR, Take 10 mEq by mouth 2 (two) times daily., Disp: , Rfl:     rivaroxaban (XARELTO) 20 mg Tab, Take 20 mg by mouth daily with dinner or evening meal., Disp: , Rfl:     sacubitriL-valsartan (ENTRESTO) 24-26 mg per tablet, Take 1 tablet by mouth., Disp: , Rfl:     spironolactone (ALDACTONE) 25 MG tablet, Take 25 mg by mouth once daily., Disp: , Rfl:     vitamin D (VITAMIN D3) 1000 units Tab, Take 1,000 Units by mouth once daily., Disp: , Rfl:     benzonatate (TESSALON) 200 MG capsule, Take 1 capsule (200 mg total) by mouth 3 (three) times daily as needed for Cough. (Patient not taking: Reported on 1/24/2023), Disp: 20 capsule, Rfl: 0    ergocalciferol (ERGOCALCIFEROL) 50,000 unit Cap, Take 1 capsule by mouth every 7 days., Disp: , Rfl:    Xarelto 20mg daily    ROS:                                                                                                                                                                             Constitutional: no fever, fatigue, weakness  Eye: no vision loss, eye redness, drainage, or pain  ENMT: no sore throat, ear pain, sinus pain/congestion, nasal congestion/drainage  Respiratory: no cough, no wheezing, no shortness of breath  Cardiovascular: no chest pain, no palpitations, no edema  Gastrointestinal: no nausea, vomiting, or diarrhea. No abdominal pain, melena, hematochezia  Genitourinary: no dysuria, no urinary frequency or urgency, no hematuria  Hema/Lymph: no abnormal bruising or bleeding  Endocrine: no heat or cold intolerance, no excessive thirst or excessive urination  Musculoskeletal: no muscle or joint pain, no joint swelling  Integumentary: no skin rash or abnormal lesion  Neurologic: no headache, no dizziness, no weakness or numbness      Blood pressure 108/69, pulse 67, temperature 98.2 °F (36.8 °C), temperature source Oral, resp. rate 20, height 5' 5" (1.651 m), weight 71 kg (156 lb 8.4 oz), SpO2 99 %.     PE:   General: well-developed well-nourished in no " acute distress  Eye: PERRLA, EOMI, clear conjunctiva, eyelids normal  HENT: NC/AT, moist mucus membranes  Neck: full range of motion, no thyromegaly or lymphadenopathy  Respiratory: clear to auscultation bilaterally, respirations non-labored  Cardiovascular: regular rate and rhythm without murmurs, gallops or rubs  Gastrointestinal: soft, non-tender, non-distended with normal bowel sounds, without masses to palpation  Musculoskeletal: no obvious deformities, full range of motion of all extremities/spine without limitation or discomfort  Integumentary: no rashes or skin lesions present  Extremities: radial and DP pulses 2+ bilaterally, no LE edema  Neurologic: CN II-XII intact, no signs of peripheral neurological deficit, motor/sensory function intact          ASSESSMENT/PLAN:    ICMO s/p PCI [facility] LAD May 2021 in [facility]  HFrEF ( EF 10%, 12/2021 TTE )  NYHA class II  -continue GDMT:  metoprolol succinate 50mg BID, Entresto 24/26 BID, Aldactone 25mg daily  -continue Plavix 75mg, Atorvastatin 80mg, Bumex 2mg BID,   -ICD Device last interrogated 03/10/2022 noting 2 episodes of VTACH,terminated with ATP, no shocks delivered. Unable to locate Dec 2022 interrogation report however pt states he had it  -Symptoms well-controlled at this time  -Continue risk-factor modication, low-salt diet, and continued tobacco abstinence    Afib-rate controlled  -continue metoprolol 50mg BID, amiodarone 200mg daily, Xarelto 20mg daily  -EKG today shows SR with 1st degree AV block w/ PACs, aberrant conduction    Hx V-Tach  -continue amiodarone 200mg daily  -LFT and TSH q6 months while on amiodarone    HTN, HLD  -LDL 57 @ goal, continue statin therapy   -F/u with PCP    Hx Hypothyroidism  -Continue uptitrating synthroid until TSH within goal, defer to PCP    3 month f/u with LFTs and TSH, Needs interrogation of ICD     Aric Shirley MD  Internal Medicine Resident PGY II

## 2023-01-24 NOTE — PROGRESS NOTES
Cardiology Attending    I evaluated Nicole Garcia and discussed the patient's symptoms, findings, and management plan with the resident.  Please see the Cardiology note for details.

## 2023-01-26 ENCOUNTER — TELEPHONE (OUTPATIENT)
Dept: INTERNAL MEDICINE | Facility: CLINIC | Age: 63
End: 2023-01-26
Payer: MEDICAID

## 2023-01-26 DIAGNOSIS — Z12.11 COLON CANCER SCREENING: Primary | ICD-10-CM

## 2023-01-26 NOTE — TELEPHONE ENCOUNTER
Pt had + cologuard screening, had previously notified of + screen and patient was apprehensive given bleeding previously on DOAC/Plavix for his A-fib and CAD. Discussed again with patient who agreed to undergo colonoscopy. Urgent referral placed.    Aric Shirley MD  [employer] IM PGY II

## 2023-03-08 ENCOUNTER — TELEPHONE (OUTPATIENT)
Dept: CARDIOLOGY | Facility: CLINIC | Age: 63
End: 2023-03-08
Payer: MEDICAID

## 2023-03-08 DIAGNOSIS — I50.20 HFREF (HEART FAILURE WITH REDUCED EJECTION FRACTION): ICD-10-CM

## 2023-03-08 DIAGNOSIS — I50.20 HFREF (HEART FAILURE WITH REDUCED EJECTION FRACTION): Primary | ICD-10-CM

## 2023-03-08 DIAGNOSIS — Z79.899 HIGH RISK MEDICATION USE: Primary | ICD-10-CM

## 2023-03-08 RX ORDER — POTASSIUM CHLORIDE 750 MG/1
10 TABLET, EXTENDED RELEASE ORAL 2 TIMES DAILY
Qty: 60 TABLET | Refills: 0 | Status: SHIPPED | OUTPATIENT
Start: 2023-03-08 | End: 2023-04-17 | Stop reason: SDUPTHER

## 2023-03-08 NOTE — TELEPHONE ENCOUNTER
Orders placed. Patient has an appointment for device check in the morning. He states he will complete labs after visit.     ----- Message from Tk Woodard MD sent at 3/8/2023  1:01 PM CST -----  Regarding: Labs  This patient has requested refills on medication(s).  The patient being on medications that affect:  Electrolytes     The patient needs the following labs checked:  BMP

## 2023-03-09 ENCOUNTER — LAB VISIT (OUTPATIENT)
Dept: LAB | Facility: HOSPITAL | Age: 63
End: 2023-03-09
Attending: INTERNAL MEDICINE
Payer: MEDICAID

## 2023-03-09 ENCOUNTER — CLINICAL SUPPORT (OUTPATIENT)
Dept: CARDIOLOGY | Facility: CLINIC | Age: 63
End: 2023-03-09
Payer: MEDICAID

## 2023-03-09 DIAGNOSIS — I50.20 HFREF (HEART FAILURE WITH REDUCED EJECTION FRACTION): ICD-10-CM

## 2023-03-09 DIAGNOSIS — I50.9 CONGESTIVE HEART FAILURE, UNSPECIFIED HF CHRONICITY, UNSPECIFIED HEART FAILURE TYPE: Primary | ICD-10-CM

## 2023-03-09 DIAGNOSIS — I50.9 CONGESTIVE HEART FAILURE, UNSPECIFIED HF CHRONICITY, UNSPECIFIED HEART FAILURE TYPE: ICD-10-CM

## 2023-03-09 DIAGNOSIS — Z79.899 HIGH RISK MEDICATION USE: ICD-10-CM

## 2023-03-09 LAB
ANION GAP SERPL CALC-SCNC: 10 MEQ/L
BUN SERPL-MCNC: 13.8 MG/DL (ref 8.4–25.7)
CALCIUM SERPL-MCNC: 9.6 MG/DL (ref 8.8–10)
CHLORIDE SERPL-SCNC: 97 MMOL/L (ref 98–107)
CO2 SERPL-SCNC: 29 MMOL/L (ref 23–31)
CREAT SERPL-MCNC: 1.43 MG/DL (ref 0.73–1.18)
CREAT/UREA NIT SERPL: 10
GFR SERPLBLD CREATININE-BSD FMLA CKD-EPI: 56 MLS/MIN/1.73/M2
GLUCOSE SERPL-MCNC: 87 MG/DL (ref 82–115)
MAGNESIUM SERPL-MCNC: 2 MG/DL (ref 1.6–2.6)
POTASSIUM SERPL-SCNC: 3.4 MMOL/L (ref 3.5–5.1)
SODIUM SERPL-SCNC: 136 MMOL/L (ref 136–145)

## 2023-03-09 PROCEDURE — 83735 ASSAY OF MAGNESIUM: CPT

## 2023-03-09 PROCEDURE — 36415 COLL VENOUS BLD VENIPUNCTURE: CPT

## 2023-03-09 PROCEDURE — 80048 BASIC METABOLIC PNL TOTAL CA: CPT

## 2023-03-09 PROCEDURE — 93283 PRGRMG EVAL IMPLANTABLE DFB: CPT | Mod: PBBFAC | Performed by: INTERNAL MEDICINE

## 2023-03-09 PROCEDURE — 99211 OFF/OP EST MAY X REQ PHY/QHP: CPT | Mod: PBBFAC,59

## 2023-04-17 ENCOUNTER — OFFICE VISIT (OUTPATIENT)
Dept: INTERNAL MEDICINE | Facility: CLINIC | Age: 63
End: 2023-04-17
Payer: MEDICAID

## 2023-04-17 VITALS
SYSTOLIC BLOOD PRESSURE: 110 MMHG | RESPIRATION RATE: 20 BRPM | HEIGHT: 65 IN | OXYGEN SATURATION: 98 % | WEIGHT: 158.31 LBS | HEART RATE: 65 BPM | DIASTOLIC BLOOD PRESSURE: 74 MMHG | TEMPERATURE: 98 F | BODY MASS INDEX: 26.38 KG/M2

## 2023-04-17 DIAGNOSIS — I50.20 HFREF (HEART FAILURE WITH REDUCED EJECTION FRACTION): ICD-10-CM

## 2023-04-17 DIAGNOSIS — I25.10 CORONARY ARTERY DISEASE, UNSPECIFIED VESSEL OR LESION TYPE, UNSPECIFIED WHETHER ANGINA PRESENT, UNSPECIFIED WHETHER NATIVE OR TRANSPLANTED HEART: ICD-10-CM

## 2023-04-17 DIAGNOSIS — R19.5 POSITIVE COLORECTAL CANCER SCREENING USING COLOGUARD TEST: ICD-10-CM

## 2023-04-17 DIAGNOSIS — E03.9 ACQUIRED HYPOTHYROIDISM: Chronic | ICD-10-CM

## 2023-04-17 DIAGNOSIS — E78.5 HYPERLIPIDEMIA, UNSPECIFIED HYPERLIPIDEMIA TYPE: Primary | Chronic | ICD-10-CM

## 2023-04-17 DIAGNOSIS — I48.20 CHRONIC ATRIAL FIBRILLATION: ICD-10-CM

## 2023-04-17 PROCEDURE — 99215 OFFICE O/P EST HI 40 MIN: CPT | Mod: PBBFAC

## 2023-04-17 RX ORDER — POTASSIUM CHLORIDE 750 MG/1
10 TABLET, EXTENDED RELEASE ORAL 2 TIMES DAILY
Qty: 60 TABLET | Refills: 0 | Status: SHIPPED | OUTPATIENT
Start: 2023-04-17 | End: 2023-05-17 | Stop reason: SDUPTHER

## 2023-04-17 RX ORDER — LEVOTHYROXINE SODIUM 50 UG/1
50 TABLET ORAL DAILY
Qty: 30 TABLET | Refills: 11 | Status: SHIPPED | OUTPATIENT
Start: 2023-04-17 | End: 2023-06-01

## 2023-04-17 RX ORDER — METOPROLOL SUCCINATE 50 MG/1
50 TABLET, EXTENDED RELEASE ORAL 2 TIMES DAILY
Qty: 180 TABLET | Refills: 3 | Status: SHIPPED | OUTPATIENT
Start: 2023-04-17

## 2023-04-17 NOTE — PROGRESS NOTES
"[facility] Clinic Visit    Chief Complaint:      Follow-up (Pt here today for 6mth f/u visit. No distress noted at this time. )     Subjective:     HPI:  Nicole Garcia is a 61 year old male with PMH of HFrEF w/ EF 10% 2/2 ICMO, CAD with hx of stent x1 to LAD in June 2021, HLD, HTN, A-fib, former smoker who is returning to clinic for follow up, last seen 10/3/22. No complaints today, requesting refills of medications. Denies chest pain, shortness of breath, palpitations, orthopnea, [place]. States he has been compliant with his medicaitons including synthroid which he was previously not taking. Will redraw TSH, today, send GI referral for c-scope to Dr. Botello as [facility] too busy.      Review of Systems  Constitutional: no fever, fatigue, weakness  Eye: no vision loss, eye redness, drainage, or pain  ENMT: no sore throat, ear pain, sinus pain/congestion, nasal congestion/drainage  Respiratory: no cough, no wheezing, no shortness of breath  Cardiovascular: no chest pain, no palpitations, no edema  Gastrointestinal: no nausea, vomiting, or diarrhea. No abdominal pain  Genitourinary: no dysuria, no urinary frequency or urgency, no hematuria  Hema/Lymph: no abnormal bruising or bleeding  Endocrine: no heat or cold intolerance, no excessive thirst or excessive urination  Musculoskeletal: no muscle or joint pain, no joint swelling  Integumentary: no skin rash or abnormal lesion  Neurologic: no headache, no dizziness, no weakness or numbness    Objective:   Last 24 Hour Vital Signs:  Vitals  BP: 110/74  Temp: 98.4 °F (36.9 °C)  Temp Source: Oral  Pulse: 65  Resp: 20  SpO2: 98 %  Height: 5' 5" (165.1 cm)  Weight: 71.8 kg (158 lb 4.6 oz)    Physical Examination:    General: well-developed well-nourished  male in no acute distress  Eye: PERRLA, EOMI, clear conjunctiva, eyelids normal  HENT: NC/AT, moist mucus membranes  Neck: full range of motion, no thyromegaly or lymphadenopathy  Respiratory: clear to " auscultation bilaterally, respirations nonlabored  Cardiovascular: regular rate and rhythm without murmurs, gallops or rubs, ICD to left chest wall  Gastrointestinal: soft, non-tender, non-distended with normal bowel sounds, without masses to palpation  Musculoskeletal: full range of motion of all extremities/spine without limitation or discomfort  Integumentary: warm to touch, no rashes or skin lesions present  Extremities: distal pulses intact, no peripheral edema noted  Neurologic: AAOx4, CN II-XII grossly intact, no signs of peripheral neurological deficit, motor/sensory function intact    Labs  BMP:   Lab Results   Component Value Date    CHLORIDE 97 (L) 03/09/2023    CO2 29 03/09/2023    BUN 13.8 03/09/2023    CREATININE 1.43 (H) 03/09/2023    GLUCOSE 87 03/09/2023    CALCIUM 9.6 03/09/2023     CBC:   Lab Results   Component Value Date    WBC 9.9 04/20/2022    HGB 14.3 04/20/2022    HCT 44.4 04/20/2022    MCV 90.2 04/20/2022    RDW 15.5 04/20/2022     LFTs:   Lab Results   Component Value Date    LABPROT 7.5 10/20/2022    ALBUMIN 4.4 10/20/2022    AST 23 10/20/2022    ALT 14 10/20/2022    ALKPHOS 151 (H) 10/20/2022     FLP:   Lab Results   Component Value Date    CHOL 136 10/03/2022    HDL 35 10/03/2022    LDL 57.00 10/03/2022    TRIG 219 (H) 10/03/2022    TOTALCHOLEST 4 10/03/2022     DM:   Lab Results   Component Value Date    HGBA1C 5.8 02/15/2022    .8 02/15/2022    LDLCALC 6 05/08/2021    CREATININE 1.43 (H) 03/09/2023    CREATRANDUR 197.1 (H) 05/02/2021    PROTEINURINE 23.6 05/02/2021     Thyroid:   Lab Results   Component Value Date    TSH 8.5103 (H) 10/03/2022    [id number] 1.25 10/03/2022     Anemia: No results found for: IRON, TIBC, FERRITIN, [id number], FOLATE          Assessment & Plan:     HFrEF 2/2 ICMO with EF less than 10% status post ICD  -CAD with recent high risk PCI May 2021 to LAD x1  -Follows with cardiology here  -Continue GDMT: Entresto 24/26 mg BID, metoprolol 50 mg BID,  Aldactone 25 mg daily, KCl 20 mEq daily, Bumex 2 mg BID    Chronic A-fib  -CHADSVASc 3, DAPT score 6  -On Plavix and Xarelto therapy per cardiology: Plavix 75 mg daily and Xarelto 20 mg daily  -Defer antiplatelet therapy changes to cardiology    Hypertension  -Pt's /74 at goal, continue GDMT as above    Hyperlipidemia  -2/15/22 with Total cholesterol: 223, HDL 39, LDL: 144, Trigl: 198  -Continue atorvastatin 80 mg daily     Hypothyroidism  -TSH elevated 8.513, FT4 WNL; redraw both today  -Will increase to 50 mcg daily, recheck in 6 weeks and adjust accordingly    Insomnia  -Pt still having sleep issues  -Melatonin not effective  -Instructed to try benadryl OTC 25 mg; consider Trazadone if still having problems at next visit    Former Smoker  -Pt states he has 44 pack-year hx quit 2 years ago  -Low dose lung CA screening ordered    Health Maintenance  -Lung CA screening: Low dose CT 3/16/22 WNL Lung-rads 2; repeat ordered  -Colon CA Screening: Cologuard+, did not have c-scope, states he never got call, switching referral to Dr. Botello today urgent referral  -Vaccinations:   -PNA: needs PSV23 repeated now and Prevnar 13 at age 66- refusing both   -TdAP: needs, refusing   -Covid: needs, refusing   -Flu: needs, refusing    To be addressed at next follow-up  -C-scope completion      Follow-ups  -Follow-up medicine clinic in 6 months      Aric Shirley MD  [employer] IM PGY II

## 2023-05-17 DIAGNOSIS — I50.20 HFREF (HEART FAILURE WITH REDUCED EJECTION FRACTION): ICD-10-CM

## 2023-05-19 RX ORDER — POTASSIUM CHLORIDE 750 MG/1
10 TABLET, EXTENDED RELEASE ORAL 2 TIMES DAILY
Qty: 180 TABLET | Refills: 3 | Status: SHIPPED | OUTPATIENT
Start: 2023-05-19 | End: 2023-05-23 | Stop reason: SDUPTHER

## 2023-05-23 ENCOUNTER — OFFICE VISIT (OUTPATIENT)
Dept: CARDIOLOGY | Facility: CLINIC | Age: 63
End: 2023-05-23
Payer: MEDICAID

## 2023-05-23 VITALS
SYSTOLIC BLOOD PRESSURE: 111 MMHG | WEIGHT: 156.31 LBS | DIASTOLIC BLOOD PRESSURE: 69 MMHG | OXYGEN SATURATION: 98 % | HEART RATE: 67 BPM | RESPIRATION RATE: 18 BRPM | TEMPERATURE: 98 F | HEIGHT: 65 IN | BODY MASS INDEX: 26.04 KG/M2

## 2023-05-23 DIAGNOSIS — I25.10 ATHEROSCLEROSIS OF NATIVE CORONARY ARTERY OF NATIVE HEART WITHOUT ANGINA PECTORIS: ICD-10-CM

## 2023-05-23 DIAGNOSIS — I50.20 HFREF (HEART FAILURE WITH REDUCED EJECTION FRACTION): Primary | ICD-10-CM

## 2023-05-23 DIAGNOSIS — I10 PRIMARY HYPERTENSION: ICD-10-CM

## 2023-05-23 DIAGNOSIS — I48.0 PAROXYSMAL ATRIAL FIBRILLATION: ICD-10-CM

## 2023-05-23 PROBLEM — E78.2 MIXED HYPERLIPIDEMIA: Status: ACTIVE | Noted: 2022-07-12

## 2023-05-23 PROCEDURE — 99214 OFFICE O/P EST MOD 30 MIN: CPT | Mod: PBBFAC | Performed by: INTERNAL MEDICINE

## 2023-05-23 RX ORDER — POTASSIUM CHLORIDE 750 MG/1
10 TABLET, EXTENDED RELEASE ORAL 2 TIMES DAILY
Qty: 180 TABLET | Refills: 3 | Status: SHIPPED | OUTPATIENT
Start: 2023-05-23 | End: 2024-05-22

## 2023-05-23 NOTE — PROGRESS NOTES
05/23/2023 3:53 PM    Subjective:     CHIEF COMPLAINT:   Chief Complaint   Patient presents with    Follow-up     3 mos f/u w/labs denies cardiac targets                           HPI:    Mr. Nicole Garcia is a 62 y.o. male.      Today the patient comes for a follow-up appointment.    CP:  The patient has no chest discomfort.      SOB:  The patient denies shortness of breath No SANFORD    EDEMA:  The patient denies edema.        ORTHOPNEA:  The patient denies orthopnea.  No PND.      SYNCOPE:  The patient denies near syncope.  No syncope.   Gets lightheaded if stoop over and get up too fast.     PALPITATIONS:  The patient has no palpitations.    LEVEL OF EXERTION:  The patient does house work.  The patient does not have symptoms with this level of exertion.  The patient's level of exertion is adequate.  METS:  The patient does the following activities:    walk (3 METS)    DATA FOR RCRI:  The patient:   Denies any upcoming procedure/surgery  Based on medical records:  Has CAD.  (MI, Abnormal stress test, Angina, Use of NTG, MI on EKG)   Based on medical records:  Has CHF  Based on medical records:  No TIA or CVA.  Based on medical records:  Is not being treated with insulin.   Creatinine is not > 2 mg/dL   Lab Results   Component Value Date    CREATININE 1.58 (H) 04/17/2023    CREATININE 1.43 (H) 03/09/2023    CREATININE 1.48 (H) 06/21/2022       The patient's RCRI is 2.  Based on this score the patient's 30-day risk of death, MI, or cardiac arrest with surgery is 10.1%.  The patient has intermediate risk for surgery.    Past Medical History    Patient Active Problem List   Diagnosis    HFrEF (heart failure with reduced ejection fraction)    Atherosclerosis of native coronary artery without angina pectoris    Paroxysmal atrial fibrillation    Acquired hypothyroidism    Former smoker    Mixed hyperlipidemia    Primary hypertension       Surgical History    Past Surgical History:   Procedure Laterality Date    CARDIAC  CATHETERIZATION      CORONARY ANGIOPLASTY WITH STENT PLACEMENT         Social History     Socioeconomic History    Marital status:     Number of children: 1   Tobacco Use    Smoking status: Former     Types: Cigarettes    Smokeless tobacco: Never   Substance and Sexual Activity    Alcohol use: Yes     Alcohol/week: 2.0 standard drinks     Types: 2 Shots of liquor per week    Drug use: Not Currently    Sexual activity: Not Currently     Social Determinants of Health     Financial Resource Strain: Low Risk     Difficulty of Paying Living Expenses: Not very hard   Food Insecurity: No Food Insecurity    Worried About Running Out of Food in the Last Year: Never true    Ran Out of Food in the Last Year: Never true   Transportation Needs: No Transportation Needs    Lack of Transportation (Medical): No    Lack of Transportation (Non-Medical): No   Physical Activity: Inactive    Days of Exercise per Week: 0 days    Minutes of Exercise per Session: 0 min   Stress: No Stress Concern Present    Feeling of Stress : Not at all   Social Connections: Socially Isolated    Frequency of Communication with Friends and Family: Three times a week    Frequency of Social Gatherings with Friends and Family: Once a week    Attends Mu-ism Services: Never    Active Member of Clubs or Organizations: No    Attends Club or Organization Meetings: Never    Marital Status:    Housing Stability: Low Risk     Unable to Pay for Housing in the Last Year: No    Number of Places Lived in the Last Year: 1    Unstable Housing in the Last Year: No       Family History   Problem Relation Age of Onset    Heart disease Mother     Stroke Mother     Heart attack Mother     Heart failure Mother     Heart disease Father     Stroke Father     Heart attack Father     Heart failure Father     Heart failure Sister     Heart disease Brother     Stroke Brother     Heart failure Brother     Arrhythmia Brother      Review of patient's allergies  "indicates:   Allergen Reactions    Egg derived Anaphylaxis    Flu vac 2012 (18-64yrs)(pf)      Any thing with eggs   Stop breathing    Influenza virus vaccines      Other reaction(s): Allergy to eggs., Allergy to eggs.       Current Medications    Current Outpatient Medications   Medication Instructions    albuterol (PROVENTIL/VENTOLIN HFA) 90 mcg/actuation inhaler 2 puffs, Inhalation, Every 4 hours PRN    amiodarone (PACERONE) 200 mg, Oral, Daily    atorvastatin (LIPITOR) 80 mg, Oral, Daily    benzonatate (TESSALON) 200 mg, Oral, 3 times daily PRN    bumetanide (BUMEX) 2 mg, Oral, 2 times daily    clopidogreL (PLAVIX) 75 mg, Oral, Daily    ergocalciferol (ERGOCALCIFEROL) 50,000 unit Cap 1 capsule, Oral, Every 7 days    levothyroxine (SYNTHROID) 50 mcg, Oral, Daily    metoprolol succinate (TOPROL-XL) 50 mg, Oral, 2 times daily    potassium chloride (KLOR-CON) 10 MEQ TbSR 10 mEq, Oral, 2 times daily    rivaroxaban (XARELTO) 20 mg, Oral, With dinner    sacubitriL-valsartan (ENTRESTO) 24-26 mg per tablet 1 tablet, Oral, 2 times daily    spironolactone (ALDACTONE) 25 mg, Oral, Daily    vitamin D (VITAMIN D3) 1,000 Units, Oral, Daily         ROS:   refer to HPI     Objective:     BP Readings from Last 3 Encounters:   05/23/23 111/69   04/17/23 110/74   01/24/23 108/69        Pulse Readings from Last 3 Encounters:   05/23/23 67   04/17/23 65   01/24/23 67        Temp Readings from Last 3 Encounters:   05/23/23 98.1 °F (36.7 °C)   04/17/23 98.4 °F (36.9 °C) (Oral)   01/24/23 98.2 °F (36.8 °C) (Oral)       Wt Readings from Last 3 Encounters:   05/23/23 70.9 kg (156 lb 4.9 oz)   04/17/23 71.8 kg (158 lb 4.6 oz)   01/24/23 71 kg (156 lb 8.4 oz)         PE:  Blood pressure 111/69, pulse 67, temperature 98.1 °F (36.7 °C), resp. rate 18, height 5' 5" (1.651 m), weight 70.9 kg (156 lb 4.9 oz), SpO2 98 %.   GENERAL:  Ambulates  CONSTITUTIONAL:  No acute distress.  Not ill appearing.  NECK:  No cervical adenopathy.  No carotid " bruit.  CARDIOVASCULAR:  Normal rate.  Regular rhythm.  No murmur.  PULMONARY:  No respiratory distress.  No wheezing.  No crackles.  ABDOMINAL:  No distention.  No tenderness.  MUSCULOSKELETAL:  No deformity.  No edema  SKIN:  No bruising.  No rash.  NEUROLOGICAL:  Oriented x 3.  No weakness.                                                                                                                                                                                                                                                                                                                                                                                                                                                                               CARDIAC TESTING:  Echocardiogram  No results found for this or any previous visit.      Stress Test  No results found for this or any previous visit.     Coronary Angiogram  Results for orders placed in visit on 08/29/18    CATH LAB PROCEDURE     Holter Monitor  No cardiac monitor results found for the past 12 months    Last BMP BMP  Lab Results   Component Value Date     04/17/2023    K 4.0 04/17/2023    CO2 31 04/17/2023    BUN 18.9 04/17/2023    CREATININE 1.58 (H) 04/17/2023    CALCIUM 10.0 04/17/2023    EGFRNORACEVR 49 04/17/2023      Last CBC     Lab Results   Component Value Date    WBC 9.3 04/17/2023    HGB 14.7 04/17/2023    HCT 45.3 04/17/2023    MCV 86.9 04/17/2023     (H) 04/17/2023           BNP    Lab Results   Component Value Date    BNP 1,057.6 (H) 12/16/2021    BNP 4,222.4 (H) 05/01/2021    BNP 6,473.5 (H) 04/28/2021     Last lipids    Lab Results   Component Value Date    CHOL 136 10/03/2022    CHOL 223 02/15/2022    CHOL 154 11/07/2021    HDL 35 10/03/2022    HDL 39 02/15/2022    HDL 28 (L) 11/07/2021    LDL 57.00 10/03/2022    .00 02/15/2022    LDL 94.00 11/07/2021    TRIG 219 (H) 10/03/2022    TRIG 198 02/15/2022    TRIG 161 (H)  11/07/2021    TOTALCHOLEST 4 10/03/2022    TOTALCHOLEST 6 02/15/2022    TOTALCHOLEST 6 (H) 11/07/2021      LFT     Lab Results   Component Value Date    ALT 19 04/17/2023    ALT 14 10/20/2022    ALT 19 06/21/2022    AST 22 04/17/2023    AST 23 10/20/2022    AST 21 06/21/2022       Assessment:     1. HFrEF (heart failure with reduced ejection fraction)    2. Atherosclerosis of native coronary artery of native heart without angina pectoris    3. Paroxysmal atrial fibrillation    4. Primary hypertension    10 Year Cardiovascular Risk:  The 10-year ASCVD risk score (Krystle ONEAL, et al., 2019) is: 6.7%    Values used to calculate the score:      Age: 62 years      Sex: Male      Is Non- : No      Diabetic: No      Tobacco smoker: No      Systolic Blood Pressure: 111 mmHg      Is BP treated: No      HDL Cholesterol: 35 mg/dL      Total Cholesterol: 136 mg/dL  BMI:  Body mass index is 26.01 kg/m².  Patient is overweight (BMI 25-29.0)  Tobacco:  denied  Alcohol:  2 glass per week  Substance abuse:  none     Pre-Op Cardiac Risk Assessment:  Please see RCRI above.  The patient is at low to moderate cardiac risk for a low to intermediate risk procedure.     Antiplatelets:  The patient is at low to moderate cardiac risk for holding Plavix for 5 days prior to the procedure.    After the procedure, resume the antiplatelet when safe from a surgical standpoint.  B-blocker:  continue metoprolol in the jay jay-operative period.    Last PCP visit:  Patient does not have a PCP or has not yet seen their PCP    Plan:     MEDICATIONS:  continue cardiac medications    DIET:  Avoid processed foods and drinks, sugar, salt, fried/greasy foods, and fast foods    Recommend 10 servings of fruits and vegetables per day    EXERCISE:  activities as tolerated    FOLLOW UP:  4 months    Tk Woodard MD  Cardiology Attending     Future Appointments   Date Time Provider Department Center   5/31/2023  9:45 AM Holly Kamara DO  [facility] UROLO [facility]   6/6/2023  9:15 AM PACEMAKER, [facility] CARDIOLOGY [facility] CARD [facility]     05/23/2023 3:53 PM

## 2023-05-31 ENCOUNTER — OFFICE VISIT (OUTPATIENT)
Dept: UROLOGY | Facility: CLINIC | Age: 63
End: 2023-05-31
Payer: MEDICAID

## 2023-05-31 VITALS
HEART RATE: 61 BPM | RESPIRATION RATE: 18 BRPM | WEIGHT: 159.38 LBS | BODY MASS INDEX: 26.55 KG/M2 | OXYGEN SATURATION: 100 % | SYSTOLIC BLOOD PRESSURE: 108 MMHG | DIASTOLIC BLOOD PRESSURE: 76 MMHG | HEIGHT: 65 IN | TEMPERATURE: 98 F

## 2023-05-31 DIAGNOSIS — R97.20 ELEVATED PSA: Primary | ICD-10-CM

## 2023-05-31 PROCEDURE — 3078F DIAST BP <80 MM HG: CPT | Mod: CPTII,,, | Performed by: UROLOGY

## 2023-05-31 PROCEDURE — 4010F ACE/ARB THERAPY RXD/TAKEN: CPT | Mod: CPTII,,, | Performed by: UROLOGY

## 2023-05-31 PROCEDURE — 1159F MED LIST DOCD IN RCRD: CPT | Mod: CPTII,,, | Performed by: UROLOGY

## 2023-05-31 PROCEDURE — 99213 OFFICE O/P EST LOW 20 MIN: CPT | Mod: S$PBB,,, | Performed by: UROLOGY

## 2023-05-31 PROCEDURE — 4010F PR ACE/ARB THEARPY RXD/TAKEN: ICD-10-PCS | Mod: CPTII,,, | Performed by: UROLOGY

## 2023-05-31 PROCEDURE — 3074F SYST BP LT 130 MM HG: CPT | Mod: CPTII,,, | Performed by: UROLOGY

## 2023-05-31 PROCEDURE — 99215 OFFICE O/P EST HI 40 MIN: CPT | Mod: PBBFAC | Performed by: UROLOGY

## 2023-05-31 PROCEDURE — 1160F RVW MEDS BY RX/DR IN RCRD: CPT | Mod: CPTII,,, | Performed by: UROLOGY

## 2023-05-31 PROCEDURE — 3074F PR MOST RECENT SYSTOLIC BLOOD PRESSURE < 130 MM HG: ICD-10-PCS | Mod: CPTII,,, | Performed by: UROLOGY

## 2023-05-31 PROCEDURE — 1159F PR MEDICATION LIST DOCUMENTED IN MEDICAL RECORD: ICD-10-PCS | Mod: CPTII,,, | Performed by: UROLOGY

## 2023-05-31 PROCEDURE — 1160F PR REVIEW ALL MEDS BY PRESCRIBER/CLIN PHARMACIST DOCUMENTED: ICD-10-PCS | Mod: CPTII,,, | Performed by: UROLOGY

## 2023-05-31 PROCEDURE — 3008F BODY MASS INDEX DOCD: CPT | Mod: CPTII,,, | Performed by: UROLOGY

## 2023-05-31 PROCEDURE — 3078F PR MOST RECENT DIASTOLIC BLOOD PRESSURE < 80 MM HG: ICD-10-PCS | Mod: CPTII,,, | Performed by: UROLOGY

## 2023-05-31 PROCEDURE — 99213 PR OFFICE/OUTPT VISIT, EST, LEVL III, 20-29 MIN: ICD-10-PCS | Mod: S$PBB,,, | Performed by: UROLOGY

## 2023-05-31 PROCEDURE — 3008F PR BODY MASS INDEX (BMI) DOCUMENTED: ICD-10-PCS | Mod: CPTII,,, | Performed by: UROLOGY

## 2023-05-31 NOTE — PROGRESS NOTES
Pt seen by Dr. Kamara; Pt instructed to return to clinic in 3 months w/PSA; Discharge paperwork given w/pt verbalizing understanding

## 2023-05-31 NOTE — PROGRESS NOTES
CC:  Elevated PSA    HPI:  Nicole Garcia is a 62 y.o. male seen for follow-up of elevated PSA.  The patient has a history of an elevated PSA and had a prostate biopsy in April of 2022.  That biopsy was negative.  He then had a PSA in May of 2022 and that was 5.88.  On follow-up on 26 October 2022 his PSA had increased to 7.92.  I treated him with Bactrim DS and he had a repeat PSA and it returned to baseline at 5.69.  He returns today with another PSA to follow-up.  He has no urinary complaints.    Urinalysis:  Unable to give a urine sample.    Lab Results:  Recent Labs     05/31/23  0858   PSA 7.92*       ROS:  All systems reviewed and are negative except as documented in HPI and/or Assessment and Plan.     Patient Active Problem List:     Patient Active Problem List   Diagnosis    HFrEF (heart failure with reduced ejection fraction)    Atherosclerosis of native coronary artery without angina pectoris    Paroxysmal atrial fibrillation    Acquired hypothyroidism    Former smoker    Mixed hyperlipidemia    Primary hypertension        Past Medical History:  Past Medical History:   Diagnosis Date    Atrial fibrillation     Coronary artery disease     Hyperlipidemia     Hypertension         Past Surgical History:  Past Surgical History:   Procedure Laterality Date    CARDIAC CATHETERIZATION      CORONARY ANGIOPLASTY WITH STENT PLACEMENT          Family History:  Family History   Problem Relation Age of Onset    Heart disease Mother     Stroke Mother     Heart attack Mother     Heart failure Mother     Heart disease Father     Stroke Father     Heart attack Father     Heart failure Father     Heart failure Sister     Heart disease Brother     Stroke Brother     Heart failure Brother     Arrhythmia Brother         Social History:  Social History     Socioeconomic History    Marital status:     Number of children: 1   Tobacco Use    Smoking status: Former     Types: Cigarettes    Smokeless tobacco: Never    Substance and Sexual Activity    Alcohol use: Yes     Alcohol/week: 2.0 standard drinks     Types: 2 Shots of liquor per week     Comment: once a week    Drug use: Not Currently    Sexual activity: Not Currently     Social Determinants of Health     Financial Resource Strain: Low Risk     Difficulty of Paying Living Expenses: Not very hard   Food Insecurity: No Food Insecurity    Worried About Running Out of Food in the Last Year: Never true    Ran Out of Food in the Last Year: Never true   Transportation Needs: No Transportation Needs    Lack of Transportation (Medical): No    Lack of Transportation (Non-Medical): No   Physical Activity: Inactive    Days of Exercise per Week: 0 days    Minutes of Exercise per Session: 0 min   Stress: No Stress Concern Present    Feeling of Stress : Not at all   Social Connections: Socially Isolated    Frequency of Communication with Friends and Family: Three times a week    Frequency of Social Gatherings with Friends and Family: Once a week    Attends Religion Services: Never    Active Member of Clubs or Organizations: No    Attends Club or Organization Meetings: Never    Marital Status:    Housing Stability: Low Risk     Unable to Pay for Housing in the Last Year: No    Number of Places Lived in the Last Year: 1    Unstable Housing in the Last Year: No        Allergies:  Review of patient's allergies indicates:   Allergen Reactions    Egg derived Anaphylaxis    Flu vac 2012 (18-64yrs)(pf)      Any thing with eggs   Stop breathing    Influenza virus vaccines      Other reaction(s): Allergy to eggs., Allergy to eggs.        Objective:  Vitals:    05/31/23 0937   BP: 108/76   Pulse: 61   Resp: 18   Temp: 98.2 °F (36.8 °C)     General:  Well developed, well nourished adult male in no acute distress  Abdomen: Soft, nontender, no masses  Extremities:  No clubbing, cyanosis, or edema  Neurologic:  Grossly intact  Musculoskeletal:  Normal tone  Prostate exam:  Patient refused a  prostate exam today.    Assessment:  1. Elevated PSA  - POCT urinalysis, dipstick or tablet reag  - PSA, Total (Diagnostic); Future     Plan:  PSA is back up to what it was seven months ago.  We will continue to follow this closely as this may be his new normal.    Follow-up:  PSA in three months and follow-up after.

## 2023-06-01 ENCOUNTER — TELEPHONE (OUTPATIENT)
Dept: INTERNAL MEDICINE | Facility: CLINIC | Age: 63
End: 2023-06-01
Payer: MEDICAID

## 2023-06-01 RX ORDER — LEVOTHYROXINE SODIUM 50 UG/1
75 TABLET ORAL DAILY
Qty: 30 TABLET | Refills: 11 | Status: SHIPPED | OUTPATIENT
Start: 2023-06-01 | End: 2024-03-07 | Stop reason: SDUPTHER

## 2023-06-01 NOTE — TELEPHONE ENCOUNTER
Pt was contacted about TSH level, and increasing Synthroid medication with new Rx sent to [facility] pharmacy. Pt was also informed to take medication each morning on an empty stomach. Pt verbalized understanding.

## 2023-06-01 NOTE — TELEPHONE ENCOUNTER
Reviewed TSH, still not at goal, will increase dose to 75 mcg, new rx called in to [facility] in [place]. Please call pt and let him know. Also ensure he is taking synthroid each morning on empty stomach. Thank you!

## 2023-06-06 RX ORDER — ALBUTEROL SULFATE 90 UG/1
2 AEROSOL, METERED RESPIRATORY (INHALATION) EVERY 4 HOURS PRN
Qty: 18 G | Refills: 11 | Status: SHIPPED | OUTPATIENT
Start: 2023-06-06

## 2023-06-06 NOTE — TELEPHONE ENCOUNTER
----- Message from Jeannie Matos sent at 6/6/2023 10:29 AM CDT -----  Regarding: Dr. Shirley   Refill Request    Provider: Dr. Shirley    Last Visit: Apr. 17, 2023    Next Visit: Oct. 2, 2023    Patient's Contact #: [phone]    Medication Name & Dose: albuterol (PROVENTIL/VENTOLIN HFA) 90 mcg/actuation inhaler    Preferred Pharmacy: [facility]    Comment:   Pt is out of medication.  Please Advise.    Thanks

## 2023-07-13 ENCOUNTER — CLINICAL SUPPORT (OUTPATIENT)
Dept: CARDIOLOGY | Facility: CLINIC | Age: 63
End: 2023-07-13
Payer: MEDICAID

## 2023-07-13 DIAGNOSIS — Z95.810 AICD (AUTOMATIC CARDIOVERTER/DEFIBRILLATOR) PRESENT: Primary | ICD-10-CM

## 2023-07-13 PROCEDURE — 93283 PRGRMG EVAL IMPLANTABLE DFB: CPT | Mod: PBBFAC | Performed by: INTERNAL MEDICINE

## 2023-07-13 PROCEDURE — 99211 OFF/OP EST MAY X REQ PHY/QHP: CPT | Mod: PBBFAC

## 2023-07-25 DIAGNOSIS — E78.5 HYPERLIPIDEMIA, UNSPECIFIED HYPERLIPIDEMIA TYPE: Chronic | ICD-10-CM

## 2023-07-31 RX ORDER — ATORVASTATIN CALCIUM 80 MG/1
80 TABLET, FILM COATED ORAL DAILY
Qty: 90 TABLET | Refills: 3 | Status: SHIPPED | OUTPATIENT
Start: 2023-07-31 | End: 2024-05-13 | Stop reason: SDUPTHER

## 2023-09-11 ENCOUNTER — LAB VISIT (OUTPATIENT)
Dept: LAB | Facility: HOSPITAL | Age: 63
End: 2023-09-11
Attending: UROLOGY
Payer: MEDICAID

## 2023-09-11 DIAGNOSIS — R97.20 ELEVATED PSA: ICD-10-CM

## 2023-09-11 LAB — PSA SERPL-MCNC: 7.58 NG/ML

## 2023-09-11 PROCEDURE — 84153 ASSAY OF PSA TOTAL: CPT

## 2023-09-11 PROCEDURE — 36415 COLL VENOUS BLD VENIPUNCTURE: CPT

## 2023-09-12 NOTE — PROGRESS NOTES
CHIEF COMPLAINT:   Chief Complaint   Patient presents with    Follow-up     Denies cardiac targets                                                  HPI:  Nicole Garcia 62 y.o. male with PMH of ischemic cardiomyopathy (EF 10%), Afib, HTN, HLD, CAD s/p PCI to LAD 5/2021 who is presenting to [facility] Cardiology clinic for follow up and ongoing care.  Last clinic visit May 2023.  At his last office visit patient denied any cardiac complaints and stated that he was feeling very well overall.    Today the patient denies any cardiac complaints chest pain, shortness of breath, palpitations, PND, orthopnea, claudication symptoms, lightheadedness, dizziness, or syncope.  He states that he is feeling well overall.  He states that he is able to complete his ADLs without any issues or ischemic symptoms.  He states that he is very active in his day-to-day life through exercise and taking care of his 13-year-old daughter.  He reports compliance with all medications.  He denies any tobacco use.                                                                                                                                                                                                                                                                                                                                                                                                                                                                                      CARDIAC TESTING:  PCI to LAD in May 2021     TTE 12/2022 - EF 10%    Patient Active Problem List   Diagnosis    HFrEF (heart failure with reduced ejection fraction)    Atherosclerosis of native coronary artery without angina pectoris    Paroxysmal atrial fibrillation    Acquired hypothyroidism    Former smoker    Mixed hyperlipidemia    Primary hypertension     Past Surgical History:   Procedure Laterality Date    CARDIAC CATHETERIZATION      CORONARY ANGIOPLASTY WITH STENT PLACEMENT        Social History     Socioeconomic History    Marital status:     Number of children: 1   Tobacco Use    Smoking status: Former     Types: Cigarettes    Smokeless tobacco: Never   Substance and Sexual Activity    Alcohol use: Yes     Alcohol/week: 2.0 standard drinks of alcohol     Types: 2 Shots of liquor per week     Comment: once a week    Drug use: Not Currently    Sexual activity: Not Currently     Social Determinants of Health     Financial Resource Strain: Low Risk  (7/12/2022)    Overall Financial Resource Strain (CARDIA)     Difficulty of Paying Living Expenses: Not very hard   Food Insecurity: No Food Insecurity (7/12/2022)    Hunger Vital Sign     Worried About Running Out of Food in the Last Year: Never true     Ran Out of Food in the Last Year: Never true   Transportation Needs: No Transportation Needs (7/12/2022)    PRAPARE - Transportation     Lack of Transportation (Medical): No     Lack of Transportation (Non-Medical): No   Physical Activity: Inactive (7/12/2022)    Exercise Vital Sign     Days of Exercise per Week: 0 days     Minutes of Exercise per Session: 0 min   Stress: No Stress Concern Present (7/12/2022)    [employer] [place] [employer] - Occupational Stress Questionnaire     Feeling of Stress : Not at all   Social Connections: Socially Isolated (7/12/2022)    Social Connection and Isolation Panel [NHANES]     Frequency of Communication with Friends and Family: Three times a week     Frequency of Social Gatherings with Friends and Family: Once a week     Attends L[employer]-day Services: Never     Active Member of Clubs or Organizations: No     Attends Club or Organization Meetings: Never     Marital Status:    Housing Stability: Low Risk  (7/12/2022)    Housing Stability Vital Sign     Unable to Pay for Housing in the Last Year: No     Number of Places Lived in the Last Year: 1     Unstable Housing in the Last Year: No        Family History   Problem Relation Age of  Onset    Heart disease Mother     Stroke Mother     Heart attack Mother     Heart failure Mother     Heart disease Father     Stroke Father     Heart attack Father     Heart failure Father     Heart failure Sister     Heart disease Brother     Stroke Brother     Heart failure Brother     Arrhythmia Brother      Review of patient's allergies indicates:   Allergen Reactions    Egg derived Anaphylaxis    Flu vac 2012 (18-64yrs)(pf)      Any thing with eggs   Stop breathing    Influenza virus vaccines      Other reaction(s): Allergy to eggs., Allergy to eggs.         ROS:  Review of Systems   Constitutional: Negative.    HENT: Negative.     Eyes: Negative.    Respiratory: Negative.  Negative for shortness of breath.    Cardiovascular: Negative.  Negative for chest pain, palpitations, orthopnea, claudication, leg swelling and PND.   Gastrointestinal: Negative.    Genitourinary: Negative.    Musculoskeletal: Negative.    Skin: Negative.    Neurological: Negative.    Endo/Heme/Allergies: Negative.    Psychiatric/Behavioral: Negative.                                                                                                                                                                                  Negative except as stated in the history of present illness. See HPI for details.    PHYSICAL EXAM:  Visit Vitals  BP 99/65 (BP Location: Right arm, Patient Position: Sitting, BP Method: Medium (Automatic))   Pulse 76   Temp 98.6 °F (37 °C)   Resp 18   Ht 5' (1.524 m)   Wt 72.6 kg (160 lb 0.9 oz)   SpO2 99%   BMI 31.26 kg/m²       Physical Exam  Constitutional:       Appearance: Normal appearance.   HENT:      Head: Normocephalic.      Mouth/Throat:      Mouth: Mucous membranes are moist.   Eyes:      Extraocular Movements: Extraocular movements intact.   Cardiovascular:      Rate and Rhythm: Normal rate and regular rhythm.      Pulses: Normal pulses.      Heart sounds: Normal heart sounds. No murmur heard.  Pulmonary:       Effort: Pulmonary effort is normal. No respiratory distress.      Breath sounds: Normal breath sounds.   Abdominal:      General: There is no distension.   Musculoskeletal:         General: Normal range of motion.      Right lower leg: No edema.      Left lower leg: No edema.   Skin:     General: Skin is warm and dry.   Neurological:      General: No focal deficit present.      Mental Status: He is alert and oriented to person, place, and time.   Psychiatric:         Mood and Affect: Mood normal.         Behavior: Behavior normal.         Current Outpatient Medications   Medication Instructions    albuterol (PROVENTIL/VENTOLIN HFA) 90 mcg/actuation inhaler 2 puffs, Inhalation, Every 4 hours PRN    amiodarone (PACERONE) 200 mg, Oral, Daily    atorvastatin (LIPITOR) 80 mg, Oral, Daily    bumetanide (BUMEX) 2 mg, Oral, 2 times daily    clopidogreL (PLAVIX) 75 mg, Oral, Daily    ergocalciferol (ERGOCALCIFEROL) 50,000 unit Cap 1 capsule, Oral, Every 7 days    levothyroxine (SYNTHROID) 75 mcg, Oral, Daily    metoprolol succinate (TOPROL-XL) 50 mg, Oral, 2 times daily    PLENVU 140-9-5.2 gram PPkS Oral    potassium chloride (KLOR-CON) 10 MEQ TbSR 10 mEq, Oral, 2 times daily    rivaroxaban (XARELTO) 20 mg, Oral, With dinner    sacubitriL-valsartan (ENTRESTO) 24-26 mg per tablet 1 tablet, Oral, 2 times daily    spironolactone (ALDACTONE) 25 mg, Oral, Daily    vitamin D (VITAMIN D3) 1,000 Units, Oral, Daily        All medications, laboratory studies, cardiac diagnostic imaging reviewed.     Lab Results   Component Value Date    LDL 57.00 10/03/2022    .00 02/15/2022    TRIG 219 (H) 10/03/2022    TRIG 198 02/15/2022    CREATININE 1.58 (H) 04/17/2023    MG 2.00 03/09/2023    K 4.0 04/17/2023        ASSESSMENT/PLAN:  ICMO s/p PCI to LAD May 2021 in [facility]  HFrEF ( EF 10%, 12/2021 TTE )  NYHA class II  - Denies any chest pain, shortness of breath, or palpitations (see HPI)  - Continue GDMT:  metoprolol succinate 50mg  BID, Entresto 24/26 BID, Aldactone 25mg daily  - Continue Plavix 75mg, Atorvastatin 80mg, Bumex 2mg BID,   - ICD Device last interrogated 7.13.23- no events were recorded, no changes made  - Symptoms well-controlled at this time  - Continue risk-factor modication, low-salt diet, and continued tobacco abstinence     Afib-rate controlled  - Denies any symptoms today (see HPI)  - Continue metoprolol 50mg BID, amiodarone 200mg daily, Xarelto 20mg daily  - Will need EKG at next office visit      Hx V-Tach  - No events recently   - Continue amiodarone 200mg daily  - LFT and TSH q6 months while on amiodarone     HTN, HLD  - BP at goal today   - LDL 57 per labs 10.3.22  - Continue Atorvastatin   - FLP prior to next office visit  - Counseled on low-sodium, low-cholesterol, heart healthy diet and exercise as tolerated     Hx Hypothyroidism  -Continue uptitrating synthroid until TSH within goal, defer to PCP       Complete CMP/FLP prior to next office visit   Return to clinic for device check on October 3rd, 2023  Follow up in cardiology clinic in 4 months or sooner if needed  Follow up with PCP as directed

## 2023-09-13 ENCOUNTER — OFFICE VISIT (OUTPATIENT)
Dept: CARDIOLOGY | Facility: CLINIC | Age: 63
End: 2023-09-13
Payer: MEDICAID

## 2023-09-13 VITALS
SYSTOLIC BLOOD PRESSURE: 99 MMHG | RESPIRATION RATE: 18 BRPM | OXYGEN SATURATION: 99 % | TEMPERATURE: 99 F | WEIGHT: 160.06 LBS | DIASTOLIC BLOOD PRESSURE: 65 MMHG | BODY MASS INDEX: 31.42 KG/M2 | HEART RATE: 76 BPM | HEIGHT: 60 IN

## 2023-09-13 DIAGNOSIS — E78.2 MIXED HYPERLIPIDEMIA: ICD-10-CM

## 2023-09-13 DIAGNOSIS — I10 PRIMARY HYPERTENSION: ICD-10-CM

## 2023-09-13 DIAGNOSIS — E78.5 HYPERLIPIDEMIA, UNSPECIFIED HYPERLIPIDEMIA TYPE: Primary | ICD-10-CM

## 2023-09-13 DIAGNOSIS — I48.0 PAROXYSMAL ATRIAL FIBRILLATION: ICD-10-CM

## 2023-09-13 DIAGNOSIS — I50.20 HFREF (HEART FAILURE WITH REDUCED EJECTION FRACTION): ICD-10-CM

## 2023-09-13 DIAGNOSIS — I25.10 ATHEROSCLEROSIS OF NATIVE CORONARY ARTERY OF NATIVE HEART WITHOUT ANGINA PECTORIS: ICD-10-CM

## 2023-09-13 PROCEDURE — 99215 OFFICE O/P EST HI 40 MIN: CPT | Mod: PBBFAC

## 2023-09-13 RX ORDER — POLYETHYLENE GLYCOL 3350, SODIUM SULFATE, SODIUM CHLORIDE, POTASSIUM CHLORIDE, ASCORBIC ACID, SODIUM ASCORBATE 140-9-5.2G
KIT ORAL
COMMUNITY
Start: 2023-06-20

## 2023-09-13 NOTE — PATIENT INSTRUCTIONS
Complete CMP/FLP prior to next office visit   Return to clinic for device check on October 3rd, 2023  Follow up in cardiology clinic in 4 months or sooner if needed  Follow up with PCP as directed

## 2023-10-17 ENCOUNTER — CLINICAL SUPPORT (OUTPATIENT)
Dept: CARDIOLOGY | Facility: CLINIC | Age: 63
End: 2023-10-17
Payer: MEDICAID

## 2023-10-17 DIAGNOSIS — Z95.810 AICD (AUTOMATIC CARDIOVERTER/DEFIBRILLATOR) PRESENT: Primary | ICD-10-CM

## 2023-10-17 PROCEDURE — 99211 OFF/OP EST MAY X REQ PHY/QHP: CPT | Mod: PBBFAC

## 2023-10-17 PROCEDURE — 93283 PRGRMG EVAL IMPLANTABLE DFB: CPT | Mod: PBBFAC | Performed by: INTERNAL MEDICINE

## 2024-01-23 ENCOUNTER — CLINICAL SUPPORT (OUTPATIENT)
Dept: CARDIOLOGY | Facility: CLINIC | Age: 64
End: 2024-01-23
Payer: MEDICAID

## 2024-01-23 DIAGNOSIS — Z95.810 AICD (AUTOMATIC CARDIOVERTER/DEFIBRILLATOR) PRESENT: Primary | ICD-10-CM

## 2024-01-23 PROCEDURE — 99211 OFF/OP EST MAY X REQ PHY/QHP: CPT | Mod: PBBFAC

## 2024-01-23 PROCEDURE — 93283 PRGRMG EVAL IMPLANTABLE DFB: CPT | Mod: PBBFAC | Performed by: INTERNAL MEDICINE

## 2024-01-31 DIAGNOSIS — I48.0 PAROXYSMAL ATRIAL FIBRILLATION: ICD-10-CM

## 2024-01-31 DIAGNOSIS — I25.10 CORONARY ARTERY DISEASE, UNSPECIFIED VESSEL OR LESION TYPE, UNSPECIFIED WHETHER ANGINA PRESENT, UNSPECIFIED WHETHER NATIVE OR TRANSPLANTED HEART: ICD-10-CM

## 2024-02-01 RX ORDER — CLOPIDOGREL BISULFATE 75 MG/1
75 TABLET ORAL DAILY
Qty: 90 TABLET | Refills: 3 | Status: SHIPPED | OUTPATIENT
Start: 2024-02-01

## 2024-02-02 DIAGNOSIS — I48.0 PAROXYSMAL ATRIAL FIBRILLATION: ICD-10-CM

## 2024-02-02 RX ORDER — AMIODARONE HYDROCHLORIDE 200 MG/1
200 TABLET ORAL DAILY
Qty: 90 TABLET | Refills: 3 | Status: SHIPPED | OUTPATIENT
Start: 2024-02-02

## 2024-02-14 DIAGNOSIS — I50.20 HFREF (HEART FAILURE WITH REDUCED EJECTION FRACTION): ICD-10-CM

## 2024-02-14 RX ORDER — SPIRONOLACTONE 25 MG/1
25 TABLET ORAL DAILY
Qty: 90 TABLET | Refills: 3 | Status: SHIPPED | OUTPATIENT
Start: 2024-02-14

## 2024-02-14 RX ORDER — SACUBITRIL AND VALSARTAN 24; 26 MG/1; MG/1
1 TABLET, FILM COATED ORAL 2 TIMES DAILY
Qty: 180 TABLET | Refills: 3 | Status: SHIPPED | OUTPATIENT
Start: 2024-02-14

## 2024-02-19 ENCOUNTER — HOSPITAL ENCOUNTER (EMERGENCY)
Facility: HOSPITAL | Age: 64
Discharge: HOME | End: 2024-02-19
Attending: INTERNAL MEDICINE
Payer: MEDICAID

## 2024-02-19 VITALS
RESPIRATION RATE: 16 BRPM | BODY MASS INDEX: 26.66 KG/M2 | OXYGEN SATURATION: 98 % | TEMPERATURE: 98 F | HEIGHT: 65 IN | HEART RATE: 71 BPM | DIASTOLIC BLOOD PRESSURE: 79 MMHG | SYSTOLIC BLOOD PRESSURE: 108 MMHG | WEIGHT: 160 LBS

## 2024-02-19 DIAGNOSIS — M54.50 BILATERAL LOW BACK PAIN WITHOUT SCIATICA, UNSPECIFIED CHRONICITY: Primary | ICD-10-CM

## 2024-02-19 DIAGNOSIS — I50.20 HFREF (HEART FAILURE WITH REDUCED EJECTION FRACTION): ICD-10-CM

## 2024-02-19 PROCEDURE — 99284 EMERGENCY DEPT VISIT MOD MDM: CPT

## 2024-02-19 RX ORDER — TRAMADOL HYDROCHLORIDE 50 MG/1
50 TABLET ORAL EVERY 6 HOURS PRN
Qty: 12 TABLET | Refills: 0 | Status: SHIPPED | OUTPATIENT
Start: 2024-02-19 | End: 2024-05-13

## 2024-02-19 RX ORDER — METHOCARBAMOL 500 MG/1
1000 TABLET, FILM COATED ORAL 3 TIMES DAILY PRN
Qty: 30 TABLET | Refills: 0 | Status: SHIPPED | OUTPATIENT
Start: 2024-02-19 | End: 2024-05-13

## 2024-02-19 NOTE — Clinical Note
"Nicole Durand (Shannon)aux was seen and treated in our emergency department on 2/19/2024.  He may return to work on 02/20/2024.       If you have any questions or concerns, please don't hesitate to call.       LPN    "

## 2024-02-19 NOTE — ED PROVIDER NOTES
Encounter Date: 2/19/2024       History     Chief Complaint   Patient presents with    Back Pain     Patient reports straining his lower back yesterday while sitting down watching TV. 9/10 pain     Patient with pmhx of HTN, HLD, CAD, atrial fibrillation, and heart failure presents today c/o low back pain that started yesterday. Pain is constant and made worse with movement. Pain does not radiate. No relieving factors. He denies any other associated symptoms. Patient says he was sitting down watching TV a lot yesterday and isn't sure if he did something to his back when he would get up from the chair and move around. Currently in wheel chair because it hurts more to walk, but drove himself and his daughter to the ED today. Denies injury/trauma, focal weakness, paresthesia, bladder/bowel incontinence, saddle numbness, dysuria, hematuria, abdominal pain, leg pain, cp, sob.     The history is provided by the patient. No  was used.     Review of patient's allergies indicates:   Allergen Reactions    Egg derived Anaphylaxis    Flu vac 2012 (18-64yrs)(pf)      Any thing with eggs   Stop breathing    Influenza virus vaccines      Other reaction(s): Allergy to eggs., Allergy to eggs.     Past Medical History:   Diagnosis Date    Atrial fibrillation     Coronary artery disease     Hyperlipidemia     Hypertension      Past Surgical History:   Procedure Laterality Date    CARDIAC CATHETERIZATION      CORONARY ANGIOPLASTY WITH STENT PLACEMENT       Family History   Problem Relation Age of Onset    Heart disease Mother     Stroke Mother     Heart attack Mother     Heart failure Mother     Heart disease Father     Stroke Father     Heart attack Father     Heart failure Father     Heart failure Sister     Heart disease Brother     Stroke Brother     Heart failure Brother     Arrhythmia Brother      Social History     Tobacco Use    Smoking status: Former     Types: Cigarettes    Smokeless tobacco: Never    Substance Use Topics    Alcohol use: Yes     Alcohol/week: 2.0 standard drinks of alcohol     Types: 2 Shots of liquor per week     Comment: once a week    Drug use: Not Currently     Review of Systems   Constitutional:  Negative for chills and fever.   Respiratory:  Negative for cough, chest tightness, shortness of breath and wheezing.    Cardiovascular:  Negative for chest pain and leg swelling.   Gastrointestinal:  Negative for abdominal pain, constipation, diarrhea, nausea and vomiting.   Genitourinary:  Negative for dysuria, flank pain and hematuria.   Musculoskeletal:  Positive for back pain. Negative for gait problem and neck pain.   Skin:  Negative for rash.   Neurological:  Negative for syncope, weakness, light-headedness, numbness and headaches.   All other systems reviewed and are negative.      Physical Exam     Initial Vitals [02/19/24 1243]   BP Pulse Resp Temp SpO2   111/72 67 14 98.1 °F (36.7 °C) 100 %      MAP       --         Physical Exam    Nursing note and vitals reviewed.  Constitutional: He appears well-developed. He is not diaphoretic. No distress.   HENT:   Head: Normocephalic and atraumatic.   Mouth/Throat: Oropharynx is clear and moist. No oropharyngeal exudate.   Eyes: Conjunctivae and EOM are normal.   Neck: Neck supple.   Normal range of motion.  Cardiovascular:  Normal rate, regular rhythm, normal heart sounds and intact distal pulses.           Pulmonary/Chest: Breath sounds normal. No respiratory distress. He has no wheezes. He has no rhonchi. He has no rales.   Abdominal: Abdomen is soft. He exhibits no distension. There is no abdominal tenderness. There is no rebound and no guarding.   Musculoskeletal:      Cervical back: Normal range of motion and neck supple.      Comments: Sitting comfortably in wheel chair. No lumbar VPT. No step offs. Tenderness overlying bilateral lumbar paraspinal muscles bilaterally.      Neurological: He is alert and oriented to person, place, and time.  He has normal strength. No sensory deficit. GCS score is 15. GCS eye subscore is 4. GCS verbal subscore is 5. GCS motor subscore is 6.   Skin: Skin is warm and dry. Capillary refill takes less than 2 seconds. No rash noted.   Psychiatric: He has a normal mood and affect.         ED Course   Procedures  Labs Reviewed - No data to display       Imaging Results    None          Medications - No data to display  Medical Decision Making  Patient presents today c/o low back pain that started yesterday. No red flag symptoms. No neuro deficits.     Ddx: lumbar strain, lumbar radiculopathy, muscle spasms, cystitis    Patient is non-toxic appearing. Vitals stable. No neuro deficits on exam. Short course of tramadol and robaxin sent to pharmacy. Advised to f/u with pcp. Stable for discharge. ED precautions given.     Amount and/or Complexity of Data Reviewed  External Data Reviewed: notes.                                      Clinical Impression:  Final diagnoses:  [M54.50] Bilateral low back pain without sciatica, unspecified chronicity (Primary)          ED Disposition Condition    Discharge Stable          ED Prescriptions       Medication Sig Dispense Start Date End Date Auth. Provider    traMADoL (ULTRAM) 50 mg tablet Take 1 tablet (50 mg total) by mouth every 6 (six) hours as needed for Pain. 12 tablet 2/19/2024 -- Theresa Clark PA    methocarbamoL (ROBAXIN) 500 MG Tab Take 2 tablets (1,000 mg total) by mouth 3 (three) times daily as needed (muscle spasms). 30 tablet 2/19/2024 -- Theresa Clark PA          Follow-up Information       Follow up With Specialties Details Why Contact Info    [facility] - Emergency Dept Emergency Medicine  If symptoms worsen return to ED immediately [address] [facility] [address] [phone]    Aric Shirley MD Internal Medicine In 2 days  [address] [facility] [id number] [phone]               Theresa Clark  PA  02/19/24 1326

## 2024-02-20 DIAGNOSIS — I50.20 HFREF (HEART FAILURE WITH REDUCED EJECTION FRACTION): ICD-10-CM

## 2024-02-20 RX ORDER — BUMETANIDE 2 MG/1
2 TABLET ORAL 2 TIMES DAILY
Qty: 180 TABLET | Refills: 3 | Status: SHIPPED | OUTPATIENT
Start: 2024-02-20

## 2024-02-20 RX ORDER — BUMETANIDE 2 MG/1
2 TABLET ORAL 2 TIMES DAILY
Qty: 180 TABLET | Refills: 3 | OUTPATIENT
Start: 2024-02-20

## 2024-03-11 RX ORDER — LEVOTHYROXINE SODIUM 50 UG/1
75 TABLET ORAL DAILY
Qty: 30 TABLET | Refills: 11 | Status: SHIPPED | OUTPATIENT
Start: 2024-03-11 | End: 2024-03-18 | Stop reason: SDUPTHER

## 2024-03-14 ENCOUNTER — OFFICE VISIT (OUTPATIENT)
Dept: CARDIOLOGY | Facility: CLINIC | Age: 64
End: 2024-03-14
Payer: MEDICAID

## 2024-03-14 ENCOUNTER — LAB VISIT (OUTPATIENT)
Dept: LAB | Facility: HOSPITAL | Age: 64
End: 2024-03-14
Payer: MEDICAID

## 2024-03-14 VITALS
RESPIRATION RATE: 18 BRPM | DIASTOLIC BLOOD PRESSURE: 74 MMHG | OXYGEN SATURATION: 100 % | SYSTOLIC BLOOD PRESSURE: 81 MMHG | HEART RATE: 69 BPM | TEMPERATURE: 98 F | BODY MASS INDEX: 26.33 KG/M2 | WEIGHT: 158 LBS | HEIGHT: 65 IN

## 2024-03-14 DIAGNOSIS — I48.0 PAROXYSMAL ATRIAL FIBRILLATION: ICD-10-CM

## 2024-03-14 DIAGNOSIS — Z95.810 AICD (AUTOMATIC CARDIOVERTER/DEFIBRILLATOR) PRESENT: ICD-10-CM

## 2024-03-14 DIAGNOSIS — E78.5 HYPERLIPIDEMIA, UNSPECIFIED HYPERLIPIDEMIA TYPE: ICD-10-CM

## 2024-03-14 DIAGNOSIS — I25.10 ATHEROSCLEROSIS OF NATIVE CORONARY ARTERY OF NATIVE HEART WITHOUT ANGINA PECTORIS: ICD-10-CM

## 2024-03-14 DIAGNOSIS — I50.20 HFREF (HEART FAILURE WITH REDUCED EJECTION FRACTION): Primary | ICD-10-CM

## 2024-03-14 DIAGNOSIS — E78.5 HYPERLIPIDEMIA, UNSPECIFIED HYPERLIPIDEMIA TYPE: Chronic | ICD-10-CM

## 2024-03-14 LAB
ALBUMIN SERPL-MCNC: 4 G/DL (ref 3.4–4.8)
ALBUMIN/GLOB SERPL: 1.2 RATIO (ref 1.1–2)
ALP SERPL-CCNC: 130 UNIT/L (ref 40–150)
ALT SERPL-CCNC: 22 UNIT/L (ref 0–55)
AST SERPL-CCNC: 25 UNIT/L (ref 5–34)
BILIRUB SERPL-MCNC: 0.6 MG/DL
BUN SERPL-MCNC: 15.6 MG/DL (ref 8.4–25.7)
CALCIUM SERPL-MCNC: 9.5 MG/DL (ref 8.8–10)
CHLORIDE SERPL-SCNC: 98 MMOL/L (ref 98–107)
CHOLEST SERPL-MCNC: 135 MG/DL
CHOLEST/HDLC SERPL: 3 {RATIO} (ref 0–5)
CO2 SERPL-SCNC: 28 MMOL/L (ref 23–31)
CREAT SERPL-MCNC: 1.52 MG/DL (ref 0.73–1.18)
GFR SERPLBLD CREATININE-BSD FMLA CKD-EPI: 51 MLS/MIN/1.73/M2
GLOBULIN SER-MCNC: 3.4 GM/DL (ref 2.4–3.5)
GLUCOSE SERPL-MCNC: 87 MG/DL (ref 82–115)
HDLC SERPL-MCNC: 42 MG/DL (ref 35–60)
LDLC SERPL CALC-MCNC: 64 MG/DL (ref 50–140)
POTASSIUM SERPL-SCNC: 3.7 MMOL/L (ref 3.5–5.1)
PROT SERPL-MCNC: 7.4 GM/DL (ref 5.8–7.6)
SODIUM SERPL-SCNC: 138 MMOL/L (ref 136–145)
TRIGL SERPL-MCNC: 147 MG/DL (ref 34–140)
VLDLC SERPL CALC-MCNC: 29 MG/DL

## 2024-03-14 PROCEDURE — 99215 OFFICE O/P EST HI 40 MIN: CPT | Mod: PBBFAC | Performed by: INTERNAL MEDICINE

## 2024-03-14 PROCEDURE — 36415 COLL VENOUS BLD VENIPUNCTURE: CPT

## 2024-03-14 PROCEDURE — 80061 LIPID PANEL: CPT

## 2024-03-14 PROCEDURE — 80053 COMPREHEN METABOLIC PANEL: CPT

## 2024-03-14 NOTE — PROGRESS NOTES
03/14/2024 1:49 PM    Subjective:     CHIEF COMPLAINT:   Chief Complaint   Patient presents with    4mt f/u with labs.  Denies CV complaints                           HPI:    Mr. Nicole Garcia is a 63 y.o. male with HFrEF (LVEF 10%), CAD s/p PCI to LAD in 2021, PAF, HLD, and previous tobacco use who presents to clinic for follow up.     The patient has paroxysmal atrial fibrillation.  He denies bleeding complications from use of Xarelto.  He denies lightheadedness, dizziness, presyncope, or syncope.    The patient has heart failure with reduced ejection fraction.  He denies dyspnea on exertion, orthopnea, PND, early satiety, abdominal distention, or lower extremity edema.  He has not on an SGLT2 inhibitor.    The patient has a history of CAD status post PCI to his LAD in 2021.  He denies angina or anginal equivalents.  He is on Lipitor 80 mg daily.  His LDL is 64.    The patient has a history of ventricular tachycardia.  He reports he is placed on amiodarone for this.  He reports he has never been taken off of amiodarone.  I discussed with him the risks and benefits of the medication.  I discussed him the potential toxicities of the medication.  We discussed potentially discontinuing his medications; however, he does not want to discontinue the medication.    The patient measures his blood pressure at home and states that he runs in the 80s over 60s.  He reports it has been like this for years.  He is asymptomatic.    Past Medical History    Patient Active Problem List   Diagnosis    HFrEF (heart failure with reduced ejection fraction)    Atherosclerosis of native coronary artery without angina pectoris    Paroxysmal atrial fibrillation    Acquired hypothyroidism    Former smoker    Mixed hyperlipidemia    Primary hypertension       Surgical History    Past Surgical History:   Procedure Laterality Date    CARDIAC CATHETERIZATION      CORONARY ANGIOPLASTY WITH STENT PLACEMENT         Social History      Socioeconomic History    Marital status:     Number of children: 1   Tobacco Use    Smoking status: Former     Types: Cigarettes    Smokeless tobacco: Never   Substance and Sexual Activity    Alcohol use: Yes     Alcohol/week: 2.0 standard drinks of alcohol     Types: 2 Shots of liquor per week     Comment: once a week    Drug use: Not Currently    Sexual activity: Not Currently     Social Determinants of Health     Financial Resource Strain: Low Risk  (7/12/2022)    Overall Financial Resource Strain (CARDIA)     Difficulty of Paying Living Expenses: Not very hard   Food Insecurity: No Food Insecurity (7/12/2022)    Hunger Vital Sign     Worried About Running Out of Food in the Last Year: Never true     Ran Out of Food in the Last Year: Never true   Transportation Needs: No Transportation Needs (7/12/2022)    PRAPARE - Transportation     Lack of Transportation (Medical): No     Lack of Transportation (Non-Medical): No   Physical Activity: Inactive (7/12/2022)    Exercise Vital Sign     Days of Exercise per Week: 0 days     Minutes of Exercise per Session: 0 min   Stress: No Stress Concern Present (7/12/2022)    [employer] - Occupational Stress Questionnaire     Feeling of Stress : Not at all   Social Connections: Socially Isolated (7/12/2022)    Social Connection and Isolation Panel [NHANES]     Frequency of Communication with Friends and Family: Three times a week     Frequency of Social Gatherings with Friends and Family: Once a week     Attends Christianity Services: Never     Active Member of Clubs or Organizations: No     Attends Club or Organization Meetings: Never     Marital Status:    Housing Stability: Low Risk  (7/12/2022)    Housing Stability Vital Sign     Unable to Pay for Housing in the Last Year: No     Number of Places Lived in the Last Year: 1     Unstable Housing in the Last Year: No       Family History   Problem Relation Age of Onset    Heart  disease Mother     Stroke Mother     Heart attack Mother     Heart failure Mother     Heart disease Father     Stroke Father     Heart attack Father     Heart failure Father     Heart failure Sister     Heart disease Brother     Stroke Brother     Heart failure Brother     Arrhythmia Brother      Review of patient's allergies indicates:   Allergen Reactions    Egg derived Anaphylaxis    Flu vac 2012 (18-64yrs)(pf)      Any thing with eggs   Stop breathing    Influenza virus vaccines      Other reaction(s): Allergy to eggs., Allergy to eggs.       Current Medications    Current Outpatient Medications   Medication Instructions    albuterol (PROVENTIL/VENTOLIN HFA) 90 mcg/actuation inhaler 2 puffs, Inhalation, Every 4 hours PRN    amiodarone (PACERONE) 200 mg, Oral, Daily    atorvastatin (LIPITOR) 80 mg, Oral, Daily    bumetanide (BUMEX) 2 mg, Oral, 2 times daily    clopidogreL (PLAVIX) 75 mg, Oral, Daily    ergocalciferol (ERGOCALCIFEROL) 50,000 unit Cap 1 capsule, Oral, Every 7 days    levothyroxine (SYNTHROID) 75 mcg, Oral, Daily    methocarbamoL (ROBAXIN) 1,000 mg, Oral, 3 times daily PRN    metoprolol succinate (TOPROL-XL) 50 mg, Oral, 2 times daily    PLENVU 140-9-5.2 gram PPkS Oral    potassium chloride (KLOR-CON) 10 MEQ TbSR 10 mEq, Oral, 2 times daily    rivaroxaban (XARELTO) 20 mg, Oral, With dinner    sacubitriL-valsartan (ENTRESTO) 24-26 mg per tablet 1 tablet, Oral, 2 times daily    spironolactone (ALDACTONE) 25 mg, Oral, Daily    traMADoL (ULTRAM) 50 mg, Oral, Every 6 hours PRN    vitamin D (VITAMIN D3) 1,000 Units, Oral, Daily         ROS:   refer to HPI     Objective:     BP Readings from Last 3 Encounters:   03/14/24 (!) 81/74   02/19/24 108/79   09/13/23 99/65        Pulse Readings from Last 3 Encounters:   03/14/24 69   02/19/24 71   09/13/23 76        Temp Readings from Last 3 Encounters:   03/14/24 98.1 °F (36.7 °C)   02/19/24 98.4 °F (36.9 °C) (Tympanic)   09/13/23 98.6 °F (37 °C)       Wt  "Readings from Last 3 Encounters:   03/14/24 71.7 kg (158 lb)   02/19/24 72.6 kg (160 lb)   09/13/23 72.6 kg (160 lb 0.9 oz)         PE:  Blood pressure (!) 81/74, pulse 69, temperature 98.1 °F (36.7 °C), resp. rate 18, height 5' 4.96" (1.65 m), weight 71.7 kg (158 lb), SpO2 100 %.   Physical Exam  Vitals reviewed.   Constitutional:       General: He is not in acute distress.     Appearance: Normal appearance. He is obese. He is not ill-appearing.   HENT:      Head: Normocephalic and atraumatic.      Right Ear: External ear normal.      Left Ear: External ear normal.      Nose: Nose normal.      Mouth/Throat:      Mouth: Mucous membranes are moist.   Eyes:      Conjunctiva/sclera: Conjunctivae normal.   Cardiovascular:      Rate and Rhythm: Normal rate and regular rhythm.      Pulses: Normal pulses.      Heart sounds: Normal heart sounds. No murmur heard.     Comments: ICD  Pulmonary:      Effort: Pulmonary effort is normal. No respiratory distress.      Breath sounds: Normal breath sounds. No stridor. No wheezing, rhonchi or rales.   Chest:      Chest wall: No tenderness.   Abdominal:      General: Bowel sounds are normal. There is no distension.      Palpations: Abdomen is soft.   Musculoskeletal:      Cervical back: Neck supple.      Right lower leg: No edema.      Left lower leg: No edema.   Skin:     General: Skin is warm and dry.      Capillary Refill: Capillary refill takes less than 2 seconds.   Neurological:      Mental Status: He is alert and oriented to person, place, and time.   Psychiatric:         Mood and Affect: Mood normal.         Behavior: Behavior normal.                                                                                                                                                                                                                                                                                                                                                              " "                                                                                                                  CARDIAC TESTING:  Echocardiogram  No results found for this or any previous visit.      Stress Test  No results found for this or any previous visit.     Coronary Angiogram  Results for orders placed in visit on 08/29/18    CATH LAB PROCEDURE     Holter Monitor  No cardiac monitor results found for the past 12 months    Last BMP BMP  Lab Results   Component Value Date     03/14/2024    K 3.7 03/14/2024    CO2 28 03/14/2024    BUN 15.6 03/14/2024    CREATININE 1.52 (H) 03/14/2024    CALCIUM 9.5 03/14/2024    EGFRNORACEVR 51 03/14/2024      Creatinine    Lab Results   Component Value Date    CREATININE 1.52 (H) 03/14/2024    CREATININE 1.58 (H) 04/17/2023    CREATININE 1.43 (H) 03/09/2023     Magnesium No components found for: "MAG"  Last CBC     Lab Results   Component Value Date    WBC 9.3 04/17/2023    HGB 14.7 04/17/2023    HCT 45.3 04/17/2023    MCV 86.9 04/17/2023     (H) 04/17/2023           BNP    Lab Results   Component Value Date    BNP 1,057.6 (H) 12/16/2021    BNP 4,222.4 (H) 05/01/2021    BNP 6,473.5 (H) 04/28/2021     Last lipids    Lab Results   Component Value Date    CHOL 135 03/14/2024    CHOL 136 10/03/2022    CHOL 223 02/15/2022    HDL 42 03/14/2024    HDL 35 10/03/2022    HDL 39 02/15/2022    LDL 64.00 03/14/2024    LDL 57.00 10/03/2022    .00 02/15/2022    TRIG 147 (H) 03/14/2024    TRIG 219 (H) 10/03/2022    TRIG 198 02/15/2022    TOTALCHOLEST 3 03/14/2024    TOTALCHOLEST 4 10/03/2022    TOTALCHOLEST 6 02/15/2022      LFT     Lab Results   Component Value Date    ALT 22 03/14/2024    ALT 19 04/17/2023    ALT 14 10/20/2022    AST 25 03/14/2024    AST 22 04/17/2023    AST 23 10/20/2022     Troponin    Lab Results   Component Value Date    TROPONINI 0.019 12/17/2021    TROPONINI 0.020 12/16/2021    TROPONINI 0.024 11/09/2021       Assessment:     Mr. Nicole Garcia" is a 63 y.o. male with HFrEF (LVEF 10%), CAD s/p PCI to LAD in 2021, PAF, HLD, and previous tobacco use who presents to clinic for follow up.     Plan:     HFrEF  -Stage C. Class I-II symptoms.   -Entresto 24-26 mg b.i.d.  -Aldactone 25 mg daily.  -Toprol 50 mg daily.  -Chronically hypotensive.  Asymptomatic.  We will trial Jardiance 10 mg daily (has multiple indications HFrEF + CAD + CKD III).  If he becomes symptomatic we will stop.  -Continue Bumex 2 mg BID.    CAD s/p PCI to LAD 2021  Continue aspirin 81 mg daily.    Continue Toprol-XL 50 mg daily.  Continue Lipitor 80 mg daily.    PAF  -Continues Xarelto 20 mg daily for elevated CV2 score.  -Continue Toprol XL 50 mg daily for rate control.   -Continue Amiodarone 200 mg daily. Discussed risk and benefits. He prefers to continue. Has hypothyroidism. Managed by PCP. Will obtain CXR. Patient needs vision testing.     HLD  -Continue Lipitor 80 mg daily.  His LDL is at goal.    Tristian Steen MD  Cardiology Fellow    Future Appointments   Date Time Provider Department Center   4/2/2024  9:30 AM PACEMAKER, [facility] CARDIOLOGY [facility] HERO [place]   5/13/2024  9:10 AM Aric Shirley MD [facility] IM RES [facility] [place]

## 2024-03-14 NOTE — PATIENT INSTRUCTIONS
You do not need an appointment for chest x-ray.  Just go to x-ray lab at your convenience.      Dr. Steen will call to speak with Dr. Mellissa Zapata about your synthroid refill.

## 2024-03-15 ENCOUNTER — TELEPHONE (OUTPATIENT)
Dept: INTERNAL MEDICINE | Facility: CLINIC | Age: 64
End: 2024-03-15
Payer: MEDICAID

## 2024-03-15 DIAGNOSIS — E03.9 ACQUIRED HYPOTHYROIDISM: Primary | ICD-10-CM

## 2024-03-15 NOTE — TELEPHONE ENCOUNTER
Pt requesting an urgent call back concerning her Thyroid medication. Pt can be reached at [phone]

## 2024-03-18 RX ORDER — LEVOTHYROXINE SODIUM 75 UG/1
75 TABLET ORAL DAILY
Qty: 30 TABLET | Refills: 11 | Status: SHIPPED | OUTPATIENT
Start: 2024-03-18 | End: 2024-05-13 | Stop reason: SDUPTHER

## 2024-03-18 RX ORDER — LEVOTHYROXINE SODIUM 50 UG/1
75 TABLET ORAL DAILY
Qty: 30 TABLET | Refills: 11 | Status: SHIPPED | OUTPATIENT
Start: 2024-03-18 | End: 2024-03-18 | Stop reason: SDUPTHER

## 2024-03-26 NOTE — PROGRESS NOTES
Attending physician addendum-  Patient discussed in clinic with the resident.   Care provided is appropriate.    [employer] message sent.

## 2024-04-09 ENCOUNTER — HOSPITAL ENCOUNTER (OUTPATIENT)
Dept: RADIOLOGY | Facility: HOSPITAL | Age: 64
Discharge: HOME | End: 2024-04-09
Attending: STUDENT IN AN ORGANIZED HEALTH CARE EDUCATION/TRAINING PROGRAM
Payer: MEDICAID

## 2024-04-09 DIAGNOSIS — I50.20 HFREF (HEART FAILURE WITH REDUCED EJECTION FRACTION): ICD-10-CM

## 2024-04-09 PROCEDURE — 71046 X-RAY EXAM CHEST 2 VIEWS: CPT | Mod: TC

## 2024-04-12 DIAGNOSIS — I50.20 HFREF (HEART FAILURE WITH REDUCED EJECTION FRACTION): ICD-10-CM

## 2024-04-12 RX ORDER — METOPROLOL SUCCINATE 50 MG/1
50 TABLET, EXTENDED RELEASE ORAL 2 TIMES DAILY
Qty: 180 TABLET | Refills: 3 | Status: SHIPPED | OUTPATIENT
Start: 2024-04-12

## 2024-05-13 ENCOUNTER — LAB VISIT (OUTPATIENT)
Dept: LAB | Facility: HOSPITAL | Age: 64
End: 2024-05-13
Attending: STUDENT IN AN ORGANIZED HEALTH CARE EDUCATION/TRAINING PROGRAM
Payer: MEDICAID

## 2024-05-13 ENCOUNTER — OFFICE VISIT (OUTPATIENT)
Dept: INTERNAL MEDICINE | Facility: CLINIC | Age: 64
End: 2024-05-13
Payer: MEDICAID

## 2024-05-13 VITALS
HEART RATE: 67 BPM | BODY MASS INDEX: 26.8 KG/M2 | RESPIRATION RATE: 19 BRPM | SYSTOLIC BLOOD PRESSURE: 100 MMHG | TEMPERATURE: 98 F | WEIGHT: 157 LBS | HEIGHT: 64 IN | DIASTOLIC BLOOD PRESSURE: 69 MMHG | OXYGEN SATURATION: 97 %

## 2024-05-13 DIAGNOSIS — I25.10 CORONARY ARTERY DISEASE, UNSPECIFIED VESSEL OR LESION TYPE, UNSPECIFIED WHETHER ANGINA PRESENT, UNSPECIFIED WHETHER NATIVE OR TRANSPLANTED HEART: ICD-10-CM

## 2024-05-13 DIAGNOSIS — E78.2 MIXED HYPERLIPIDEMIA: ICD-10-CM

## 2024-05-13 DIAGNOSIS — Z87.891 FORMER SMOKER: Chronic | ICD-10-CM

## 2024-05-13 DIAGNOSIS — I48.0 PAROXYSMAL ATRIAL FIBRILLATION: ICD-10-CM

## 2024-05-13 DIAGNOSIS — I10 PRIMARY HYPERTENSION: ICD-10-CM

## 2024-05-13 DIAGNOSIS — E03.9 ACQUIRED HYPOTHYROIDISM: Chronic | ICD-10-CM

## 2024-05-13 DIAGNOSIS — I50.20 HFREF (HEART FAILURE WITH REDUCED EJECTION FRACTION): Primary | ICD-10-CM

## 2024-05-13 DIAGNOSIS — I25.10 ATHEROSCLEROSIS OF NATIVE CORONARY ARTERY OF NATIVE HEART WITHOUT ANGINA PECTORIS: ICD-10-CM

## 2024-05-13 DIAGNOSIS — E78.5 HYPERLIPIDEMIA, UNSPECIFIED HYPERLIPIDEMIA TYPE: Chronic | ICD-10-CM

## 2024-05-13 DIAGNOSIS — F51.01 PRIMARY INSOMNIA: Chronic | ICD-10-CM

## 2024-05-13 LAB
HIV 1+2 AB+HIV1 P24 AG SERPL QL IA: NONREACTIVE
TSH SERPL-ACNC: 4.81 UIU/ML (ref 0.35–4.94)

## 2024-05-13 PROCEDURE — 36415 COLL VENOUS BLD VENIPUNCTURE: CPT

## 2024-05-13 PROCEDURE — 99214 OFFICE O/P EST MOD 30 MIN: CPT | Mod: PBBFAC | Performed by: STUDENT IN AN ORGANIZED HEALTH CARE EDUCATION/TRAINING PROGRAM

## 2024-05-13 PROCEDURE — 87389 HIV-1 AG W/HIV-1&-2 AB AG IA: CPT

## 2024-05-13 PROCEDURE — 84443 ASSAY THYROID STIM HORMONE: CPT

## 2024-05-13 RX ORDER — ATORVASTATIN CALCIUM 80 MG/1
80 TABLET, FILM COATED ORAL DAILY
Qty: 90 TABLET | Refills: 3 | Status: SHIPPED | OUTPATIENT
Start: 2024-05-13

## 2024-05-13 RX ORDER — LEVOTHYROXINE SODIUM 75 UG/1
75 TABLET ORAL DAILY
Qty: 30 TABLET | Refills: 11 | Status: SHIPPED | OUTPATIENT
Start: 2024-05-13

## 2024-05-13 RX ORDER — SPIRONOLACTONE 25 MG/1
50 TABLET ORAL DAILY
Qty: 60 TABLET | Refills: 11 | Status: CANCELLED | OUTPATIENT
Start: 2024-05-13 | End: 2025-05-13

## 2024-05-13 RX ORDER — RAMELTEON 8 MG/1
8 TABLET ORAL NIGHTLY
Qty: 30 TABLET | Refills: 2 | Status: SHIPPED | OUTPATIENT
Start: 2024-05-13

## 2024-05-13 RX ORDER — ALBUTEROL SULFATE 90 UG/1
2 AEROSOL, METERED RESPIRATORY (INHALATION) EVERY 4 HOURS PRN
Qty: 18 G | Refills: 11 | Status: SHIPPED | OUTPATIENT
Start: 2024-05-13

## 2024-05-13 NOTE — PROGRESS NOTES
"[facility] Visit    Chief Complaint:      Follow-up (Pt here today for f/u visit 5. No distress noted at this time. Took a.m.meds. )     Subjective:     HPI:  Nicole Garcia is a 61 year old male with PMH of HFrEF w/ EF 10% 2/2 ICMO, CAD with hx of stent x1 to LAD in June 2021, HLD, HTN, A-fib, former smoker who is returning to clinic for follow up, last seen 4/17/2023. Only complaint today is of insomnia, describes difficulty falling asleep more so than staying asleep. Limited success with melatonin. Denies chest pain, shortness of breath, palpitations, orthopnea, [place], syncope, light-headedness. Has tolerated Jardiance well since starting with normal home BP's. No other issues.      Review of Systems  Constitutional: no fever, fatigue, weakness; + insomnia  Eye: no vision loss, eye redness, drainage, or pain  ENMT: no sore throat, ear pain, sinus pain/congestion, nasal congestion/drainage  Respiratory: no cough, no wheezing, no shortness of breath  Cardiovascular: no chest pain, no palpitations, no edema  Gastrointestinal: no nausea, vomiting, or diarrhea. No abdominal pain  Genitourinary: no dysuria, no urinary frequency or urgency, no hematuria  Hema/Lymph: no abnormal bruising or bleeding  Endocrine: no heat or cold intolerance, no excessive thirst or excessive urination  Musculoskeletal: no muscle or joint pain, no joint swelling  Integumentary: no skin rash or abnormal lesion  Neurologic: no headache, no dizziness, no weakness or numbness    Objective:   Last 24 Hour Vital Signs:  Vitals  BP: 100/69  Temp: 97.8 °F (36.6 °C)  Temp Source: Oral  Pulse: 67  Resp: 19  SpO2: 97 %  Height: 5' 4" (162.6 cm)  Weight: 71.2 kg (157 lb)    Physical Examination:    General: well-developed well-nourished  male in no acute distress  Eye: PERRLA, EOMI, clear conjunctiva, eyelids normal  HENT: NC/AT, moist mucus membranes  Neck: full range of motion, no thyromegaly or lymphadenopathy  Respiratory: " "clear to auscultation bilaterally, respirations nonlabored  Cardiovascular: regular rate and rhythm without murmurs, gallops or rubs, ICD to left chest wall  Gastrointestinal: soft, non-tender, non-distended with normal bowel sounds, without masses to palpation  Musculoskeletal: full range of motion of all extremities/spine without limitation or discomfort  Integumentary: warm to touch, no rashes or skin lesions present  Extremities: distal pulses intact, no peripheral edema noted  Neurologic: AAOx4, CN II-XII grossly intact, no signs of peripheral neurological deficit, motor/sensory function intact    Labs  BMP:   Lab Results   Component Value Date    CHLORIDE 98 03/14/2024    CO2 28 03/14/2024    BUN 15.6 03/14/2024    CREATININE 1.52 (H) 03/14/2024    GLUCOSE 87 03/14/2024    CALCIUM 9.5 03/14/2024     CBC:   Lab Results   Component Value Date    WBC 9.3 04/17/2023    HGB 14.7 04/17/2023    HCT 45.3 04/17/2023    MCV 86.9 04/17/2023    RDW 13.2 04/17/2023     LFTs:   Lab Results   Component Value Date    LABPROT 7.4 03/14/2024    ALBUMIN 4.0 03/14/2024    AST 25 03/14/2024    ALT 22 03/14/2024    ALKPHOS 130 03/14/2024     FLP:   Lab Results   Component Value Date    CHOL 135 03/14/2024    HDL 42 03/14/2024    LDL 64.00 03/14/2024    TRIG 147 (H) 03/14/2024    TOTALCHOLEST 3 03/14/2024     DM:   Lab Results   Component Value Date    HGBA1C 5.8 02/15/2022    .8 02/15/2022    LDLCALC 6 05/08/2021    CREATININE 1.52 (H) 03/14/2024    CREATRANDUR 197.1 (H) 05/02/2021    PROTEINURINE 23.6 05/02/2021     Thyroid:   Lab Results   Component Value Date    TSH 7.449 (H) 05/31/2023    JANASC7SDMD 1.37 05/31/2023     Anemia: No results found for: "IRON", "TIBC", "FERRITIN", "RESBUCTJ41", "FOLATE"          Assessment & Plan:     HFrEF  -Stage C. Class I-II symptoms.   -GDMT: metoprolol succinate 50 mg daily, Entresto 24-26 mg BID, Aldactone 25 mg daily; started on Jardiance 10 mg daily at last cardiology visit "   -Continue Bumex 2 mg BID.  -Pt has not had TTE in several years, will order repeat to assess EF    Chronic A-fib  -CHADSVASc 3  -Continue Xarelto 20 mg daily    CAD s/p PCI to LAD 2021  -Continue SAPT with Plavix 75 mg daily, atorvastatin 80 mg daily also on Xarelto for a-fib as above    -Continue Toprol-XL 50 mg daily.    Hypertension  -Pt's /69 at goal, continue GDMT as above    Hyperlipidemia  - LDL: 64 at goal < 70  - Continue atorvastatin 80 mg daily, pt has been tolerating for years despite egg allergy     Hypothyroidism  -TSH elevated previously; TSH ordered for today  -Continue Synthroid 75 mcg daily, will adjust if needed based on TSH    Insomnia  -Pt still having sleep issues  -Will trial Ramelteon 8 mg nightly    Former Smoker  -Pt states he has 44 pack-year hx quit 2 years ago  -Low dose lung CA screening ordered    Health Maintenance  -Lung CA screening: Low dose CT 3/16/22 WNL Lung-rads 2; repeat ordered 5/13/24  -Colon CA Screening: Cologuard+, rescheduled for c-scope due to continued antiplatelets past hold date  -Vaccinations:   -PNA: needs PSV23 repeated now and Prevnar 13 at age 66- refusing both   -TdAP: needs, refusing   -Covid: needs, refusing   -Flu: needs, refusing    To be addressed at next follow-up  -C-scope completion  -labs    Follow-ups  -Follow-up medicine clinic in 3 months      Aric Shirley MD  [employer] IM PGY III

## 2024-05-27 ENCOUNTER — HOSPITAL ENCOUNTER (OUTPATIENT)
Dept: RADIOLOGY | Facility: HOSPITAL | Age: 64
Discharge: HOME | End: 2024-05-27
Attending: STUDENT IN AN ORGANIZED HEALTH CARE EDUCATION/TRAINING PROGRAM
Payer: MEDICAID

## 2024-05-27 DIAGNOSIS — Z87.891 FORMER SMOKER: Chronic | ICD-10-CM

## 2024-05-27 PROCEDURE — 71271 CT THORAX LUNG CANCER SCR C-: CPT | Mod: TC

## 2024-05-31 NOTE — PROGRESS NOTES
Here for routine device check    
Plan: Recommended Antihistamine (Zyrtec, Allegra)
Detail Level: Detailed

## 2024-06-14 RX ORDER — POTASSIUM CHLORIDE 750 MG/1
10 TABLET, EXTENDED RELEASE ORAL ONCE
COMMUNITY
End: 2024-06-14 | Stop reason: SDUPTHER

## 2024-06-14 RX ORDER — POTASSIUM CHLORIDE 750 MG/1
10 TABLET, EXTENDED RELEASE ORAL 2 TIMES DAILY
Qty: 180 TABLET | Refills: 3 | Status: SHIPPED | OUTPATIENT
Start: 2024-06-14

## 2024-06-27 ENCOUNTER — HOSPITAL ENCOUNTER (OUTPATIENT)
Dept: CARDIOLOGY | Facility: HOSPITAL | Age: 64
Discharge: HOME | End: 2024-06-27
Attending: STUDENT IN AN ORGANIZED HEALTH CARE EDUCATION/TRAINING PROGRAM
Payer: MEDICAID

## 2024-06-27 VITALS
WEIGHT: 157 LBS | BODY MASS INDEX: 26.8 KG/M2 | DIASTOLIC BLOOD PRESSURE: 60 MMHG | HEIGHT: 64 IN | SYSTOLIC BLOOD PRESSURE: 107 MMHG

## 2024-06-27 DIAGNOSIS — I50.20 HFREF (HEART FAILURE WITH REDUCED EJECTION FRACTION): ICD-10-CM

## 2024-06-27 LAB
APICAL FOUR CHAMBER EJECTION FRACTION: 34 %
APICAL TWO CHAMBER EJECTION FRACTION: 38 %
AV INDEX (PROSTH): 0.44
AV MEAN GRADIENT: 3 MMHG
AV PEAK GRADIENT: 6 MMHG
AV VALVE AREA BY VELOCITY RATIO: 1.89 CM²
AV VALVE AREA: 1.79 CM²
AV VELOCITY RATIO: 0.47
BSA FOR ECHO PROCEDURE: 1.79 M2
CV ECHO LV RWT: 0.35 CM
DOP CALC AO PEAK VEL: 1.2 M/S
DOP CALC AO VTI: 23.3 CM
DOP CALC LVOT AREA: 4 CM2
DOP CALC LVOT DIAMETER: 2.27 CM
DOP CALC LVOT PEAK VEL: 0.56 M/S
DOP CALC LVOT STROKE VOLUME: 41.66 CM3
DOP CALC MV VTI: 16 CM
DOP CALCLVOT PEAK VEL VTI: 10.3 CM
E WAVE DECELERATION TIME: 220.7 MSEC
E/A RATIO: 0.77
E/E' RATIO: 5 M/S
ECHO LV POSTERIOR WALL: 0.98 CM (ref 0.6–1.1)
FRACTIONAL SHORTENING: 15 % (ref 28–44)
INTERVENTRICULAR SEPTUM: 1.2 CM (ref 0.6–1.1)
LEFT ATRIUM SIZE: 3.62 CM
LEFT INTERNAL DIMENSION IN SYSTOLE: 4.81 CM (ref 2.1–4)
LEFT VENTRICLE DIASTOLIC VOLUME INDEX: 89.99 ML/M2
LEFT VENTRICLE DIASTOLIC VOLUME: 158.38 ML
LEFT VENTRICLE END DIASTOLIC VOLUME APICAL 2 CHAMBER: 63.47 ML
LEFT VENTRICLE END DIASTOLIC VOLUME APICAL 4 CHAMBER: 89.39 ML
LEFT VENTRICLE MASS INDEX: 143 G/M2
LEFT VENTRICLE SYSTOLIC VOLUME INDEX: 61.5 ML/M2
LEFT VENTRICLE SYSTOLIC VOLUME: 108.17 ML
LEFT VENTRICULAR INTERNAL DIMENSION IN DIASTOLE: 5.67 CM (ref 3.5–6)
LEFT VENTRICULAR MASS: 251.41 G
LV LATERAL E/E' RATIO: 4.29 M/S
LV SEPTAL E/E' RATIO: 6 M/S
LVED V (TEICH): 158.38 ML
LVES V (TEICH): 108.17 ML
LVOT MG: 0.73 MMHG
LVOT MV: 0.4 CM/S
MV MEAN GRADIENT: 0 MMHG
MV PEAK A VEL: 0.39 M/S
MV PEAK E VEL: 0.3 M/S
MV PEAK GRADIENT: 1 MMHG
MV STENOSIS PRESSURE HALF TIME: 64 MS
MV VALVE AREA BY CONTINUITY EQUATION: 2.6 CM2
MV VALVE AREA P 1/2 METHOD: 3.44 CM2
OHS CV RV/LV RATIO: 0.5 CM
OHS LV EJECTION FRACTION SIMPSONS BIPLANE MOD: 35 %
PISA TR MAX VEL: 1.63 M/S
RA PRESSURE ESTIMATED: 3 MMHG
RIGHT VENTRICULAR END-DIASTOLIC DIMENSION: 2.82 CM
RV TB RVSP: 5 MMHG
TDI LATERAL: 0.07 M/S
TDI SEPTAL: 0.05 M/S
TDI: 0.06 M/S
TR MAX PG: 11 MMHG
TV REST PULMONARY ARTERY PRESSURE: 14 MMHG
Z-SCORE OF LEFT VENTRICULAR DIMENSION IN END DIASTOLE: 1.53
Z-SCORE OF LEFT VENTRICULAR DIMENSION IN END SYSTOLE: 3.71

## 2024-06-27 PROCEDURE — 93306 TTE W/DOPPLER COMPLETE: CPT

## 2024-07-18 ENCOUNTER — OFFICE VISIT (OUTPATIENT)
Dept: CARDIOLOGY | Facility: CLINIC | Age: 64
End: 2024-07-18
Payer: MEDICAID

## 2024-07-18 VITALS
SYSTOLIC BLOOD PRESSURE: 96 MMHG | OXYGEN SATURATION: 98 % | BODY MASS INDEX: 25.49 KG/M2 | HEART RATE: 68 BPM | HEIGHT: 65 IN | RESPIRATION RATE: 20 BRPM | WEIGHT: 153 LBS | DIASTOLIC BLOOD PRESSURE: 68 MMHG | TEMPERATURE: 98 F

## 2024-07-18 DIAGNOSIS — I25.10 ATHEROSCLEROSIS OF NATIVE CORONARY ARTERY OF NATIVE HEART WITHOUT ANGINA PECTORIS: ICD-10-CM

## 2024-07-18 DIAGNOSIS — E78.2 MIXED HYPERLIPIDEMIA: ICD-10-CM

## 2024-07-18 DIAGNOSIS — I10 PRIMARY HYPERTENSION: Primary | ICD-10-CM

## 2024-07-18 DIAGNOSIS — I48.0 PAROXYSMAL ATRIAL FIBRILLATION: ICD-10-CM

## 2024-07-18 PROCEDURE — 99213 OFFICE O/P EST LOW 20 MIN: CPT | Mod: PBBFAC | Performed by: INTERNAL MEDICINE

## 2024-07-18 NOTE — PROGRESS NOTES
"[facility] CARDIOLOGY  OFFICE VISIT NOTE    SUBJECTIVE:      HPI: Nicole Garcia is a 63 y.o. yo male w/ PMH of ischemic cardiomyopathy (EF 35% June 2024), Afib, HTN, HLD, CAD s/p PCI to LAD 5/2021, who presents today for F/U visit. He has no acute complaints at this time and is compliant with all of his home meds. His echo in June 2024 revealed increased EF at 35% (his past EF was 10%). He has no difficulties with activities and able to walk from the parking lot to cardiology clinic on 7th floor w/o difficulties    ROS:  (-) Chest pain, palpitations, SOB, fever, night sweats, chills, diarrhea, constipation.     OBJECTIVE:     Vital signs:   BP 96/68 (BP Location: Left arm, Patient Position: Sitting, BP Method: Medium (Automatic))   Pulse 68   Temp 97.7 °F (36.5 °C) (Oral)   Resp 20   Ht 5' 5" (1.651 m)   Wt 69.4 kg (153 lb)   SpO2 98%   BMI 25.46 kg/m²      Physical Examination:  General: Well nourished w/o distress  HEENT: AT/NC; conjunctiva clear; nasal and oral mucosa moist and clear  CV: S1, S2 w/o murmurs or gallops  Pulm: CTA bilaterally  GI: Soft with normal bowel sounds in all quadrants, no masses on palpation  Extremities: pulses present; no edema noted  Neuro: AOx4; no signs of peripheral neurological deficit    Current Outpatient Medications   Medication Instructions    albuterol (PROVENTIL/VENTOLIN HFA) 90 mcg/actuation inhaler 2 puffs, Inhalation, Every 4 hours PRN    amiodarone (PACERONE) 200 mg, Oral, Daily    atorvastatin (LIPITOR) 80 mg, Oral, Daily    bumetanide (BUMEX) 2 mg, Oral, 2 times daily    clopidogreL (PLAVIX) 75 mg, Oral, Daily    empagliflozin (JARDIANCE) 10 mg, Oral, Daily    ergocalciferol (ERGOCALCIFEROL) 50,000 unit Cap 1 capsule, Oral, Every 7 days    levothyroxine (SYNTHROID) 75 mcg, Oral, Daily    metoprolol succinate (TOPROL-XL) 50 mg, Oral, 2 times daily    potassium chloride (KLOR-CON) 10 MEQ TbSR 10 mEq, Oral, 2 times daily    ramelteon (ROZEREM) 8 mg, Oral, Nightly    " rivaroxaban (XARELTO) 20 mg, Oral, With dinner    sacubitriL-valsartan (ENTRESTO) 24-26 mg per tablet 1 tablet, Oral, 2 times daily    spironolactone (ALDACTONE) 25 mg, Oral, Daily    vitamin D (VITAMIN D3) 1,000 Units, Oral, Daily     ASSESSMENT & PLAN:     ICMO s/p PCI to LAD May 2021 in [facility]  HFrEF (EF 25% June 2024)  NYHA class II  - Denies any chest pain, shortness of breath, or palpitations (see HPI)  - Continue GDMT:  metoprolol succinate 50mg BID, Entresto 24/26 BID, Aldactone 25mg daily  - Continue Plavix 75mg, Atorvastatin 80mg, Bumex 2mg BID,   - ICD Device last interrogated 7.13.23- no events were recorded, no changes made  - Symptoms well-controlled at this time  - Continue risk-factor modication, low-salt diet, and continued tobacco abstinence     Afib-rate controlled  - Denies any symptoms today (see HPI)  - Continue metoprolol 50mg BID, amiodarone 200mg daily, Xarelto 20mg daily    Hx V-Tach  - On Amiodarone, LFT 3/14/2024 WNL   - Will refer patient to EP services in [place] to see if patient could be take off of Amiodarone     HTN, HLD  -Continue current medical management    Return to clinic in 4 months. Referral to EP services in [place] placed on 7/18/2024.    Osorio Turner DO   [facility] Internal Medicine, PGY-III

## 2024-08-01 ENCOUNTER — HOSPITAL ENCOUNTER (EMERGENCY)
Facility: HOSPITAL | Age: 64
Discharge: HOME | End: 2024-08-01
Attending: EMERGENCY MEDICINE
Payer: MEDICAID

## 2024-08-01 VITALS
HEIGHT: 65 IN | DIASTOLIC BLOOD PRESSURE: 85 MMHG | OXYGEN SATURATION: 100 % | RESPIRATION RATE: 20 BRPM | WEIGHT: 155 LBS | BODY MASS INDEX: 25.83 KG/M2 | SYSTOLIC BLOOD PRESSURE: 125 MMHG | TEMPERATURE: 98 F | HEART RATE: 70 BPM

## 2024-08-01 DIAGNOSIS — H10.9 BACTERIAL CONJUNCTIVITIS OF LEFT EYE: Primary | ICD-10-CM

## 2024-08-01 PROCEDURE — 99283 EMERGENCY DEPT VISIT LOW MDM: CPT

## 2024-08-01 PROCEDURE — 25000003 PHARM REV CODE 250: Performed by: PHYSICIAN ASSISTANT

## 2024-08-01 RX ORDER — IBUPROFEN 400 MG/1
800 TABLET ORAL
Status: DISCONTINUED | OUTPATIENT
Start: 2024-08-01 | End: 2024-08-01 | Stop reason: HOSPADM

## 2024-08-01 RX ORDER — TETRACAINE HYDROCHLORIDE 5 MG/ML
2 SOLUTION OPHTHALMIC
Status: COMPLETED | OUTPATIENT
Start: 2024-08-01 | End: 2024-08-01

## 2024-08-01 RX ORDER — ACETAMINOPHEN AND CODEINE PHOSPHATE 300; 30 MG/1; MG/1
1 TABLET ORAL
Status: COMPLETED | OUTPATIENT
Start: 2024-08-01 | End: 2024-08-01

## 2024-08-01 RX ORDER — ERYTHROMYCIN 5 MG/G
OINTMENT OPHTHALMIC EVERY 6 HOURS
Qty: 3.5 G | Refills: 0 | Status: SHIPPED | OUTPATIENT
Start: 2024-08-01 | End: 2024-08-08

## 2024-08-01 RX ADMIN — FLUORESCEIN SODIUM 1 EACH: 1 STRIP OPHTHALMIC at 06:08

## 2024-08-01 RX ADMIN — ACETAMINOPHEN AND CODEINE PHOSPHATE 1 TABLET: 300; 30 TABLET ORAL at 07:08

## 2024-08-01 RX ADMIN — TETRACAINE HYDROCHLORIDE 2 DROP: 5 SOLUTION OPHTHALMIC at 06:08

## 2024-08-28 DIAGNOSIS — F51.01 PRIMARY INSOMNIA: Chronic | ICD-10-CM

## 2024-08-28 RX ORDER — RAMELTEON 8 MG/1
8 TABLET ORAL NIGHTLY
Qty: 30 TABLET | Refills: 2 | Status: SHIPPED | OUTPATIENT
Start: 2024-08-28

## 2024-11-19 ENCOUNTER — OFFICE VISIT (OUTPATIENT)
Dept: CARDIOLOGY | Facility: CLINIC | Age: 64
End: 2024-11-19
Payer: MEDICAID

## 2024-11-19 VITALS
TEMPERATURE: 98 F | BODY MASS INDEX: 25.49 KG/M2 | WEIGHT: 153 LBS | SYSTOLIC BLOOD PRESSURE: 102 MMHG | HEIGHT: 65 IN | DIASTOLIC BLOOD PRESSURE: 66 MMHG | HEART RATE: 65 BPM | RESPIRATION RATE: 20 BRPM | OXYGEN SATURATION: 99 %

## 2024-11-19 DIAGNOSIS — E78.2 MIXED HYPERLIPIDEMIA: ICD-10-CM

## 2024-11-19 DIAGNOSIS — I10 PRIMARY HYPERTENSION: ICD-10-CM

## 2024-11-19 DIAGNOSIS — I48.0 PAROXYSMAL ATRIAL FIBRILLATION: ICD-10-CM

## 2024-11-19 DIAGNOSIS — I50.20 HFREF (HEART FAILURE WITH REDUCED EJECTION FRACTION): ICD-10-CM

## 2024-11-19 DIAGNOSIS — Z87.891 FORMER SMOKER: Chronic | ICD-10-CM

## 2024-11-19 DIAGNOSIS — I25.10 ATHEROSCLEROSIS OF NATIVE CORONARY ARTERY OF NATIVE HEART WITHOUT ANGINA PECTORIS: Primary | ICD-10-CM

## 2024-11-19 PROCEDURE — 99215 OFFICE O/P EST HI 40 MIN: CPT | Mod: PBBFAC | Performed by: INTERNAL MEDICINE

## 2024-11-19 NOTE — PATIENT INSTRUCTIONS
4 month  f/u, appointment below.    We will let you know if we are able to refer you to local EP.

## 2024-11-19 NOTE — PROGRESS NOTES
Cardiology attending addendum  Patient was seen and examined with YESICA Helton. Agree with plan as outlined below. 65 yo M with ischemic cardiomyopathy with improved EF from 10% to 35% per last echo in June 2024, CAD status post two-vessel PCI, atrial fibrillation who is here for follow-up.  In the past patient had 2 ICD shocks (patient not sure when but states more than 5 years ago).  He was referred to EP in [place] to assess if patient can be taken off amiodarone. He was evaluated by by Dr. Crawley who recommended proceeding with noninvasive program stimulation to assess for atrial or ventricular rhythms before stopping amiodarone but patient wants to follow-up locally.  He will be referred to Dr. Ajit Bah at [facility].  Last 2 device interrogations with no events.  Otherwise patient is with NYHA class 1 symptoms, euvolemic on exam.  Currently on Entresto 24/26 mg bid, Toprol 50 mg bid, Jardiance 10 mg and Aldactone 25 mg.  No room to up titrate GDMT.  Patient also on dapsone for his history of PCI.  LDL at goal.  Follow up in 4 months    Dee Gomez MD  Cardiology staff-[facility]

## 2024-11-19 NOTE — PROGRESS NOTES
"[facility] CARDIOLOGY  OFFICE VISIT NOTE    SUBJECTIVE:      HPI: Nicole Garcia is a 64 y.o. yo male w/ PMH of ischemic cardiomyopathy (EF 35% June 2024), Afib, HTN, HLD, CAD s/p PCI to LAD 5/2021, who presents today for F/U visit. Patient has no acute complaints at this time and is compliant with all of home meds. Reports no difficulties with activities and able to keep up with 14 year old daughter without limitations. Was seen by cardiologist in [place] for EP services to see if patient could be take off of Amiodarone. Patient would like to schedule test in [place]. Device was interrogated and demonstrated no arrhythmias between January - October 2024.     ROS:  (-) Chest pain, palpitations, SOB, sweats, chills, syncope, orthopnea, edema     OBJECTIVE:     Vital signs:   /66 (BP Location: Left arm, Patient Position: Sitting)   Pulse 65   Temp 98.2 °F (36.8 °C) (Oral)   Resp 20   Ht 5' 5" (1.651 m)   Wt 69.4 kg (153 lb)   SpO2 99%   BMI 25.46 kg/m²      Physical Examination:  General: Well nourished w/o distress  HEENT: AT/NC; conjunctiva clear; nasal and oral mucosa moist and clear  CV: S1, S2 w/o murmurs or gallops  Pulm: CTA bilaterally  GI: Soft with normal bowel sounds in all quadrants, no masses on palpation  Extremities: pulses present; no edema noted  Neuro: AOx4; no signs of peripheral neurological deficit    Current Outpatient Medications   Medication Instructions    albuterol (PROVENTIL/VENTOLIN HFA) 90 mcg/actuation inhaler 2 puffs, Inhalation, Every 4 hours PRN    amiodarone (PACERONE) 200 mg, Oral, Daily    atorvastatin (LIPITOR) 80 mg, Oral, Daily    bumetanide (BUMEX) 2 mg, Oral, 2 times daily    clopidogreL (PLAVIX) 75 mg, Oral, Daily    empagliflozin (JARDIANCE) 10 mg, Oral, Daily    ergocalciferol (ERGOCALCIFEROL) 50,000 unit Cap 1 capsule, Every 7 days    levothyroxine (SYNTHROID) 75 mcg, Oral, Daily    metoprolol succinate (TOPROL-XL) 50 mg, Oral, 2 times daily    potassium " chloride (KLOR-CON) 10 MEQ TbSR 10 mEq, Oral, 2 times daily    ramelteon (ROZEREM) 8 mg, Oral, Nightly    rivaroxaban (XARELTO) 20 mg, Oral, With dinner    sacubitriL-valsartan (ENTRESTO) 24-26 mg per tablet 1 tablet, Oral, 2 times daily    spironolactone (ALDACTONE) 25 mg, Oral, Daily    vitamin D (VITAMIN D3) 1,000 Units, Daily     ASSESSMENT & PLAN:     ICMO s/p PCI to LAD May 2021 in [facility]  HFrEF (EF 35% June 2024)  NYHA class II  - Denies any chest pain, shortness of breath, or palpitations (see HPI)  - Continue GDMT:  metoprolol succinate 50mg BID, Entresto 24/26 BID, Aldactone 25mg daily  - Continue Plavix 75mg, Atorvastatin 80mg, Bumex 2mg BID,   - ICD Device last interrogated 10/30/24- no events were recorded, no changes made  - Symptoms well-controlled at this time  - Continue risk-factor modication, low-salt diet, and continued tobacco abstinence     Afib-rate controlled  - Denies any symptoms today (see HPI)  - Continue metoprolol 50mg BID, amiodarone 200mg daily, Xarelto 20mg daily    Hx V-Tach  - On Amiodarone, LFT 3/14/2024 WNL   - Referred patient for EP service at [facility]    HTN, HLD  -Continue current medical management    Return to clinic in 4 months.     Jessica Guerin, MS4  [facility] Cardiology

## 2024-12-02 DIAGNOSIS — I48.0 PAROXYSMAL ATRIAL FIBRILLATION: ICD-10-CM

## 2024-12-03 ENCOUNTER — CLINICAL SUPPORT (OUTPATIENT)
Dept: CARDIOLOGY | Facility: CLINIC | Age: 64
End: 2024-12-03
Payer: MEDICAID

## 2024-12-03 DIAGNOSIS — Z95.810 AICD (AUTOMATIC CARDIOVERTER/DEFIBRILLATOR) PRESENT: Primary | ICD-10-CM

## 2024-12-03 PROCEDURE — 99211 OFF/OP EST MAY X REQ PHY/QHP: CPT | Mod: PBBFAC

## 2024-12-03 PROCEDURE — 93283 PRGRMG EVAL IMPLANTABLE DFB: CPT | Mod: PBBFAC | Performed by: INTERNAL MEDICINE

## 2024-12-03 RX ORDER — AMIODARONE HYDROCHLORIDE 200 MG/1
200 TABLET ORAL DAILY
Qty: 90 TABLET | Refills: 3 | Status: SHIPPED | OUTPATIENT
Start: 2024-12-03

## 2024-12-23 DIAGNOSIS — I48.0 PAROXYSMAL ATRIAL FIBRILLATION: ICD-10-CM

## 2025-01-10 DIAGNOSIS — I50.20 HFREF (HEART FAILURE WITH REDUCED EJECTION FRACTION): ICD-10-CM

## 2025-01-10 RX ORDER — SPIRONOLACTONE 25 MG/1
25 TABLET ORAL DAILY
Qty: 30 TABLET | Refills: 0 | Status: SHIPPED | OUTPATIENT
Start: 2025-01-10

## 2025-01-10 NOTE — TELEPHONE ENCOUNTER
Pt reports he will be out of med completely, wants to know if you will fill and he will do lab on Monday or Tues.

## 2025-01-28 ENCOUNTER — LAB VISIT (OUTPATIENT)
Dept: LAB | Facility: HOSPITAL | Age: 65
End: 2025-01-28
Attending: INTERNAL MEDICINE
Payer: MEDICAID

## 2025-01-28 DIAGNOSIS — I50.20 HFREF (HEART FAILURE WITH REDUCED EJECTION FRACTION): ICD-10-CM

## 2025-01-28 LAB
ANION GAP SERPL CALC-SCNC: 10 MEQ/L
BUN SERPL-MCNC: 9.6 MG/DL (ref 8.4–25.7)
CALCIUM SERPL-MCNC: 9.5 MG/DL (ref 8.8–10)
CHLORIDE SERPL-SCNC: 100 MMOL/L (ref 98–107)
CO2 SERPL-SCNC: 28 MMOL/L (ref 23–31)
CREAT SERPL-MCNC: 1.12 MG/DL (ref 0.72–1.25)
CREAT/UREA NIT SERPL: 9
GFR SERPLBLD CREATININE-BSD FMLA CKD-EPI: >60 ML/MIN/1.73/M2
GLUCOSE SERPL-MCNC: 92 MG/DL (ref 82–115)
POTASSIUM SERPL-SCNC: 3.3 MMOL/L (ref 3.5–5.1)
SODIUM SERPL-SCNC: 138 MMOL/L (ref 136–145)

## 2025-01-28 PROCEDURE — 36415 COLL VENOUS BLD VENIPUNCTURE: CPT

## 2025-01-28 PROCEDURE — 80048 BASIC METABOLIC PNL TOTAL CA: CPT

## 2025-02-04 DIAGNOSIS — I25.10 CORONARY ARTERY DISEASE, UNSPECIFIED VESSEL OR LESION TYPE, UNSPECIFIED WHETHER ANGINA PRESENT, UNSPECIFIED WHETHER NATIVE OR TRANSPLANTED HEART: ICD-10-CM

## 2025-02-04 RX ORDER — CLOPIDOGREL BISULFATE 75 MG/1
75 TABLET ORAL DAILY
Qty: 90 TABLET | Refills: 3 | Status: SHIPPED | OUTPATIENT
Start: 2025-02-04

## 2025-02-17 DIAGNOSIS — I50.20 HFREF (HEART FAILURE WITH REDUCED EJECTION FRACTION): ICD-10-CM

## 2025-02-18 ENCOUNTER — RESULTS FOLLOW-UP (OUTPATIENT)
Dept: HEPATOLOGY | Facility: HOSPITAL | Age: 65
End: 2025-02-18
Payer: MEDICAID

## 2025-02-18 ENCOUNTER — LAB VISIT (OUTPATIENT)
Dept: LAB | Facility: HOSPITAL | Age: 65
End: 2025-02-18
Attending: INTERNAL MEDICINE
Payer: MEDICAID

## 2025-02-18 ENCOUNTER — OFFICE VISIT (OUTPATIENT)
Dept: INTERNAL MEDICINE | Facility: CLINIC | Age: 65
End: 2025-02-18
Payer: MEDICAID

## 2025-02-18 VITALS
TEMPERATURE: 98 F | OXYGEN SATURATION: 99 % | WEIGHT: 152.63 LBS | RESPIRATION RATE: 19 BRPM | HEIGHT: 65 IN | BODY MASS INDEX: 25.43 KG/M2 | DIASTOLIC BLOOD PRESSURE: 75 MMHG | HEART RATE: 76 BPM | SYSTOLIC BLOOD PRESSURE: 112 MMHG

## 2025-02-18 DIAGNOSIS — I50.20 HFREF (HEART FAILURE WITH REDUCED EJECTION FRACTION): ICD-10-CM

## 2025-02-18 DIAGNOSIS — Z13.1 SCREENING FOR DIABETES MELLITUS: ICD-10-CM

## 2025-02-18 DIAGNOSIS — K76.9 HEPATIC LESION: ICD-10-CM

## 2025-02-18 DIAGNOSIS — E03.9 ACQUIRED HYPOTHYROIDISM: ICD-10-CM

## 2025-02-18 DIAGNOSIS — I25.10 ATHEROSCLEROSIS OF NATIVE CORONARY ARTERY OF NATIVE HEART WITHOUT ANGINA PECTORIS: ICD-10-CM

## 2025-02-18 DIAGNOSIS — F51.01 PRIMARY INSOMNIA: Chronic | ICD-10-CM

## 2025-02-18 DIAGNOSIS — I10 PRIMARY HYPERTENSION: ICD-10-CM

## 2025-02-18 DIAGNOSIS — Z12.2 SCREENING FOR LUNG CANCER: ICD-10-CM

## 2025-02-18 DIAGNOSIS — R19.5 POSITIVE COLORECTAL CANCER SCREENING USING COLOGUARD TEST: ICD-10-CM

## 2025-02-18 DIAGNOSIS — I10 PRIMARY HYPERTENSION: Primary | ICD-10-CM

## 2025-02-18 DIAGNOSIS — I48.0 PAROXYSMAL ATRIAL FIBRILLATION: ICD-10-CM

## 2025-02-18 DIAGNOSIS — E03.9 ACQUIRED HYPOTHYROIDISM: Chronic | ICD-10-CM

## 2025-02-18 DIAGNOSIS — Z00.00 ANNUAL WELLNESS VISIT: ICD-10-CM

## 2025-02-18 DIAGNOSIS — E78.2 MIXED HYPERLIPIDEMIA: ICD-10-CM

## 2025-02-18 DIAGNOSIS — J44.9 CHRONIC OBSTRUCTIVE PULMONARY DISEASE, UNSPECIFIED COPD TYPE: ICD-10-CM

## 2025-02-18 DIAGNOSIS — Z87.891 FORMER SMOKER: Chronic | ICD-10-CM

## 2025-02-18 LAB
25(OH)D3+25(OH)D2 SERPL-MCNC: 38 NG/ML (ref 30–80)
ALBUMIN SERPL-MCNC: 4.2 G/DL (ref 3.4–4.8)
ALBUMIN/GLOB SERPL: 1.1 RATIO (ref 1.1–2)
ALP SERPL-CCNC: 150 UNIT/L (ref 40–150)
ALT SERPL-CCNC: 16 UNIT/L (ref 0–55)
ANION GAP SERPL CALC-SCNC: 7 MEQ/L
AST SERPL-CCNC: 24 UNIT/L (ref 5–34)
BASOPHILS # BLD AUTO: 0.12 X10(3)/MCL
BASOPHILS NFR BLD AUTO: 1.2 %
BILIRUB SERPL-MCNC: 0.5 MG/DL
BUN SERPL-MCNC: 15.2 MG/DL (ref 8.4–25.7)
CALCIUM SERPL-MCNC: 9.7 MG/DL (ref 8.8–10)
CHLORIDE SERPL-SCNC: 99 MMOL/L (ref 98–107)
CHOLEST SERPL-MCNC: 130 MG/DL
CHOLEST/HDLC SERPL: 4 {RATIO} (ref 0–5)
CO2 SERPL-SCNC: 31 MMOL/L (ref 23–31)
CREAT SERPL-MCNC: 1.2 MG/DL (ref 0.72–1.25)
CREAT/UREA NIT SERPL: 13
EOSINOPHIL # BLD AUTO: 0.46 X10(3)/MCL (ref 0–0.9)
EOSINOPHIL NFR BLD AUTO: 4.7 %
ERYTHROCYTE [DISTWIDTH] IN BLOOD BY AUTOMATED COUNT: 16 % (ref 11.5–17)
EST. AVERAGE GLUCOSE BLD GHB EST-MCNC: 114 MG/DL
GFR SERPLBLD CREATININE-BSD FMLA CKD-EPI: >60 ML/MIN/1.73/M2
GLOBULIN SER-MCNC: 3.9 GM/DL (ref 2.4–3.5)
GLUCOSE SERPL-MCNC: 100 MG/DL (ref 82–115)
HBA1C MFR BLD: 5.6 %
HCT VFR BLD AUTO: 43.6 % (ref 42–52)
HDLC SERPL-MCNC: 35 MG/DL (ref 35–60)
HGB BLD-MCNC: 12.7 G/DL (ref 14–18)
IMM GRANULOCYTES # BLD AUTO: 0.04 X10(3)/MCL (ref 0–0.04)
IMM GRANULOCYTES NFR BLD AUTO: 0.4 %
LDLC SERPL CALC-MCNC: 68 MG/DL (ref 50–140)
LYMPHOCYTES # BLD AUTO: 1.1 X10(3)/MCL (ref 0.6–4.6)
LYMPHOCYTES NFR BLD AUTO: 11.2 %
MAGNESIUM SERPL-MCNC: 1.9 MG/DL (ref 1.6–2.6)
MCH RBC QN AUTO: 21.6 PG (ref 27–31)
MCHC RBC AUTO-ENTMCNC: 29.1 G/DL (ref 33–36)
MCV RBC AUTO: 74.1 FL (ref 80–94)
MONOCYTES # BLD AUTO: 1.07 X10(3)/MCL (ref 0.1–1.3)
MONOCYTES NFR BLD AUTO: 10.9 %
NEUTROPHILS # BLD AUTO: 7.05 X10(3)/MCL (ref 2.1–9.2)
NEUTROPHILS NFR BLD AUTO: 71.6 %
NRBC BLD AUTO-RTO: 0 %
PLATELET # BLD AUTO: 490 X10(3)/MCL (ref 130–400)
PMV BLD AUTO: 9.3 FL (ref 7.4–10.4)
POTASSIUM SERPL-SCNC: 3.6 MMOL/L (ref 3.5–5.1)
PROT SERPL-MCNC: 8.1 GM/DL (ref 5.8–7.6)
RBC # BLD AUTO: 5.88 X10(6)/MCL (ref 4.7–6.1)
SODIUM SERPL-SCNC: 137 MMOL/L (ref 136–145)
T4 FREE SERPL-MCNC: 1.42 NG/DL (ref 0.7–1.48)
TRIGL SERPL-MCNC: 136 MG/DL (ref 34–140)
TSH SERPL-ACNC: 5.14 UIU/ML (ref 0.35–4.94)
VLDLC SERPL CALC-MCNC: 27 MG/DL
WBC # BLD AUTO: 9.84 X10(3)/MCL (ref 4.5–11.5)

## 2025-02-18 PROCEDURE — 84439 ASSAY OF FREE THYROXINE: CPT

## 2025-02-18 PROCEDURE — 99215 OFFICE O/P EST HI 40 MIN: CPT | Mod: PBBFAC

## 2025-02-18 PROCEDURE — 82306 VITAMIN D 25 HYDROXY: CPT

## 2025-02-18 PROCEDURE — 36415 COLL VENOUS BLD VENIPUNCTURE: CPT

## 2025-02-18 PROCEDURE — 80053 COMPREHEN METABOLIC PANEL: CPT

## 2025-02-18 PROCEDURE — 83735 ASSAY OF MAGNESIUM: CPT

## 2025-02-18 PROCEDURE — 80061 LIPID PANEL: CPT

## 2025-02-18 PROCEDURE — 85025 COMPLETE CBC W/AUTO DIFF WBC: CPT

## 2025-02-18 PROCEDURE — 83036 HEMOGLOBIN GLYCOSYLATED A1C: CPT

## 2025-02-18 PROCEDURE — 84443 ASSAY THYROID STIM HORMONE: CPT

## 2025-02-18 RX ORDER — BUMETANIDE 2 MG/1
2 TABLET ORAL 2 TIMES DAILY
Qty: 180 TABLET | Refills: 3 | Status: SHIPPED | OUTPATIENT
Start: 2025-02-18

## 2025-02-18 RX ORDER — LEVOTHYROXINE SODIUM 75 UG/1
75 TABLET ORAL DAILY
Qty: 30 TABLET | Refills: 11 | Status: SHIPPED | OUTPATIENT
Start: 2025-02-18

## 2025-02-18 RX ORDER — UMECLIDINIUM BROMIDE AND VILANTEROL TRIFENATATE 62.5; 25 UG/1; UG/1
1 POWDER RESPIRATORY (INHALATION) DAILY
Qty: 60 EACH | Refills: 2 | Status: SHIPPED | OUTPATIENT
Start: 2025-02-18

## 2025-02-18 NOTE — PROGRESS NOTES
[facility] Internal Medicine Clinic Visit    Chief Complaint:      Follow-up (Pt here today for f/u visit. No distress noted at this time. Took a.m.. meds. )     Subjective:     HPI:  Nicole Garcia is a 61 year old male with PMH of HFimpEF, CAD with hx of stent x1 to LAD in June 2021, HLD, HTN, Hypothyroidism, A-fib, former smoker who is returning to clinic for follow up last seen in June 2024.   Have problems with sleeping, only have an hr or so sleep intermittently. Reports that he has trouble initiating sleep takes more than 30 minutes for him to fall asleep and can not maintain sleep for more than 1-2 hours.  Reports that he has tried ramelteon on before but has not been helping.  Advised him to follow conservative method with on medication. Reports to be using albuterol inhaler twice daily whenever he has shortness of breath.  Reports that albuterol controls the symptoms.  Reports that he uses it once or twice in a month during the night.  He has a history of positive Cologuard in the history 2022, was supposed to follow up with gastroenterology , but no records can be observed.  Denies any trouble peeing, reports increases urination frequency secondary to Bumex. Denies any pain during micturition.    Denies chest pain, shortness of breath, palpitations, orthopnea, [place], syncope, light-headedness. Has tolerated Jardiance well since starting with normal home BP's. No other issues.    History : Smoke pack and half a day( 2 pack sometimes) - quit 2 years. Drinks one -fifth whisky - quit yr. H/o weed 40 yrs ago.    Review of Systems  Constitutional: no fever, fatigue, weakness; + insomnia  Eye: no vision loss, eye redness, drainage, or pain  ENMT: no sore throat, ear pain, sinus pain/congestion, nasal congestion/drainage  Respiratory: no cough, no wheezing, no shortness of breath  Cardiovascular: no chest pain, no palpitations, no edema  Gastrointestinal: no nausea, vomiting, or diarrhea. No abdominal pain  Genitourinary:  "no dysuria, no urinary frequency or urgency, no hematuria  Hema/Lymph: no abnormal bruising or bleeding  Endocrine: no heat or cold intolerance, no excessive thirst or excessive urination  Musculoskeletal: no muscle or joint pain, no joint swelling  Integumentary: no skin rash or abnormal lesion  Neurologic: no headache, no dizziness, no weakness or numbness    Objective:   Last 24 Hour Vital Signs:  Vitals  BP: 112/75  Temp: 98.4 °F (36.9 °C)  Pulse: 76  Resp: 19  SpO2: 99 %  Height: 5' 5" (165.1 cm)  Weight: 69.2 kg (152 lb 9.6 oz)    Physical Examination:  Physical Exam  Constitutional:       General: He is not in acute distress.     Appearance: Normal appearance. He is normal weight. He is not ill-appearing or toxic-appearing.   HENT:      Head: Normocephalic and atraumatic.      Right Ear: Tympanic membrane and external ear normal. There is impacted cerumen.      Left Ear: Tympanic membrane and external ear normal. There is impacted cerumen.      Nose: Nose normal. No congestion or rhinorrhea.      Mouth/Throat:      Mouth: Mucous membranes are moist.      Pharynx: Oropharynx is clear. No oropharyngeal exudate or posterior oropharyngeal erythema.   Eyes:      General: No scleral icterus.        Right eye: No discharge.      Extraocular Movements: Extraocular movements intact.      Conjunctiva/sclera: Conjunctivae normal.      Pupils: Pupils are equal, round, and reactive to light.   Cardiovascular:      Rate and Rhythm: Normal rate and regular rhythm.      Pulses: Normal pulses.      Heart sounds: Normal heart sounds. No murmur heard.     No friction rub. No gallop.   Pulmonary:      Effort: Pulmonary effort is normal.      Breath sounds: Normal breath sounds.   Chest:      Chest wall: No tenderness.   Abdominal:      General: Abdomen is flat. Bowel sounds are normal. There is no distension.      Palpations: Abdomen is soft. There is no mass.      Tenderness: There is no abdominal tenderness. There is no " "guarding.   Musculoskeletal:         General: No swelling, tenderness or deformity.      Cervical back: Normal range of motion and neck supple. No rigidity or tenderness.      Right lower leg: No edema.      Left lower leg: No edema.   Lymphadenopathy:      Cervical: No cervical adenopathy.   Skin:     General: Skin is warm and dry.      Capillary Refill: Capillary refill takes less than 2 seconds.      Coloration: Skin is not jaundiced or pale.   Neurological:      General: No focal deficit present.      Mental Status: He is alert and oriented to person, place, and time. Mental status is at baseline.      Cranial Nerves: No cranial nerve deficit.      Gait: Gait normal.        Labs  BMP:   Lab Results   Component Value Date    CO2 28 01/28/2025    BUN 9.6 01/28/2025    CREATININE 1.12 01/28/2025    GLUCOSE 92 01/28/2025    CALCIUM 9.5 01/28/2025     CBC:   Lab Results   Component Value Date    WBC 9.3 04/17/2023    HGB 14.7 04/17/2023    HCT 45.3 04/17/2023    MCV 86.9 04/17/2023    RDW 13.2 04/17/2023     LFTs:   Lab Results   Component Value Date    LABPROT 7.4 03/14/2024    ALBUMIN 4.0 03/14/2024    AST 25 03/14/2024    ALT 22 03/14/2024    ALKPHOS 130 03/14/2024     FLP:   Lab Results   Component Value Date    CHOL 135 03/14/2024    HDL 42 03/14/2024    LDL 64.00 03/14/2024    TRIG 147 (H) 03/14/2024    TOTALCHOLEST 3 03/14/2024     DM:   Lab Results   Component Value Date    HGBA1C 5.8 02/15/2022    .8 02/15/2022    LDLCALC 6 05/08/2021    CREATININE 1.12 01/28/2025    CREATRANDUR 197.1 (H) 05/02/2021    PROTEINURINE 23.6 05/02/2021     Thyroid:   Lab Results   Component Value Date    TSH 4.813 05/13/2024    YOOLWB2DOWH 1.37 05/31/2023     Anemia: No results found for: "IRON", "TIBC", "FERRITIN", "[id number]", "FOLATE"          Assessment & Plan:     Ischemic Cardiomyopathy   HFimpEF s/p ICD (2010)  - Stage C. Class I-II symptoms.   - Previous EF of 10% and ICD placed. Recent repeat TTE showed EF of 35% " with no significant valve abnormalities.   - GDMT: metoprolol succinate 50 mg BID, Entresto 24-26 mg BID, Aldactone 25 mg daily; Jardiance 10 mg daily -Continue Bumex 2 mg BID.  Last device interrogation in 10/20/24: No arrhythmias. He was evaluated by by Dr. Crawley who recommended proceeding with noninvasive program stimulation to assess for atrial or ventricular rhythms before stopping amiodarone but patient wants to follow-up locally.  He will be referred to [facility] Ajit Bah at [facility].  Last 2 device interrogations with no events.    Chronic A-fib  -CHADSVASc 3  - ON Toprol 50mg BID, Amiodarone 200mg daily   -Continue Xarelto 20 mg daily    CAD s/p 2 vessel PCI 2021  Hyperlipidemia  -Continue SAPT with Plavix 75 mg daily, atorvastatin 80 mg daily also on Xarelto for a-fib as above    -Continue Toprol-XL 50 mg bid.  - LDL: 64 at goal < 70  - Continue atorvastatin 80 mg daily, pt has been tolerating for years despite egg allergy    Hypertension  -Pt's BP at goal today, continue GDMT as above    Hyperlipidemia  - LDL: 64 at goal < 70. Repeat ordered today   - Continue atorvastatin 80 mg daily, pt has been tolerating for years despite egg allergy     Hypothyroidism  -TSH elevated previously; TSH ordered for today  -Continue Synthroid 75 mcg daily, will adjust if needed based on TSH    Insomnia  -Pt still having sleep issues  -Will trial Ramelteon 8 mg nightly- no effect   - Spoke to the about any stressors in life, patient reports none.  Reports that he does not have any new and it is received recently.  He has been having insomnia since past 1 or 2 years.  Reports that he does not have screen time before sleeping.  Also advised him to follow conservative measures, meditation if possible twice daily, aroma therapy, chamomile tea etcetera before medical management for insomnia.    Former Smoker  -Pt states he has 44 pack-year hx quit 2 years ago  -Low dose lung CA screening ordered: Lung-RADS category: 2 - lung  findings with benign appearance/behavior   -repeat ordered today for in the next 3 months.    Health Maintenance  -Lung CA screening: Low dose CT 3/16/22 WNL Lung-rads 2; repeat ordered 5/13/25  -Colon CA Screening: Cologuard+, rescheduled for c-scope due to continued antiplatelets past hold date  -Vaccinations:   -PNA: needs PSV23 repeated now and Prevnar 13 at age 66- refusing both as the patient has egg allergy.  Explained him that the chances of adverse events with egg allergy are rare but continues to refuse.   -TdAP: needs, refusing   -Covid: needs, refusing   -Flu: needs, refusing    To be addressed at next follow-up  -C-scope completion  -labs    Ena Urbina MD  Internal Medicine - PGY-1

## 2025-02-18 NOTE — TELEPHONE ENCOUNTER
----- Message from Ena Urbina sent at 2/18/2025  2:52 PM CST -----  Please let the patient know that his labs yesterday were within normal limits.  ----- Message -----  From: Lab, Background User  Sent: 2/18/2025   9:46 AM CST  To: Ena Urbina MD

## 2025-03-14 DIAGNOSIS — I50.20 HFREF (HEART FAILURE WITH REDUCED EJECTION FRACTION): ICD-10-CM

## 2025-03-14 RX ORDER — SPIRONOLACTONE 25 MG/1
25 TABLET ORAL DAILY
Qty: 30 TABLET | Refills: 0 | Status: SHIPPED | OUTPATIENT
Start: 2025-03-14

## 2025-03-17 DIAGNOSIS — I50.20 HFREF (HEART FAILURE WITH REDUCED EJECTION FRACTION): ICD-10-CM

## 2025-03-18 ENCOUNTER — CLINICAL SUPPORT (OUTPATIENT)
Dept: CARDIOLOGY | Facility: CLINIC | Age: 65
End: 2025-03-18
Payer: MEDICAID

## 2025-03-18 DIAGNOSIS — Z95.810 AICD (AUTOMATIC CARDIOVERTER/DEFIBRILLATOR) PRESENT: Primary | ICD-10-CM

## 2025-03-18 PROCEDURE — 93282 PRGRMG EVAL IMPLANTABLE DFB: CPT | Mod: PBBFAC | Performed by: INTERNAL MEDICINE

## 2025-03-18 PROCEDURE — 99211 OFF/OP EST MAY X REQ PHY/QHP: CPT | Mod: PBBFAC

## 2025-03-18 RX ORDER — SACUBITRIL AND VALSARTAN 24; 26 MG/1; MG/1
1 TABLET, FILM COATED ORAL 2 TIMES DAILY
Qty: 180 TABLET | Refills: 3 | Status: SHIPPED | OUTPATIENT
Start: 2025-03-18

## 2025-03-20 ENCOUNTER — OFFICE VISIT (OUTPATIENT)
Dept: CARDIOLOGY | Facility: CLINIC | Age: 65
End: 2025-03-20
Payer: MEDICAID

## 2025-03-20 VITALS
BODY MASS INDEX: 24.99 KG/M2 | SYSTOLIC BLOOD PRESSURE: 128 MMHG | OXYGEN SATURATION: 100 % | WEIGHT: 150 LBS | DIASTOLIC BLOOD PRESSURE: 79 MMHG | HEART RATE: 77 BPM | HEIGHT: 65 IN | RESPIRATION RATE: 16 BRPM

## 2025-03-20 DIAGNOSIS — I48.11 LONGSTANDING PERSISTENT ATRIAL FIBRILLATION: ICD-10-CM

## 2025-03-20 DIAGNOSIS — I50.20 HFREF (HEART FAILURE WITH REDUCED EJECTION FRACTION): Primary | ICD-10-CM

## 2025-03-20 DIAGNOSIS — E03.9 ACQUIRED HYPOTHYROIDISM: Chronic | ICD-10-CM

## 2025-03-20 PROCEDURE — 99213 OFFICE O/P EST LOW 20 MIN: CPT | Mod: PBBFAC,25 | Performed by: INTERNAL MEDICINE

## 2025-03-20 PROCEDURE — 93005 ELECTROCARDIOGRAM TRACING: CPT

## 2025-03-20 RX ORDER — METOPROLOL SUCCINATE 50 MG/1
50 TABLET, EXTENDED RELEASE ORAL 2 TIMES DAILY
Qty: 180 TABLET | Refills: 3 | Status: SHIPPED | OUTPATIENT
Start: 2025-03-20

## 2025-03-20 RX ORDER — SPIRONOLACTONE 25 MG/1
25 TABLET ORAL DAILY
Qty: 30 TABLET | Refills: 0 | Status: SHIPPED | OUTPATIENT
Start: 2025-03-20

## 2025-03-20 RX ORDER — LEVOTHYROXINE SODIUM 75 UG/1
75 TABLET ORAL DAILY
Qty: 30 TABLET | Refills: 11 | Status: SHIPPED | OUTPATIENT
Start: 2025-03-20

## 2025-03-20 NOTE — PROGRESS NOTES
"Cardiology Attending  03/20/2025 1:29 PM    I evaluated Nicole Garcia in Cardiology Clinic and discussed the patient's symptoms, findings, and management plan with the resident.   Mr. Nicole Garcia is a 64 y.o. male.  The patient is seen in cardiology clinic for hyperlipidemia, HFrEF, atrial fibrillation.  Patient had his amiodarone discontinued.  He continues on anticoagulation.    Blood pressure 128/79, pulse 77, resp. rate 16, height 5' 5" (1.651 m), weight 68 kg (150 lb), SpO2 100%.  The patient is in no apparent distress.    LEVEL OF EXERTION:  Patient states his daughter keeps him busy.    Current Outpatient Medications   Medication Instructions    albuterol (PROVENTIL/VENTOLIN HFA) 90 mcg/actuation inhaler 2 puffs, Inhalation, Every 4 hours PRN    atorvastatin (LIPITOR) 80 mg, Oral, Daily    bumetanide (BUMEX) 2 mg, Oral, 2 times daily    clopidogreL (PLAVIX) 75 mg, Oral, Daily    empagliflozin (JARDIANCE) 10 mg, Oral, Daily    ergocalciferol (ERGOCALCIFEROL) 50,000 unit Cap 1 capsule, Every 7 days    levothyroxine (SYNTHROID) 75 mcg, Oral, Daily    metoprolol succinate (TOPROL-XL) 50 mg, Oral, 2 times daily    potassium chloride (KLOR-CON) 10 MEQ TbSR 10 mEq, Oral, 2 times daily    ramelteon (ROZEREM) 8 mg, Oral, Nightly    rivaroxaban (XARELTO) 20 mg, Oral, With dinner    sacubitriL-valsartan (ENTRESTO) 24-26 mg per tablet 1 tablet, Oral, 2 times daily    spironolactone (ALDACTONE) 25 mg, Oral, Daily    umeclidinium-vilanteroL (ANORO ELLIPTA) 62.5-25 mcg/actuation DsDv 1 puff, Inhalation, Daily, Controller    vitamin D (VITAMIN D3) 1,000 Units, Daily       Results for orders placed during the hospital encounter of 06/27/24    Echo    Interpretation Summary    Left Ventricle: The left ventricle is normal in size. Normal wall thickness. There is reduced systolic function. Biplane (2D) method of discs ejection fraction is 35%.    Right Ventricle: Normal right ventricular cavity size. Systolic function is " normal.    Aortic Valve: The aortic valve is a trileaflet valve. There is no stenosis. Aortic valve peak velocity is 1.20 m/s. Mean gradient is 3 mmHg.    Mitral Valve: The mitral valve is structurally normal. There is normal leaflet mobility. There is mild regurgitation.    Pulmonary Artery: The estimated pulmonary artery systolic pressure is 14 mmHg.    Pericardium: There is no pericardial effusion.    No results found for this or any previous visit.    Results for orders placed in visit on 08/29/18    CATH LAB PROCEDURE      Lab Results   Component Value Date    CHOL 130 02/18/2025      Lab Results   Component Value Date    HGB 12.7 (L) 02/18/2025      Lab Results   Component Value Date    CREATININE 1.20 02/18/2025      Lab Results   Component Value Date    BNP 1,057.6 (H) 12/16/2021        Cont Jardiance 10 mg daily   On return to clinic if BP has been optimized on his GDMT, can consider obtaining an echo to see if EF is improving    Tk Woodard MD

## 2025-03-20 NOTE — PROGRESS NOTES
"[facility] CARDIOLOGY  OFFICE VISIT NOTE    SUBJECTIVE:      HPI: Nicole Garcia is a 64 y.o. yo male w/ PMH of ischemic cardiomyopathy (EF 35% June 2024, has ICD), Afib, HTN, HLD, CAD s/p PCI to LAD 5/2021, who presents today for F/U visit. Patient has no complaints and is feeling overall well and has no new limitations to daily activities. Patient had recent device check with no events. Was referred to EP previously for non-invasive program stimulation test to determine if can stop taking amio. Was taken off of amio by EP in [place], and was told to return for ablation if has any events. In addition, has f/u in 04/2025 with EP. The patient denies headaches, changes in vision, nausea, emesis, fevers, chills, chest pain, palpitations, dyspnea, abdominal pain, or changes in urinary or bowel habits.       ROS:  (-) Chest pain, palpitations, SOB, sweats, chills, syncope, orthopnea, edema     OBJECTIVE:     Vital signs:   /79 (BP Location: Left arm, Patient Position: Sitting)   Pulse 77   Resp 16   Ht 5' 5" (1.651 m)   Wt 68 kg (150 lb)   SpO2 100%   BMI 24.96 kg/m²      Physical Examination:  General: Well nourished w/o distress  HEENT: AT/NC; conjunctiva clear; nasal and oral mucosa moist and clear  CV: S1, S2 w/o murmurs or gallops  Pulm: CTA bilaterally  GI: Soft with normal bowel sounds in all quadrants, no masses on palpation  Extremities: pulses present; no edema noted  Neuro: AOx4; no signs of peripheral neurological deficit    Current Outpatient Medications   Medication Instructions    albuterol (PROVENTIL/VENTOLIN HFA) 90 mcg/actuation inhaler 2 puffs, Inhalation, Every 4 hours PRN    atorvastatin (LIPITOR) 80 mg, Oral, Daily    bumetanide (BUMEX) 2 mg, Oral, 2 times daily    clopidogreL (PLAVIX) 75 mg, Oral, Daily    empagliflozin (JARDIANCE) 10 mg, Oral, Daily    ergocalciferol (ERGOCALCIFEROL) 50,000 unit Cap 1 capsule, Every 7 days    levothyroxine (SYNTHROID) 75 mcg, Oral, Daily    metoprolol " succinate (TOPROL-XL) 50 mg, Oral, 2 times daily    potassium chloride (KLOR-CON) 10 MEQ TbSR 10 mEq, Oral, 2 times daily    ramelteon (ROZEREM) 8 mg, Oral, Nightly    rivaroxaban (XARELTO) 20 mg, Oral, With dinner    sacubitriL-valsartan (ENTRESTO) 24-26 mg per tablet 1 tablet, Oral, 2 times daily    spironolactone (ALDACTONE) 25 mg, Oral, Daily    umeclidinium-vilanteroL (ANORO ELLIPTA) 62.5-25 mcg/actuation DsDv 1 puff, Inhalation, Daily, Controller    vitamin D (VITAMIN D3) 1,000 Units, Daily     ASSESSMENT & PLAN:     ICMO s/p PCI to LAD May 2021 in [facility]  HFrEF (EF 35% June 2024)  NYHA class II  - Denies any chest pain, shortness of breath, or palpitations (see HPI)  - Continue GDMT:  metoprolol succinate 50mg BID, Entresto 24/26 BID, Aldactone 25mg daily  - Continue Plavix 75mg, Atorvastatin 80mg, Bumex 2mg BID  - will continue jardiance 10 mg daily  - ICD Device last interrogated 3/18/2025- no events were recorded, no changes made  - Symptoms well-controlled at this time  - Continue risk-factor modication, low-salt diet, and continued tobacco abstinence  - will repeat echo next time     Afib-rate controlled  - Denies any symptoms today (see HPI)  - Continue metoprolol 50mg BID, Xarelto 20mg daily    Hx V-Tach  - On Amiodarone, LFT 3/14/2024 WNL   - Referred patient for EP service at [facility], seen recently, stopped amio  - no longer on amio, taken off per EP in [place]; has f/u in 04/2025    HTN, HLD  -Continue current medical management    Return to clinic in 4 months.     Ignacio Samaniego MD  [facility] Card[facility] PGY-1

## 2025-03-21 LAB
OHS QRS DURATION: 114 MS
OHS QTC CALCULATION: 469 MS

## 2025-03-31 ENCOUNTER — HOSPITAL ENCOUNTER (OUTPATIENT)
Dept: RADIOLOGY | Facility: HOSPITAL | Age: 65
Discharge: HOME | End: 2025-03-31
Payer: MEDICAID

## 2025-03-31 ENCOUNTER — HOSPITAL ENCOUNTER (OUTPATIENT)
Dept: PULMONOLOGY | Facility: HOSPITAL | Age: 65
Discharge: HOME | End: 2025-03-31
Payer: MEDICAID

## 2025-03-31 DIAGNOSIS — Z87.891 FORMER SMOKER: Chronic | ICD-10-CM

## 2025-03-31 DIAGNOSIS — F51.01 PRIMARY INSOMNIA: Chronic | ICD-10-CM

## 2025-03-31 DIAGNOSIS — J44.9 CHRONIC OBSTRUCTIVE PULMONARY DISEASE, UNSPECIFIED COPD TYPE: ICD-10-CM

## 2025-03-31 PROCEDURE — 94726 PLETHYSMOGRAPHY LUNG VOLUMES: CPT

## 2025-03-31 PROCEDURE — 94010 BREATHING CAPACITY TEST: CPT

## 2025-03-31 PROCEDURE — 76705 ECHO EXAM OF ABDOMEN: CPT | Mod: TC

## 2025-03-31 PROCEDURE — 94729 DIFFUSING CAPACITY: CPT

## 2025-03-31 NOTE — PROGRESS NOTES
Good cooperation and effort given. Was unable to give pt Albuterol due to pt taking home med prior to testing.  PFT completed.

## 2025-04-04 LAB
DLCO SINGLE BREATH LLN: 17.42
DLCO SINGLE BREATH PRE REF: 52.4 %
DLCO SINGLE BREATH REF: 24.35
DLCOC SBVA LLN: 2.62
DLCOC SBVA REF: 3.98
DLCOC SINGLE BREATH LLN: 17.42
DLCOC SINGLE BREATH REF: 24.35
DLCOVA LLN: 2.62
DLCOVA PRE REF: 55 %
DLCOVA PRE: 2.19 ML/(MIN*MMHG*L) (ref 2.62–5.35)
DLCOVA REF: 3.98
ERV LLN: -16448.98
ERV PRE REF: 60.2 %
ERV REF: 1.02
FEF 25 75 LLN: 1.48
FEF 25 75 PRE REF: 61.8 %
FEF 25 75 REF: 3.19
FEV1 FVC LLN: 65
FEV1 FVC PRE REF: 93.6 %
FEV1 FVC REF: 78
FEV1 LLN: 2.18
FEV1 PRE REF: 99.6 %
FEV1 REF: 2.94
FRCPLETH LLN: 2.35
FRCPLETH PREREF: 88.4 %
FRCPLETH REF: 3.34
FVC LLN: 2.85
FVC PRE REF: 106.4 %
FVC REF: 3.77
IVC PRE: 3.84 L (ref 2.85–4.7)
IVC SINGLE BREATH LLN: 2.85
IVC SINGLE BREATH PRE REF: 101.8 %
IVC SINGLE BREATH REF: 3.77
MVV LLN: 95
MVV PRE REF: 75 %
MVV REF: 111
PEF LLN: 5.91
PEF PRE REF: 67.6 %
PEF REF: 7.9
PRE DLCO: 12.77 ML/(MIN*MMHG) (ref 17.42–31.28)
PRE ERV: 0.61 L (ref -16448.98–16451.02)
PRE FEF 25 75: 1.97 L/S (ref 1.48–4.9)
PRE FET 100: 6.77 SEC
PRE FEV1 FVC: 72.87 % (ref 65.28–88.9)
PRE FEV1: 2.92 L (ref 2.18–3.64)
PRE FRC PL: 2.95 L (ref 2.35–4.33)
PRE FVC: 4.01 L (ref 2.85–4.7)
PRE MVV: 83.42 L/MIN (ref 94.6–127.99)
PRE PEF: 5.34 L/S (ref 5.91–9.9)
PRE RV: 2.34 L (ref 1.64–2.99)
PRE TLC: 6.35 L (ref 4.96–7.26)
RAW LLN: 3.06
RAW PRE REF: 138.1 %
RAW PRE: 4.23 CMH2O*S/L (ref 3.06–3.06)
RAW REF: 3.06
RV LLN: 1.64
RV PRE REF: 100.8 %
RV REF: 2.32
RVTLC LLN: 30
RVTLC PRE REF: 95.6 %
RVTLC PRE: 36.82 % (ref 29.55–47.51)
RVTLC REF: 39
TLC LLN: 4.96
TLC PRE REF: 103.9 %
TLC REF: 6.11
VA PRE: 5.83 L (ref 5.96–5.96)
VA SINGLE BREATH LLN: 5.96
VA SINGLE BREATH PRE REF: 97.8 %
VA SINGLE BREATH REF: 5.96
VC LLN: 2.85
VC PRE REF: 106.4 %
VC PRE: 4.01 L (ref 2.85–4.7)
VC REF: 3.77

## 2025-05-13 ENCOUNTER — OFFICE VISIT (OUTPATIENT)
Dept: INTERNAL MEDICINE | Facility: CLINIC | Age: 65
End: 2025-05-13
Payer: MEDICAID

## 2025-05-13 VITALS
TEMPERATURE: 98 F | OXYGEN SATURATION: 100 % | HEART RATE: 74 BPM | HEIGHT: 65 IN | RESPIRATION RATE: 20 BRPM | DIASTOLIC BLOOD PRESSURE: 74 MMHG | BODY MASS INDEX: 25.61 KG/M2 | SYSTOLIC BLOOD PRESSURE: 114 MMHG | WEIGHT: 153.69 LBS

## 2025-05-13 DIAGNOSIS — J44.9 CHRONIC OBSTRUCTIVE PULMONARY DISEASE, UNSPECIFIED COPD TYPE: ICD-10-CM

## 2025-05-13 DIAGNOSIS — Z12.2 SCREENING FOR LUNG CANCER: ICD-10-CM

## 2025-05-13 DIAGNOSIS — I10 PRIMARY HYPERTENSION: Primary | ICD-10-CM

## 2025-05-13 DIAGNOSIS — F51.01 PRIMARY INSOMNIA: Chronic | ICD-10-CM

## 2025-05-13 DIAGNOSIS — I25.10 ATHEROSCLEROSIS OF NATIVE CORONARY ARTERY OF NATIVE HEART WITHOUT ANGINA PECTORIS: ICD-10-CM

## 2025-05-13 DIAGNOSIS — Z00.00 ANNUAL WELLNESS VISIT: ICD-10-CM

## 2025-05-13 DIAGNOSIS — Z87.891 FORMER SMOKER: Chronic | ICD-10-CM

## 2025-05-13 PROCEDURE — 99214 OFFICE O/P EST MOD 30 MIN: CPT | Mod: PBBFAC

## 2025-05-13 RX ORDER — RAMELTEON 8 MG/1
8 TABLET ORAL NIGHTLY
Qty: 30 TABLET | Refills: 2 | Status: SHIPPED | OUTPATIENT
Start: 2025-05-13

## 2025-05-13 RX ORDER — UMECLIDINIUM BROMIDE AND VILANTEROL TRIFENATATE 62.5; 25 UG/1; UG/1
1 POWDER RESPIRATORY (INHALATION) DAILY
Qty: 60 EACH | Refills: 2 | Status: SHIPPED | OUTPATIENT
Start: 2025-05-13

## 2025-05-13 NOTE — PROGRESS NOTES
[facility] Internal Medicine Clinic Visit    Chief Complaint:      Follow-up (Pt here today for fu visit. No distress noted at this time. )     Subjective:     HPI:  Nicole Garcia is a 61 year old male with PMH of HFimpEF with ICD, CAD with hx of stent x1 to LAD in June 2021, HLD, HTN, Hypothyroidism, A-fib, former smoker who is returning to clinic for follow up.     Patient reports that he feels good. Denies any active complaints today. Wants to know about his results of ultrasound and PFT.   Explained to him that His PFT show mildly reduced DLCO, possibly due to history of smoking. USG abd showed stable hemangioma.   Pt talks about chronic sleeping issues, was asking for something for sleep.  Also has been compliant with Anoro inhaler, reports observed improvement.  Reports did not have to use albuterol.   Patient has a history of positive Cologuard in 2022, was supposed to follow up with Gastroenterology, referral sent to Dr. Botello.  Patient denied the visit.  When asked today, patient reports that he today get a colonoscopy done.  Does not have any active GI problems going on denies any constipation, weight loss, satiety.  We will continue to address it in the next visit.  Compliant to all of his medication.    History : Smoke pack and half a day( 2 pack sometimes) - quit 2 years. Drinks one -fifth whisky - quit yr. H/o weed 40 yrs ago.    Review of Systems  Constitutional: no fever, fatigue, weakness; + insomnia  Eye: no vision loss, eye redness, drainage, or pain  ENMT: no sore throat, ear pain, sinus pain/congestion, nasal congestion/drainage  Respiratory: no cough, no wheezing, no shortness of breath  Cardiovascular: no chest pain, no palpitations, no edema  Gastrointestinal: no nausea, vomiting, or diarrhea. No abdominal pain  Genitourinary: no dysuria, no urinary frequency or urgency, no hematuria  Hema/Lymph: no abnormal bruising or bleeding  Endocrine: no heat or cold intolerance, no excessive thirst or  "excessive urination  Musculoskeletal: no muscle or joint pain, no joint swelling  Integumentary: no skin rash or abnormal lesion  Neurologic: no headache, no dizziness, no weakness or numbness    Objective:   Last 24 Hour Vital Signs:  Vitals  BP: 114/74  Temp: 97.6 °F (36.4 °C)  Temp Source: Oral  Pulse: 74  Resp: 20  SpO2: 100 %  Height: 5' 5" (165.1 cm)  Weight: 69.7 kg (153 lb 10.6 oz)    Physical Examination:  Physical Exam  Constitutional:       General: He is not in acute distress.     Appearance: Normal appearance. He is normal weight. He is not ill-appearing or toxic-appearing.   HENT:      Head: Normocephalic and atraumatic.      Right Ear: External ear normal.      Left Ear: External ear normal.      Nose: Nose normal. No congestion or rhinorrhea.      Mouth/Throat:      Mouth: Mucous membranes are moist.      Pharynx: Oropharynx is clear. No oropharyngeal exudate or posterior oropharyngeal erythema.   Eyes:      General: No scleral icterus.        Right eye: No discharge.      Extraocular Movements: Extraocular movements intact.      Conjunctiva/sclera: Conjunctivae normal.      Pupils: Pupils are equal, round, and reactive to light.   Cardiovascular:      Rate and Rhythm: Normal rate. Rhythm irregular.      Pulses: Normal pulses.      Heart sounds: Normal heart sounds. No murmur heard.     No friction rub. No gallop.   Pulmonary:      Effort: Pulmonary effort is normal.      Breath sounds: Normal breath sounds.   Chest:      Chest wall: No tenderness.   Abdominal:      General: Abdomen is flat. Bowel sounds are normal. There is no distension.      Palpations: Abdomen is soft. There is no mass.      Tenderness: There is no abdominal tenderness. There is no guarding.   Musculoskeletal:         General: No swelling, tenderness or deformity.      Cervical back: Normal range of motion and neck supple. No rigidity or tenderness.      Right lower leg: No edema.      Left lower leg: No edema.   Lymphadenopathy: " "     Cervical: No cervical adenopathy.   Skin:     General: Skin is warm and dry.      Capillary Refill: Capillary refill takes less than 2 seconds.      Coloration: Skin is not jaundiced or pale.      Comments: Has a bruise on right arm.   Neurological:      General: No focal deficit present.      Mental Status: He is alert and oriented to person, place, and time. Mental status is at baseline.      Cranial Nerves: No cranial nerve deficit.      Gait: Gait normal.        Labs  BMP:   Lab Results   Component Value Date    CO2 31 02/18/2025    BUN 15.2 02/18/2025    CREATININE 1.20 02/18/2025    CALCIUM 9.7 02/18/2025     CBC:   Lab Results   Component Value Date    WBC 9.84 02/18/2025    HGB 12.7 (L) 02/18/2025    HCT 43.6 02/18/2025    MCV 74.1 (L) 02/18/2025    RDW 16.0 02/18/2025     LFTs:   Lab Results   Component Value Date    LABPROT 14.3 (H) 05/13/2021    ALBUMIN 4.2 02/18/2025    AST 24 02/18/2025    ALT 16 02/18/2025    ALKPHOS 150 02/18/2025     FLP:   Lab Results   Component Value Date    CHOL 130 02/18/2025    HDL 35 02/18/2025    LDL 68.00 02/18/2025    TRIG 136 02/18/2025    TOTALCHOLEST 4 02/18/2025     DM:   Lab Results   Component Value Date    HGBA1C 5.6 02/18/2025    .0 02/18/2025    LDLCALC 6 05/08/2021    CREATININE 1.20 02/18/2025    CREATRANDUR 197.1 (H) 05/02/2021    PROTEINURINE 23.6 05/02/2021     Thyroid:   Lab Results   Component Value Date    TSH 5.138 (H) 02/18/2025    KZRIUA4OLGO 1.42 02/18/2025     Anemia: No results found for: "IRON", "TIBC", "FERRITIN", "[id number]", "FOLATE"          Assessment & Plan:     Ischemic Cardiomyopathy   HFimpEF s/p ICD (2010)  - Stage C. Class I-II symptoms.   - Previous EF of 10% and ICD placed. Recent repeat TTE showed EF of 35% with no significant valve abnormalities in 2024.  - GDMT: metoprolol succinate 50 mg BID, Entresto 24-26 mg BID, Aldactone 25 mg daily; Jardiance 10 mg daily   -Continue Bumex 2 mg BID.  Last device interrogation in " 10/20/24: No arrhythmias. He was evaluated by by Dr. Crawley who recommended proceeding with noninvasive program stimulation to assess for atrial or ventricular rhythms before stopping amiodarone but patient wants to follow-up locally.  He will be referred to Dr. Ajit Bah at [facility].  Last 2 device interrogations with no events.  - continue following with [facility].    Chronic A-fib  -CHADSVASc 3  - ON Toprol 50mg BID, rate controlled today.  - amiodarone was stopped by the EP physician in 2024.  -Continue Xarelto 20 mg daily    CAD s/p 2 vessel PCI 2021  Hyperlipidemia  -Continue SAPT with Plavix 75 mg daily, atorvastatin 80 mg daily also on Xarelto for a-fib as above    -Continue Toprol-XL 50 mg bid.  - LDL: 64 at goal < 70  - Continue atorvastatin 80 mg daily, pt has been tolerating for years despite egg allergy.  We will repeat lipid panel in the next visit.    Hypertension  -Pt's BP at goal today, continue GDMT as above    Hyperlipidemia  - LDL: 64 at goal < 70. Repeat ordered today   - Continue atorvastatin 80 mg daily, pt has been tolerating for years despite egg allergy     Hypothyroidism  -TSH elevated previously; TSH ordered for today  -Continue Synthroid 75 mcg daily, will adjust if needed based on TSH    Insomnia  -Pt still having sleep issues  -Will continue Ramelteon 8 mg nightly  - Spoke to the about any stressors in life, patient reports none.  Reports that he does not have any new and it is received recently.  He has been having insomnia since past 1 or 2 years.  Reports that he does not have screen time before sleeping.  Also advised him to follow conservative measures, meditation if possible twice daily, aroma therapy, chamomile tea etcetera before medical management for insomnia.    Former Smoker  -Pt states he has 44 pack-year hx quit 2 years ago  -Low dose lung CA screening ordered: Lung-RADS category: 2 - lung findings with benign appearance/behavior   -repeat ordered today for in the  next 3 months.    Health Maintenance  -Lung CA screening: Low dose CT 3/16/22 WNL Lung-rads 2; repeat ordered 5/13/25  -Colon CA Screening: Cologuard+, rescheduled for c-scope due to continued antiplatelets past hold date  -Vaccinations:   -PNA: needs PSV23 repeated now and Prevnar 13 at age 66- refusing both as the patient has egg allergy.  Explained him that the chances of adverse events with egg allergy are rare but continues to refuse.   -TdAP: needs, refusing   -Covid: needs, refusing   -Flu: needs, refusing    To be addressed at next follow-up  -C-scope completion  - CT chest for lung screen    Ena Urbina MD  Internal Medicine - PGY-1

## 2025-05-13 NOTE — PROGRESS NOTES
Faculty addendum:     I have reviewed and concur with the resident's history, physical, assessment, and plan.  I have discussed with him all issues related to the diagnosis, workup and treatment plan.Care provided as reasonable and necessary.

## 2025-05-14 ENCOUNTER — TELEPHONE (OUTPATIENT)
Dept: INTERNAL MEDICINE | Facility: CLINIC | Age: 65
End: 2025-05-14
Payer: MEDICAID

## 2025-05-14 DIAGNOSIS — F51.01 PRIMARY INSOMNIA: Primary | Chronic | ICD-10-CM

## 2025-05-14 NOTE — TELEPHONE ENCOUNTER
Patient called name and  verified. Patient called 2025 inquiring about Rozerem 8mg, he was unable to  from pharmacy due to medication not being covered. Upon looking in pt chart the PA was denied. Please send in alternative medication or Please advise.

## 2025-05-19 RX ORDER — TRAZODONE HYDROCHLORIDE 50 MG/1
25 TABLET ORAL NIGHTLY
Qty: 30 TABLET | Refills: 1 | Status: SHIPPED | OUTPATIENT
Start: 2025-05-19 | End: 2025-09-16

## 2025-05-19 NOTE — TELEPHONE ENCOUNTER
Understood. Ordered trazodone for patient for insomnia. Will started at 25mg daily at night. Advise the patient to do not drive or avoid any concentration requiring actions after taking the medication.     Ena Urbina MD  Internal Medicine - PGY-1

## 2025-05-20 NOTE — TELEPHONE ENCOUNTER
Pt called, name and  verified. Pt informed of new medication ordered and the precautions to follow after ingesting his sleep medications. Pt verbalized 100% understanding. No further questions or concerns noted. Call ended.

## 2025-05-26 DIAGNOSIS — E78.5 HYPERLIPIDEMIA, UNSPECIFIED HYPERLIPIDEMIA TYPE: Chronic | ICD-10-CM

## 2025-05-27 RX ORDER — ATORVASTATIN CALCIUM 80 MG/1
80 TABLET, FILM COATED ORAL DAILY
Qty: 90 TABLET | Refills: 3 | Status: SHIPPED | OUTPATIENT
Start: 2025-05-27

## 2025-06-11 RX ORDER — ALBUTEROL SULFATE 90 UG/1
2 INHALANT RESPIRATORY (INHALATION) EVERY 4 HOURS PRN
Qty: 18 G | Refills: 11 | Status: SHIPPED | OUTPATIENT
Start: 2025-06-11

## 2025-06-11 NOTE — TELEPHONE ENCOUNTER
[facility]  3/20/25  [facility]  7/22/25    Most recent  labs 2/18/25 Potassium 3.6      ----- Message from Lillie sent at 6/11/2025  1:43 PM CDT -----  Patient needs a refill on prescription potassium chloride (KLOR-CON) 10 MEQ Thank you

## 2025-06-12 RX ORDER — POTASSIUM CHLORIDE 750 MG/1
10 TABLET, EXTENDED RELEASE ORAL 2 TIMES DAILY
Qty: 180 TABLET | Refills: 3 | Status: SHIPPED | OUTPATIENT
Start: 2025-06-12

## 2025-06-18 ENCOUNTER — HOSPITAL ENCOUNTER (OUTPATIENT)
Dept: RADIOLOGY | Facility: HOSPITAL | Age: 65
Discharge: HOME | End: 2025-06-18
Payer: MEDICAID

## 2025-06-18 DIAGNOSIS — Z87.891 FORMER SMOKER: Chronic | ICD-10-CM

## 2025-06-18 PROCEDURE — 71271 CT THORAX LUNG CANCER SCR C-: CPT | Mod: TC

## 2025-09-02 DIAGNOSIS — Z00.00 ANNUAL WELLNESS VISIT: ICD-10-CM

## 2025-09-02 DIAGNOSIS — J44.9 CHRONIC OBSTRUCTIVE PULMONARY DISEASE, UNSPECIFIED COPD TYPE: ICD-10-CM

## 2025-09-02 DIAGNOSIS — F51.01 PRIMARY INSOMNIA: Chronic | ICD-10-CM

## 2025-09-02 DIAGNOSIS — Z87.891 FORMER SMOKER: Chronic | ICD-10-CM

## 2025-09-02 RX ORDER — UMECLIDINIUM BROMIDE AND VILANTEROL TRIFENATATE 62.5; 25 UG/1; UG/1
1 POWDER RESPIRATORY (INHALATION) DAILY
Qty: 60 EACH | Refills: 2 | Status: CANCELLED | OUTPATIENT
Start: 2025-09-02

## 2025-09-02 RX ORDER — TIOTROPIUM BROMIDE AND OLODATEROL 3.124; 2.736 UG/1; UG/1
2 SPRAY, METERED RESPIRATORY (INHALATION) DAILY
Qty: 4 G | Refills: 2 | Status: SHIPPED | OUTPATIENT
Start: 2025-09-02

## 2025-09-02 RX ORDER — TRAZODONE HYDROCHLORIDE 50 MG/1
25 TABLET ORAL NIGHTLY
Qty: 30 TABLET | Refills: 1 | Status: SHIPPED | OUTPATIENT
Start: 2025-09-02 | End: 2025-12-31